# Patient Record
Sex: MALE | Race: WHITE | Employment: FULL TIME
[De-identification: names, ages, dates, MRNs, and addresses within clinical notes are randomized per-mention and may not be internally consistent; named-entity substitution may affect disease eponyms.]

---

## 2021-10-20 ENCOUNTER — NURSE TRIAGE (OUTPATIENT)
Dept: OTHER | Facility: CLINIC | Age: 46
End: 2021-10-20

## 2021-10-20 ENCOUNTER — TELEPHONE (OUTPATIENT)
Dept: ADMINISTRATIVE | Age: 46
End: 2021-10-20

## 2021-10-20 NOTE — TELEPHONE ENCOUNTER
Received call from Gabi Jasmine at [facility] with [employer]. Brief description of triage: elevated /97, 163/91 and 143/91. Caller experiencing SOB and chest pain with exertion, headaches, eye twitching, and other symptoms. He is currently not established with a PCP, but is being scheduled today. Triage indicates for patient to go to PCP office now. Caller sent to [facility] for initial treatment. Care advice provided, patient verbalizes understanding; denies any other questions or concerns; instructed to call back for any new or worsening symptoms. Writer provided warm transfer to Benigno Donovan at [facility] for appointment scheduling. Attention Provider: Thank you for allowing me to participate in the care of your patient. The patient was connected to triage in response to information provided to the ECC/PSC. Please do not respond through this encounter as the response is not directed to a shared pool. Reason for Disposition   BP Systolic BP >= 853 OR Diastolic >= 90 and postpartum (from 0 to 6 weeks after delivery)    Answer Assessment - Initial Assessment Questions  1. BLOOD PRESSURE: \"What is the blood pressure? \" \"Did you take at least two measurements 5 minutes apart? \"      Today his BP was 182/97 at 1030, then 163/91, and finally 149/91at 1120 today    2. ONSET: \"When did you take your blood pressure? \"      Today    3. HOW: \"How did you obtain the blood pressure? \" (e.g., visiting nurse, automatic home BP monitor)      Home auto cuff    4. HISTORY: \"Do you have a history of high blood pressure? \"      Yes, hypertension, but fired last PCP and has no meds. Now currently having symptoms    5. MEDICATIONS: [place] you taking any medications for blood pressure? \" \"Have you missed any doses recently? \"      He does not remember what he was taking    6. OTHER SYMPTOMS: \"Do you have any symptoms? \" (e.g., headache, chest pain, blurred vision, difficulty breathing, weakness)      Chest pain and SOB with exertion, eye twitching, headaches,neck pain. 7. PREGNANCY: \"Is there any chance you are pregnant? \" \"When was your last menstrual period? \"      N/a    Protocols used: HIGH BLOOD PRESSURE-ADULT-[place]

## 2021-10-20 NOTE — TELEPHONE ENCOUNTER
Called pt to advise of new PCP information per PCP office. Pt advised that Charl Apley, CNP with [facility], is accepting new pts. Pt will need to go to their office located at: [address], to  new patient forms. Forms must be completed prior to scheduling New patient appt. Advised pt that office hours are Monday through Friday 8 am to 5 pm, expect Thursday when they close at noon. Pt also advised that their lunch hour is 11:45 am to 12:45  pm and to avoid going to the office during that time. Pt verbalized understanding.

## 2021-11-08 ENCOUNTER — OFFICE VISIT (OUTPATIENT)
Dept: FAMILY MEDICINE CLINIC | Age: 46
End: 2021-11-08
Payer: COMMERCIAL

## 2021-11-08 VITALS
HEART RATE: 84 BPM | TEMPERATURE: 97.7 F | DIASTOLIC BLOOD PRESSURE: 88 MMHG | RESPIRATION RATE: 14 BRPM | BODY MASS INDEX: 36.16 KG/M2 | HEIGHT: 72 IN | OXYGEN SATURATION: 97 % | WEIGHT: 267 LBS | SYSTOLIC BLOOD PRESSURE: 130 MMHG

## 2021-11-08 DIAGNOSIS — K58.9 IRRITABLE BOWEL SYNDROME, UNSPECIFIED TYPE: ICD-10-CM

## 2021-11-08 DIAGNOSIS — E78.5 HYPERLIPIDEMIA, UNSPECIFIED HYPERLIPIDEMIA TYPE: ICD-10-CM

## 2021-11-08 DIAGNOSIS — F31.81 BIPOLAR 2 DISORDER (HCC): ICD-10-CM

## 2021-11-08 DIAGNOSIS — K21.9 GASTROESOPHAGEAL REFLUX DISEASE, UNSPECIFIED WHETHER ESOPHAGITIS PRESENT: ICD-10-CM

## 2021-11-08 DIAGNOSIS — R51.9 INTRACTABLE HEADACHE, UNSPECIFIED CHRONICITY PATTERN, UNSPECIFIED HEADACHE TYPE: ICD-10-CM

## 2021-11-08 DIAGNOSIS — Z12.5 PROSTATE CANCER SCREENING: ICD-10-CM

## 2021-11-08 DIAGNOSIS — Z12.11 COLON CANCER SCREENING: ICD-10-CM

## 2021-11-08 DIAGNOSIS — R07.9 CHEST PAIN, UNSPECIFIED TYPE: ICD-10-CM

## 2021-11-08 DIAGNOSIS — M19.90 ARTHRITIS: ICD-10-CM

## 2021-11-08 DIAGNOSIS — I10 HYPERTENSION, UNSPECIFIED TYPE: Primary | ICD-10-CM

## 2021-11-08 DIAGNOSIS — Z13.1 DIABETES MELLITUS SCREENING: ICD-10-CM

## 2021-11-08 PROCEDURE — 99204 OFFICE O/P NEW MOD 45 MIN: CPT | Performed by: NURSE PRACTITIONER

## 2021-11-08 RX ORDER — LAMOTRIGINE 200 MG/1
200 TABLET ORAL DAILY
COMMUNITY
Start: 2021-09-10 | End: 2022-10-04 | Stop reason: SDUPTHER

## 2021-11-08 RX ORDER — LISINOPRIL 10 MG/1
10 TABLET ORAL DAILY
Qty: 30 TABLET | Refills: 5 | Status: SHIPPED | OUTPATIENT
Start: 2021-11-08 | End: 2021-12-06 | Stop reason: ALTCHOICE

## 2021-11-08 SDOH — ECONOMIC STABILITY: FOOD INSECURITY: WITHIN THE PAST 12 MONTHS, THE FOOD YOU BOUGHT JUST DIDN'T LAST AND YOU DIDN'T HAVE MONEY TO GET MORE.: NEVER TRUE

## 2021-11-08 SDOH — ECONOMIC STABILITY: FOOD INSECURITY: WITHIN THE PAST 12 MONTHS, YOU WORRIED THAT YOUR FOOD WOULD RUN OUT BEFORE YOU GOT MONEY TO BUY MORE.: NEVER TRUE

## 2021-11-08 ASSESSMENT — ENCOUNTER SYMPTOMS: SHORTNESS OF BREATH: 1

## 2021-11-08 ASSESSMENT — PATIENT HEALTH QUESTIONNAIRE - PHQ9
SUM OF ALL RESPONSES TO PHQ QUESTIONS 1-9: 0
SUM OF ALL RESPONSES TO PHQ9 QUESTIONS 1 & 2: 0
2. FEELING DOWN, DEPRESSED OR HOPELESS: 0
1. LITTLE INTEREST OR PLEASURE IN DOING THINGS: 0
SUM OF ALL RESPONSES TO PHQ QUESTIONS 1-9: 0
SUM OF ALL RESPONSES TO PHQ QUESTIONS 1-9: 0

## 2021-11-08 ASSESSMENT — SOCIAL DETERMINANTS OF HEALTH (SDOH): HOW HARD IS IT FOR YOU TO PAY FOR THE VERY BASICS LIKE FOOD, HOUSING, MEDICAL CARE, AND HEATING?: NOT HARD AT ALL

## 2021-11-08 NOTE — PROGRESS NOTES
Name: Hood Gee  : 1975         Chief Complaint:     Chief Complaint   Patient presents with    [facility]     est care. would like labs.  Hypertension     usually has high bp. states \" 150-160\"     Hyperlipidemia     possible, would like labs.  Other     Dr. Gallito Fields for [facility] for bipolor. History of Present Illness:      Hood Gee is a 51 y.o.  male who presents with Establish Care (est care. would like labs. ), Hypertension (usually has high bp. states \" 150-160\" ), Hyperlipidemia (possible, would like labs. ), and Other (Dr. Gallito Fields for [facility] for bipolor. )      HPI     The patient presents to establish care. Bipolar 2:  Diagnosed 6 six years ago, per patient. He is following with Dr. Gallito Fields (psych) every 3 months. He is currently taking Lamictal 200mg QD and latuda 20mg QD. He states that he Saad Ng has some highs and lows\" but they are overall more controlled. Hyperlipidemia:  Admits a history of high cholesterol. He was previously on cholesterol medication but discontinued due to joint pain. He denies following a low cholesterol. Hypertension:  He admits being on a medication in the past, name unknown. He does not monitor blood pressure at home routinely. He admits chest pain and SOB. Chest pain and SOB began winter 2020. Chest pain occurs in left side of chest. Pain is described as \"tightening\". Chest pain and SOB are worse with activity. Admits headaches and eye twitching when his blood pressure is elevated. He checks his BP during these symptoms and they average as high as 180/110. He has a blood pressure cuff at home. Past Medical History:     No past medical history on file.    Reviewed all health maintenance requirements and ordered appropriate tests  Health Maintenance Due   Topic Date Due    Lipid screen  Never done    Diabetes screen  Never done    Colon cancer screen colonoscopy  Never done       Past Surgical History:     Past Surgical History: Procedure Laterality Date    HERNIA REPAIR          Medications:       Prior to Admission medications    Medication Sig Start Date End Date Taking? Authorizing Provider   lamoTRIgine (LAMICTAL) 200 MG tablet Take 200 mg by mouth 9/10/21  Yes Historical Provider, MD   lurasidone (LATUDA) 20 MG TABS tablet Take 20 mg by mouth Daily with supper 9/10/21  Yes Historical Provider, MD   lisinopril (PRINIVIL;ZESTRIL) 10 MG tablet Take 1 tablet by mouth daily 11/8/21  Yes DAVID Corrigan CNP        Allergies:       Patient has no known allergies. Social History:     Tobacco:    reports that he has quit smoking. He has never used smokeless tobacco.  Alcohol:      reports current alcohol use. Drug Use:  reports previous drug use. Drug: Marijuana Angely Talon). Family History:     No family history on file. Review of Systems:     Positive and Negative as described in HPI    Review of Systems   Respiratory: Positive for shortness of breath. Cardiovascular: Positive for chest pain. Neurological: Positive for headaches. Physical Exam:   Vitals:  /88   Pulse 84   Temp 97.7 °F (36.5 °C)   Resp 14   Ht 6' (1.829 m)   Wt 267 lb (121.1 kg)   SpO2 97%   BMI 36.21 kg/m²     Physical Exam  Constitutional:       General: He is not in acute distress. Appearance: Normal appearance. He is obese. He is not ill-appearing or toxic-appearing. HENT:      Head: Normocephalic. Right Ear: Tympanic membrane, ear canal and external ear normal. There is no impacted cerumen. Left Ear: Tympanic membrane, ear canal and external ear normal. There is no impacted cerumen. Nose: Nose normal. No congestion or rhinorrhea. Mouth/Throat:      Mouth: Mucous membranes are moist.      Pharynx: No oropharyngeal exudate or posterior oropharyngeal erythema. Eyes:      Conjunctiva/sclera: Conjunctivae normal.   Cardiovascular:      Rate and Rhythm: Normal rate and regular rhythm.       Heart sounds: Normal heart sounds. No murmur heard. Pulmonary:      Effort: Pulmonary effort is normal. No respiratory distress. Breath sounds: Normal breath sounds. No stridor. No wheezing, rhonchi or rales. Abdominal:      General: Bowel sounds are normal. There is no distension. Palpations: Abdomen is soft. There is no mass. Tenderness: There is no abdominal tenderness. There is no guarding. Hernia: No hernia is present. Comments: Abdomen round   Musculoskeletal:      Cervical back: Neck supple. Lymphadenopathy:      Cervical: No cervical adenopathy. Skin:     General: Skin is warm. Neurological:      Mental Status: He is alert and oriented to person, place, and time. Psychiatric:         Mood and Affect: Mood normal.         Behavior: Behavior normal.         Thought Content: Thought content normal.         Judgment: Judgment normal.       Data:     No results found for: NA, K, CL, CO2, BUN, CREATININE, GLUCOSE, PROT, LABALBU, BILITOT, ALKPHOS, AST, ALT  No results found for: WBC, RBC, HGB, HCT, MCV, MCH, MCHC, RDW, PLT, MPV  No results found for: TSH  No results found for: CHOL, HDL, PSA, LABA1C    Assessment/Plan:      Diagnosis Orders   1. Hypertension, unspecified type  ALT    AST    Basic Metabolic Panel    CBC   2. Colon cancer screening  POCT Fecal Immunochemical Test (FIT)   3. Diabetes mellitus screening  Hemoglobin A1C   4. Hyperlipidemia, unspecified hyperlipidemia type  Lipid Panel   5. Prostate cancer screening  PSA screening   6. Intractable headache, unspecified chronicity pattern, unspecified headache type  TSH With Reflex Ft4   7. Chest pain, unspecified type  EKG 12 lead    CARDIAC STRESS TEST EXERCISE ONLY   8. Gastroesophageal reflux disease, unspecified whether esophagitis present     9. Arthritis     10. Irritable bowel syndrome, unspecified type     11.  [facility] ([facility]       Bipolar 2:   -Continue following with psychiatry every 3 months  -No changes in medications at this time    Hyperlipidemia:  -Complete lipid panel today  -Will likely consider adding statin medication therapy back to regimen. He has a history of myalgias with statin therapy, will trial new agent.  -Request records from previous PCP    Hypertension:  --Stable in office today  -Initiate lisinopril 10 mg QD. Reviewed side effects.  -Continue monitoring blood pressure at home once daily  -Given symptoms of shortness of breath and chest pain, will further evaluate with EKG and stress test.  Will call with results and update plan as appropriate.  -Follow-up in office 4 weeks or sooner if symptoms worsen or persist    GI:  -Patient reports a history of abdominal pain. Colonoscopy completed 6/2002 showed \"colonic mucosa appeared entirely normal.  There were no bleeding sites found. There were no masses or polyps. There were no vascular abnormalities noted\". -We will discuss further at upcoming appointment. He will likely be due for repeat colonoscopy.   -Follow-up in office in 4 weeks or sooner if symptoms worsen or persist    Completed Refills   Requested Prescriptions     Signed Prescriptions Disp Refills    lisinopril (PRINIVIL;ZESTRIL) 10 MG tablet 30 tablet 5     Sig: Take 1 tablet by mouth daily       Orders Placed This Encounter   Procedures    ALT     Standing Status:   Future     Standing Expiration Date:   11/8/2022    AST     Standing Status:   Future     Standing Expiration Date:   11/8/2022    Basic Metabolic Panel     Standing Status:   Future     Standing Expiration Date:   11/8/2022    CBC     Standing Status:   Future     Standing Expiration Date:   11/8/2022    Hemoglobin A1C     Standing Status:   Future     Standing Expiration Date:   11/8/2022    Lipid Panel     Standing Status:   Future     Standing Expiration Date:   11/8/2022     Order Specific Question:   Is Patient Fasting?/# of Hours     Answer:   fasting    PSA screening     Standing Status:   Future Standing Expiration Date:   11/8/2022    TSH With Reflex Ft4     Standing Status:   Future     Standing Expiration Date:   11/8/2022    CARDIAC STRESS TEST EXERCISE ONLY     Standing Status:   Future     Standing Expiration Date:   1/7/2022     Order Specific Question:   Reason for Exam?     Answer:   Chest pain    POCT Fecal Immunochemical Test (FIT)     Standing Status:   Future     Standing Expiration Date:   11/8/2022    EKG 12 lead     Standing Status:   Future     Standing Expiration Date:   1/7/2022     Order Specific Question:   Reason for Exam?     Answer:   Chest pain        No results found for this visit on 11/08/21. Return in about 4 weeks (around 12/6/2021), or if symptoms worsen or fail to improve, for GI & HTN f/u (extended).     Electronically signed by DAVID Breen CNP on 11/08/21 at 11:57 AM.

## 2021-11-09 ENCOUNTER — HOSPITAL ENCOUNTER (OUTPATIENT)
Age: 46
Discharge: HOME | End: 2021-11-09
Payer: COMMERCIAL

## 2021-11-09 DIAGNOSIS — Z13.1 DIABETES MELLITUS SCREENING: ICD-10-CM

## 2021-11-09 DIAGNOSIS — R51.9 INTRACTABLE HEADACHE, UNSPECIFIED CHRONICITY PATTERN, UNSPECIFIED HEADACHE TYPE: ICD-10-CM

## 2021-11-09 DIAGNOSIS — E78.5 HYPERLIPIDEMIA, UNSPECIFIED HYPERLIPIDEMIA TYPE: ICD-10-CM

## 2021-11-09 DIAGNOSIS — Z12.5 PROSTATE CANCER SCREENING: ICD-10-CM

## 2021-11-09 DIAGNOSIS — I10 HYPERTENSION, UNSPECIFIED TYPE: ICD-10-CM

## 2021-11-09 LAB
ALT SERPL-CCNC: 38 U/L (ref 5–41)
ANION GAP SERPL CALCULATED.3IONS-SCNC: 11 MMOL/L (ref 9–17)
AST SERPL-CCNC: 22 U/L
BUN BLDV-MCNC: 10 MG/DL (ref 6–20)
BUN/CREAT BLD: 14 (ref 9–20)
CALCIUM SERPL-MCNC: 8.8 MG/DL (ref 8.6–10.4)
CHLORIDE BLD-SCNC: 104 MMOL/L (ref 98–107)
CO2: 23 MMOL/L (ref 20–31)
CREAT SERPL-MCNC: 0.72 MG/DL (ref 0.7–1.2)
ESTIMATED AVERAGE GLUCOSE: 126 MG/DL
GFR AFRICAN AMERICAN: >60 ML/MIN
GFR NON-AFRICAN AMERICAN: >60 ML/MIN
GFR SERPL CREATININE-BSD FRML MDRD: ABNORMAL ML/MIN/{1.73_M2}
GFR SERPL CREATININE-BSD FRML MDRD: ABNORMAL ML/MIN/{1.73_M2}
GLUCOSE BLD-MCNC: 125 MG/DL (ref 70–99)
HBA1C MFR BLD: 6 % (ref 4–6)
HCT VFR BLD CALC: 44.6 % (ref 40.7–50.3)
HEMOGLOBIN: 15.4 G/DL (ref 13–17)
MCH RBC QN AUTO: 30.4 PG (ref 25.2–33.5)
MCHC RBC AUTO-ENTMCNC: 34.5 G/DL (ref 28.4–34.8)
MCV RBC AUTO: 88 FL (ref 82.6–102.9)
NRBC AUTOMATED: 0 PER 100 WBC
PDW BLD-RTO: 12.2 % (ref 11.8–14.4)
PLATELET # BLD: NORMAL K/UL (ref 138–453)
PLATELET, FLUORESCENCE: 142 K/UL (ref 138–453)
PLATELET, IMMATURE FRACTION: 5.6 % (ref 1.1–10.3)
PMV BLD AUTO: NORMAL FL (ref 8.1–13.5)
POTASSIUM SERPL-SCNC: 4.5 MMOL/L (ref 3.7–5.3)
RBC # BLD: 5.07 M/UL (ref 4.21–5.77)
SODIUM BLD-SCNC: 138 MMOL/L (ref 135–144)
TSH SERPL DL<=0.05 MIU/L-ACNC: 3.22 MIU/L (ref 0.3–5)
WBC # BLD: 7.8 K/UL (ref 3.5–11.3)

## 2021-11-09 PROCEDURE — 80061 LIPID PANEL: CPT

## 2021-11-09 PROCEDURE — 85055 RETICULATED PLATELET ASSAY: CPT

## 2021-11-09 PROCEDURE — G0103 PSA SCREENING: HCPCS

## 2021-11-09 PROCEDURE — 80048 BASIC METABOLIC PNL TOTAL CA: CPT

## 2021-11-09 PROCEDURE — 84443 ASSAY THYROID STIM HORMONE: CPT

## 2021-11-09 PROCEDURE — 85027 COMPLETE CBC AUTOMATED: CPT

## 2021-11-09 PROCEDURE — 84460 ALANINE AMINO (ALT) (SGPT): CPT

## 2021-11-09 PROCEDURE — 84450 TRANSFERASE (AST) (SGOT): CPT

## 2021-11-09 PROCEDURE — 83036 HEMOGLOBIN GLYCOSYLATED A1C: CPT

## 2021-11-10 DIAGNOSIS — R73.03 PREDIABETES: ICD-10-CM

## 2021-11-10 LAB
CHOLESTEROL/HDL RATIO: 6.3
CHOLESTEROL: 259 MG/DL
HDLC SERPL-MCNC: 41 MG/DL
LDL CHOLESTEROL: 167 MG/DL (ref 0–130)
PROSTATE SPECIFIC ANTIGEN: 0.33 UG/L
TRIGL SERPL-MCNC: 257 MG/DL
VLDLC SERPL CALC-MCNC: ABNORMAL MG/DL (ref 1–30)

## 2021-11-10 RX ORDER — PRAVASTATIN SODIUM 20 MG
20 TABLET ORAL DAILY
Qty: 30 TABLET | Refills: 3 | Status: SHIPPED | OUTPATIENT
Start: 2021-11-10 | End: 2022-03-30 | Stop reason: SDUPTHER

## 2021-11-22 ENCOUNTER — HOSPITAL ENCOUNTER (OUTPATIENT)
Dept: NON INVASIVE DIAGNOSTICS | Age: 46
Discharge: HOME | End: 2021-11-22
Payer: COMMERCIAL

## 2021-11-22 DIAGNOSIS — R07.9 CHEST PAIN, UNSPECIFIED TYPE: ICD-10-CM

## 2021-11-22 PROCEDURE — 93017 CV STRESS TEST TRACING ONLY: CPT

## 2021-11-23 DIAGNOSIS — R07.9 CHEST PAIN, UNSPECIFIED TYPE: Primary | ICD-10-CM

## 2021-11-23 NOTE — PROCEDURES
[address] [facility], [address] [facility]                              CARDIAC STRESS TEST    PATIENT NAME: Fredy Branahm                 :        1975  MED REC NO:   [id number]                              ROOM:  ACCOUNT NO:   [de-identified]                           ADMIT DATE: 2021  PROVIDER:     Hayley Cosby    CARDIOVASCULAR DIAGNOSTIC DEPARTMENT    DATE OF STUDY:  2021    ORDERING PROVIDER:  Primo Driscoll CNP    PRIMARY CARE PROVIDER:  Primo Driscoll CNP    INTERPRETING PHYSICIAN:  Anayeli Disla. Castro Rocha MD    EXERCISE STRESS TEST REPORT    Stress, exercise stress. INDICATIONS:  Assessment of recent chest pain and/or chest discomfort. CLINICAL HISTORY:  The patient is a 50-year-old man with no known  coronary artery disease. Previous cardiac history includes:  None. Other previous history includes:  Chest pain, fatigue, dyspnea,  lightheadedness, hypertension, family history of coronary artery disease  in mother and father both in their late 42's. Symptoms just prior to testing include:  None. Relevant medications:  None. PROCEDURE:  The patient performed treadmill exercise using a Akash  protocol, completing 7:10 minutes and completing an estimated workload  of 7.46 metabolic equivalents (METS). The test was terminated due to fatigue and shortness of breath. The heart rate was 80 beats per minute at baseline and increased to 153  beats at peak exercise, which was 88% of the maximum predicted heart  rate. The rest blood pressure was 162/90 mm/Hg and increased to 212/63  mm/Hg, which is a hypertensive response. During the procedure, the  patient developed fatigue, shortness of breath, leg fatigue and chest  pain (1/3). STRESS ECG RESULTS:  The resting electrocardiogram demonstrated normal  sinus rhythm without definitive ST-segment abnormalities suggestive of  myocardial ischemia.     At peak exercise and during recovery, the patient developed:    Up-sloping ST segment changes in leads II, III, AVF, V3, V4, V5, which  did not meet diagnostic criteria for myocardial ischemia with no  premature atrial contractions (PACs) and no premature ventricular  contractions (PVCs). IMPRESSION:  No significant electrocardiographic evidence of myocardial ischemia  during EKG monitoring without significant associated arrhythmias. The patient's Duke Treadmill score is 2 which correlates with an  intermediate risk for significant coronary artery disease. Based on the patient's abnormal exercise stress test results, a repeat  study with the addition of cardiac imaging is recommended.           Raad Berman    D: 11/23/2021 7:48:26       T: 11/23/2021 7:49:49     [email or web address]  Job#: [id number]     Doc#: Unknown    CC:  DAVID Flores

## 2021-12-06 ENCOUNTER — OFFICE VISIT (OUTPATIENT)
Dept: FAMILY MEDICINE CLINIC | Age: 46
End: 2021-12-06
Payer: COMMERCIAL

## 2021-12-06 VITALS
DIASTOLIC BLOOD PRESSURE: 98 MMHG | OXYGEN SATURATION: 98 % | HEART RATE: 91 BPM | RESPIRATION RATE: 14 BRPM | SYSTOLIC BLOOD PRESSURE: 134 MMHG | HEIGHT: 72 IN | TEMPERATURE: 97 F | BODY MASS INDEX: 36.16 KG/M2 | WEIGHT: 267 LBS

## 2021-12-06 DIAGNOSIS — I10 HYPERTENSION, UNSPECIFIED TYPE: ICD-10-CM

## 2021-12-06 DIAGNOSIS — R19.7 DIARRHEA, UNSPECIFIED TYPE: Primary | ICD-10-CM

## 2021-12-06 DIAGNOSIS — R15.2 FECAL URGENCY: ICD-10-CM

## 2021-12-06 DIAGNOSIS — Z80.0 FAMILY HISTORY OF COLON CANCER: ICD-10-CM

## 2021-12-06 PROCEDURE — 99214 OFFICE O/P EST MOD 30 MIN: CPT | Performed by: NURSE PRACTITIONER

## 2021-12-06 RX ORDER — AMLODIPINE BESYLATE 2.5 MG/1
2.5 TABLET ORAL DAILY
Qty: 30 TABLET | Refills: 5 | Status: SHIPPED | OUTPATIENT
Start: 2021-12-06 | End: 2022-01-17 | Stop reason: ALTCHOICE

## 2021-12-06 RX ORDER — LISINOPRIL 20 MG/1
20 TABLET ORAL DAILY
Qty: 30 TABLET | Refills: 3 | Status: SHIPPED | OUTPATIENT
Start: 2021-12-06 | End: 2022-04-26 | Stop reason: SDUPTHER

## 2021-12-06 ASSESSMENT — ENCOUNTER SYMPTOMS
BLOOD IN STOOL: 1
DIARRHEA: 1
SHORTNESS OF BREATH: 0

## 2021-12-06 NOTE — PATIENT INSTRUCTIONS
Increase lisinopril from 10mg to 20mg once daily     SURVEY:    You may be receiving a survey from [facility] regarding your visit today. Please complete the survey to enable us to provide the highest quality of care to you and your family. If you cannot score us a very good on any question, please call the office to discuss how we could of made your experience a very good one. Thank you.

## 2021-12-06 NOTE — PROGRESS NOTES
Name: Yadi Fuller  : 1975         Chief Complaint:     Chief Complaint   Patient presents with    Hypertension     patient states bp is still running high. 134/100 in office today    Follow-up     States he doesnt have a regular bm. constantly worried if he is close to a restroom. History of Present Illness:      Yadi Fuller is a 51 y.o.  male who presents with Hypertension (patient states bp is still running high. 134/100 in office today) and Follow-up (States he doesnt have a regular bm. constantly worried if he is close to a restroom. )      HPI     Hypertension:  Current medication regimen includes lisinopril 10mg. He is monitoring his blood pressure at home. At-home blood pressure is averaging 150s/?. He admits it checking it when he has a headache and it was 180/110. He is not following a low-sodium diet. Admits having a poor diet. He denies chest pain, shortness of breath, vision changes (\"left eye twitching\"), and palpitations. Admits lightheadedness, dizziness, and headache if his blood pressure is elevated. GI:  Admits diarrhea and inconsistent bowel movements. Admits bowel urgency. Denies bowel incontinence. Symptoms have been ongoing \"for forever\". He admits taking a probiotic, \"yeast pill\", and changing his diet in the past which helped his symptoms. He admits food allergies of eggs, yeast, and wheat. He admits eating these foods. Colonoscopy completed 2002 showed \"colonic mucosa appeared entirely normal.  There were no bleeding sites found. There were no masses or polyps. There were no vascular abnormalities noted\". Denies blood in the stool. He is having episodes 1-5 times daily. Stools are described as \"water\". Admits grandfather with colon cancer in late [de-identified]. Denies family history of IBD. Past Medical History:     No past medical history on file.    Reviewed all health maintenance requirements and ordered appropriate tests  Health Maintenance Due   Topic Date Due    Colon cancer screen colonoscopy  Never done       Past Surgical History:     Past Surgical History:   Procedure Laterality Date    HERNIA REPAIR          Medications:       Prior to Admission medications    Medication Sig Start Date End Date Taking? Authorizing Provider   lisinopril (PRINIVIL;ZESTRIL) 20 MG tablet Take 1 tablet by mouth daily 12/6/21  Yes DAVID Mccurdy CNP   amLODIPine (NORVASC) 2.5 MG tablet Take 1 tablet by mouth daily 12/6/21  Yes DAVID Mccurdy CNP   pravastatin (PRAVACHOL) 20 MG tablet Take 1 tablet by mouth daily 11/10/21  Yes DAVID Mccurdy CNP   lamoTRIgine (LAMICTAL) 200 MG tablet Take 200 mg by mouth 9/10/21  Yes Historical Provider, MD   lurasidone (LATUDA) 20 MG TABS tablet Take 20 mg by mouth Daily with supper 9/10/21  Yes Historical Provider, MD        Allergies:       Patient has no known allergies. Social History:     Tobacco:    reports that he has quit smoking. He has never used smokeless tobacco.  Alcohol:      reports current alcohol use. Drug Use:  reports previous drug use. Drug: Marijuana Roscoe Jose). Family History:     No family history on file. Review of Systems:     Positive and Negative as described in HPI    Review of Systems   Eyes: Positive for visual disturbance. Respiratory: Negative for shortness of breath. Cardiovascular: Negative for chest pain and palpitations. Gastrointestinal: Positive for blood in stool and diarrhea. Neurological: Positive for dizziness, light-headedness and headaches. Physical Exam:   Vitals:  BP (!) 134/98   Pulse 91   Temp 97 °F (36.1 °C)   Resp 14   Ht 6' (1.829 m)   Wt 267 lb (121.1 kg)   SpO2 98%   BMI 36.21 kg/m²     Physical Exam  Constitutional:       General: He is not in acute distress. Appearance: Normal appearance. He is obese. He is not ill-appearing or toxic-appearing. HENT:      Head: Normocephalic.    Cardiovascular:      Rate and Rhythm: Normal rate and regular rhythm. Heart sounds: Normal heart sounds. No murmur heard. Pulmonary:      Effort: Pulmonary effort is normal. No respiratory distress. Breath sounds: Normal breath sounds. No stridor. No wheezing, rhonchi or rales. Abdominal:      General: Bowel sounds are normal. There is no distension. Palpations: Abdomen is soft. There is no mass. Tenderness: There is no abdominal tenderness. There is no guarding. Hernia: No hernia is present. Neurological:      Mental Status: He is alert and oriented to person, place, and time. Psychiatric:         Mood and Affect: Mood normal.         Behavior: Behavior normal.         Thought Content: Thought content normal.         Judgment: Judgment normal.         Data:     Lab Results   Component Value Date     11/09/2021    K 4.5 11/09/2021     11/09/2021    CO2 23 11/09/2021    BUN 10 11/09/2021    CREATININE 0.72 11/09/2021    GLUCOSE 125 11/09/2021    AST 22 11/09/2021    ALT 38 11/09/2021     Lab Results   Component Value Date    WBC 7.8 11/09/2021    RBC 5.07 11/09/2021    HGB 15.4 11/09/2021    HCT 44.6 11/09/2021    MCV 88.0 11/09/2021    MCH 30.4 11/09/2021    MCHC 34.5 11/09/2021    RDW 12.2 11/09/2021    PLT See Reflexed IPF Result 11/09/2021    MPV NOT REPORTED 11/09/2021     Lab Results   Component Value Date    TSH 3.22 11/09/2021     Lab Results   Component Value Date    CHOL 259 11/09/2021    HDL 41 11/09/2021    PSA 0.33 11/09/2021    LABA1C 6.0 11/09/2021       Assessment/Plan:      Diagnosis Orders   1. Diarrhea, unspecified type  [facility], [place]   2. Fecal urgency  [facility], [place]   3. Family history of colon cancer  [facility], [facility], [place]   4.  Hypertension, unspecified type         Diarrhea:   -Referral to [address] [place] [address] placed today for colonoscopy  -He admits several dietary allergies, though does not avoid these foods. I encouraged him to eliminate these from his diet. Continue probiotics daily. Hypertension:  -Unstable  -Increase lisinopril from 10 mg to 20 mg  -Add amlodipine 2.5 mg QD  -Reviewed lifestyle modifications to assist with lowering blood pressure including weight loss, healthy diet, exercising routinely, low-sodium diet, limiting caffeine and alcohol, and encouraging stress relief techniques. -Monitor blood pressure once daily  -Discussed that if his blood pressure rises above 180/110 he is to present to the emergency department. If his blood pressure is elevated with associated symptoms of chest pain, shortness of breath, palpitations, headache, dizziness, lightheadedness, or palpitations he is to present to the emergency department  -Follow-up 6 weeks for hypertension follow-up    Chest Pain:   --Complete stress test    Completed Refills   Requested Prescriptions     Signed Prescriptions Disp Refills    lisinopril (PRINIVIL;ZESTRIL) 20 MG tablet 30 tablet 3     Sig: Take 1 tablet by mouth daily    amLODIPine (NORVASC) 2.5 MG tablet 30 tablet 5     Sig: Take 1 tablet by mouth daily       Orders Placed This Encounter   Procedures   Alta Jean DO, General Surgery, [place]     Referral Priority:   Routine     Referral Type:   Eval and Treat     Referral Reason:   Specialty Services Required     Referred to Provider:   Camryn Trevizo DO     Requested Specialty:   General Surgery     Number of Visits Requested:   1        No results found for this visit on 12/06/21. Return in about 6 weeks (around 1/17/2022), or if symptoms worsen or fail to improve, for HTN f/u.     Electronically signed by DAVID Melendrez CNP on 12/06/21 at 12:49 PM.

## 2022-01-17 ENCOUNTER — OFFICE VISIT (OUTPATIENT)
Dept: FAMILY MEDICINE CLINIC | Age: 47
End: 2022-01-17
Payer: COMMERCIAL

## 2022-01-17 VITALS
BODY MASS INDEX: 36.98 KG/M2 | HEART RATE: 81 BPM | TEMPERATURE: 98 F | SYSTOLIC BLOOD PRESSURE: 138 MMHG | OXYGEN SATURATION: 97 % | DIASTOLIC BLOOD PRESSURE: 82 MMHG | HEIGHT: 72 IN | WEIGHT: 273 LBS

## 2022-01-17 DIAGNOSIS — R19.7 DIARRHEA, UNSPECIFIED TYPE: ICD-10-CM

## 2022-01-17 DIAGNOSIS — I10 HYPERTENSION, UNSPECIFIED TYPE: Primary | ICD-10-CM

## 2022-01-17 PROCEDURE — 99214 OFFICE O/P EST MOD 30 MIN: CPT | Performed by: NURSE PRACTITIONER

## 2022-01-17 RX ORDER — AMLODIPINE BESYLATE 5 MG/1
5 TABLET ORAL DAILY
Qty: 30 TABLET | Refills: 5 | Status: SHIPPED | OUTPATIENT
Start: 2022-01-17 | End: 2022-08-04

## 2022-01-17 ASSESSMENT — ENCOUNTER SYMPTOMS: SHORTNESS OF BREATH: 0

## 2022-01-17 NOTE — PATIENT INSTRUCTIONS
Continue lisinopril 20mg once daily     Increase amlodipine from 2.5mg once daily to 5mg once daily     Continue to monitor blood pressure at home. Write down the date, top number, bottom, and bottom number. Bring blood pressure cuff into next visit       SURVEY:    You may be receiving a survey from [employer] regarding your visit today. Please complete the survey to enable us to provide the highest quality of care to you and your family. If you cannot score us a very good on any question, please call the office to discuss how we could have made your experience a very good one. Thank you. Patient Education        Low Sodium Diet (2,000 Milligram): Care Instructions  Overview     Limiting sodium can be an important part of managing some health problems. The most common source of sodium is salt. People get most of the salt in their diet from canned, prepared, and packaged foods. Fast food and restaurant meals also are very high in sodium. Your doctor will probably limit your sodium to less than 2,000 milligrams (mg) a day. This limit counts all the sodium in prepared and packaged foods and any salt you add to your food. Follow-up care is a key part of your treatment and safety. Be sure to make and go to all appointments, and call your doctor if you are having problems. It's also a good idea to know your test results and keep a list of the medicines you take. How can you care for yourself at home? Read food labels  · Read labels on cans and food packages. The labels tell you how much sodium is in each serving. Make sure that you look at the serving size. If you eat more than the serving size, you have eaten more sodium. · Food labels also tell you the Percent Daily Value for sodium. Choose products with low Percent Daily Values for sodium. · Be aware that sodium can come in forms other than salt, including monosodium glutamate (MSG), sodium citrate, and sodium bicarbonate (baking soda).  MSG is often added to Asian food. When you eat out, you can sometimes ask for food without MSG or added salt. Buy low-sodium foods  · Buy foods that are labeled \"unsalted\" (no salt added), \"sodium-free\" (less than 5 mg of sodium per serving), or \"low-sodium\" (140 mg or less of sodium per serving). Foods labeled \"reduced-sodium\" and \"light sodium\" may still have too much sodium. Be sure to read the label to see how much sodium you are getting. · Buy fresh vegetables, or frozen vegetables without added sauces. Buy low-sodium versions of canned vegetables, soups, and other canned goods. Prepare low-sodium meals  · Cut back on the amount of salt you use in cooking. This will help you adjust to the taste. Do not add salt after cooking. One teaspoon of salt has about 2,300 mg of sodium. · Take the salt shaker off the table. · Flavor your food with garlic, lemon juice, onion, vinegar, herbs, and spices. Do not use soy sauce, lite soy sauce, steak sauce, onion salt, garlic salt, celery salt, or ketchup on your food. · Use low-sodium salad dressings, sauces, and ketchup. Or make your own salad dressings and sauces without adding salt. · Use less salt (or none) when recipes call for it. You can often use half the salt a recipe calls for without losing flavor. Other foods such as rice, pasta, and grains do not need added salt. · Rinse canned vegetables, and cook them in fresh water. This removes some--but not all--of the salt. · Avoid water that is naturally high in sodium or that has been treated with water softeners, which add sodium. If you buy bottled water, read the label and choose a sodium-free brand. Avoid high-sodium foods  · Avoid eating:  ? Smoked, cured, salted, and canned meat, fish, and poultry. ? Ham, trin[place], hot dogs, and luncheon meats. ? Regular, hard, and processed cheese and regular peanut butter.   ? Crackers with salted tops, and other salted snack foods such as pretzels, chips, and salted popcorn. ? Frozen prepared meals, unless labeled low-sodium. ? Canned and dried soups, broths, and bouillon, unless labeled sodium-free or low-sodium. ? Canned vegetables, unless labeled sodium-free or low-sodium. ? Western Sandra fries, pizza, tacos, and other fast foods. ? Pickles, olives, ketchup, and other condiments, especially soy sauce, unless labeled sodium-free or low-sodium. Where can you learn more? Go to https://PivotpeiDubbaeb.Beibamboo. org and sign in to your [employer] account. Enter J672 in the [facility] box to learn more about \"Low Sodium Diet (2,000 Milligram): Care Instructions. \"     If you do not have an account, please click on the \"Sign Up Now\" link. Current as of: September 8, 2021               Content Version: 13.1  © 4922-2257 [employer]. Care instructions adapted under license by [facility] [place] ([facility]). If you have questions about a medical condition or this instruction, always ask your healthcare professional. [place] [address] any warranty or liability for your use of this information. Patient Education        Learning About Low-Sodium Foods  What foods are low in sodium? The foods you eat contain nutrients, such as vitamins and minerals. Sodium is a nutrient. Your body needs the right amount to stay healthy and work as it should. You can use the list below to help you make choices about which foods to eat.   Fruits  · Fresh, frozen, canned, or dried fruit  Vegetables  · Fresh or frozen vegetables, with no added salt  · Canned vegetables, low-sodium or with no added salt  Grains  · Bagels without salted tops  · Cereal with no added salt  · Corn tortillas  · Crackers with no added salt  · Oatmeal, cooked without salt  · Popcorn with no salt  · Pasta and noodles, cooked without salt  · Rice, cooked without salt  · Unsalted pretzels  Dairy and dairy alternatives  · Butter, unsalted  · Cream cheese  · Ice cream  · Milk  · Soy milk  Meats and other protein foods  · Beans and peas, canned with no salt  · Eggs  · Fresh fish (not smoked)  · Fresh meats (not smoked or cured)  · Nuts and nut butter, prepared without salt  · Poultry, not packaged with sodium solution  · Tofu, unseasoned  · Tuna, canned without salt  Seasonings  · Garlic  · Herbs and spices  · Lemon juice  · Mustard  · Olive oil  · Salt-free seasoning mixes  · Vinegar  Work with your doctor to find out how much of this nutrient you need. Depending on your health, you may need more or less of it in your diet. Where can you learn more? Go to https://WeMontagepepiceweb.Tembo [email or web address] org and sign in to your [employer] account. Enter F803 in the [employer] box to learn more about \"Learning About Low-Sodium Foods. \"     If you do not have an account, please click on the \"Sign Up Now\" link. Current as of: September 8, 2021               Content Version: 13.1  © 2006-2021 [employer]. Care instructions adapted under license by [facility] [place] ([facility]). If you have questions about a medical condition or this instruction, always ask your healthcare professional. [place] [address] any warranty or liability for your use of this information. Patient Education        How to Read a Food Label to Limit Sodium: Care Instructions  Overview  Limiting sodium can be an important part of managing some health problems. Processed foods, fast food, and restaurant foods are the major sources of dietary sodium. The most common name for sodium is salt. Most packaged foods have a Nutrition Facts label. This will tell you how much sodium is in one serving of food. Follow-up care is a key part of your treatment and safety. Be sure to make and go to all appointments, and call your doctor if you are having problems. It's also a good idea to know your test results and keep a list of the medicines you take. How can you care for yourself at home?   Read ingredient lists on food labels  · Read the list of ingredients on food labels to help you find how much sodium is in a food. The label lists the ingredients in a food in descending order (from the most to the least). If salt or sodium is high on the list, there may be a lot of sodium in the food. · Know that sodium has different names. Sodium is also called monosodium glutamate (MSG), sodium citrate, sodium alginate, and sodium phosphate. Read Nutrition Facts labels  · On most foods, there is a Nutrition Facts label. This will tell you how much sodium is in one serving of food. Look at both the serving size and the sodium amount. The serving size is located at the top of the label, usually right under the \"Nutrition Facts\" title. The amount of sodium is given in the list under the title. It is given in milligrams (mg). ? Check the serving size carefully. A single serving is often very small, and you may eat more than one serving. If this is the case, you will eat more sodium than listed on the label. For example, if the serving size for a canned soup is 1 cup and the sodium amount is 470 mg, if you have 2 cups you will eat 940 mg of sodium. · The nutrition facts for fresh fruits and vegetables are not listed on the food. They may be listed somewhere in the store. These foods usually have no sodium or low sodium. · The Nutrition Facts label also gives you the Percent Daily Value for sodium. This is how much of the recommended amount of sodium a serving contains. The daily value for sodium is 2,300 mg. So if the Percent Daily Value says 50%, this means one serving is giving you half of this, or 1,150 mg. Buy low-sodium foods  · Look for foods that are made with less sodium. Watch for the following words on the label. ? \"Unsalted\" means there is no sodium added to the food. But there may be sodium already in the food naturally. ? \"Sodium-free\" means a serving has less than 5 mg of sodium. ?  \"Very low sodium\" means a serving has 35 mg or less of sodium. ? \"Low-sodium\" means a serving has 140 mg or less of sodium. · \"Reduced-sodium\" means that there is 25% less sodium than what the food normally has. This is still usually too much sodium. · Buy fresh vegetables, or frozen vegetables without added sauces. Buy low-sodium versions of canned vegetables, soups, and other canned goods. Where can you learn more? Go to https://OndorepeBactest.[email or web address] org and sign in to your [employer] account. Enter 26 [id number] in the [facility] box to learn more about \"How to Read a Food Label to Limit Sodium: Care Instructions. \"     If you do not have an account, please click on the \"Sign Up Now\" link. Current as of: September 8, 2021               Content Version: 13.1  © 2675-9769 [employer]. Care instructions adapted under license by [facility] ([facility]). If you have questions about a medical condition or this instruction, always ask your healthcare professional. [address] any warranty or liability for your use of this information.

## 2022-01-17 NOTE — PROGRESS NOTES
Name: Jennifer Cloud  : 1975         Chief Complaint:     Chief Complaint   Patient presents with    Follow-up    Hypertension     , lisinopril was increased from 10 to 20 mg qd and amlodipine 2.5 mg added. he has been monitoring BP at home 145-155/95. does not monitor dietary sodium. Thinks readings are heading in the right direction. History of Present Illness:      Jennifer Cloud is a 51 y.o.  male who presents with Follow-up and Hypertension (, lisinopril was increased from 10 to 20 mg qd and amlodipine 2.5 mg added. he has been monitoring BP at home 145-155/95. does not monitor dietary sodium. Thinks readings are heading in the right direction. )      HPI     Hypertension:  Current medication regimen includes lisinopril 20mg and amlodipine 2.5mg. He admits monitoring his blood pressure at home. At-home blood pressure is averaging 145-155/95. He denies a well balanced diet. He denies following a low-sodium diet. For physical activity, he notes none. He denies chest pain, shortness of breath, headache, vision changes, palpitations, lightheadedness, or dizziness. Diarrhea:  Upcoming consult with general surgery, Dr. Marcel Garrett, scheduled 2/15/22 to discuss colonoscopy. Past Medical History:     No past medical history on file. Reviewed all health maintenance requirements and ordered appropriate tests  Health Maintenance Due   Topic Date Due    Colon cancer screen colonoscopy  Never done       Past Surgical History:     Past Surgical History:   Procedure Laterality Date    HERNIA REPAIR          Medications:       Prior to Admission medications    Medication Sig Start Date End Date Taking?  Authorizing Provider   amLODIPine (NORVASC) 5 MG tablet Take 1 tablet by mouth daily 22  Yes DAVID Polanco CNP   lisinopril (PRINIVIL;ZESTRIL) 20 MG tablet Take 1 tablet by mouth daily 21  Yes DAVID Polanco CNP   pravastatin (PRAVACHOL) 20 MG tablet Take 1 tablet by mouth daily 11/10/21  Yes Allegranazariochris Douglass, APRN - CNP   lamoTRIgine (LAMICTAL) 200 MG tablet Take 200 mg by mouth daily  9/10/21  Yes Historical Provider, MD   lurasidone (LATUDA) 20 MG TABS tablet Take 20 mg by mouth Daily with supper 9/10/21  Yes Historical Provider, MD        Allergies:       Patient has no known allergies. Social History:     Tobacco:    reports that he has quit smoking. He has never used smokeless tobacco.  Alcohol:      reports current alcohol use. Drug Use:  reports previous drug use. Drug: Marijuana Rosana Alison). Family History:     No family history on file. Review of Systems:     Positive and Negative as described in HPI    Review of Systems   Eyes: Negative for visual disturbance. Respiratory: Negative for shortness of breath. Cardiovascular: Negative for chest pain and palpitations. Neurological: Negative for dizziness, light-headedness and headaches. Physical Exam:   Vitals:  /82   Pulse 81   Temp 98 °F (36.7 °C)   Ht 6' (1.829 m)   Wt 273 lb (123.8 kg)   SpO2 97%   BMI 37.03 kg/m²     Physical Exam  Constitutional:       General: He is not in acute distress. Appearance: Normal appearance. He is obese. He is not ill-appearing or toxic-appearing. HENT:      Head: Normocephalic. Cardiovascular:      Rate and Rhythm: Normal rate and regular rhythm. Heart sounds: Normal heart sounds. No murmur heard. Pulmonary:      Effort: Pulmonary effort is normal. No respiratory distress. Breath sounds: Normal breath sounds. No stridor. No wheezing, rhonchi or rales. Neurological:      Mental Status: He is alert and oriented to person, place, and time. Psychiatric:         Mood and Affect: Mood normal.         Behavior: Behavior normal.         Thought Content:  Thought content normal.         Judgment: Judgment normal.         Data:     Lab Results   Component Value Date     11/09/2021    K 4.5 11/09/2021     11/09/2021    CO2 23 11/09/2021    BUN 10 11/09/2021    CREATININE 0.72 11/09/2021    GLUCOSE 125 11/09/2021    AST 22 11/09/2021    ALT 38 11/09/2021     Lab Results   Component Value Date    WBC 7.8 11/09/2021    RBC 5.07 11/09/2021    HGB 15.4 11/09/2021    HCT 44.6 11/09/2021    MCV 88.0 11/09/2021    MCH 30.4 11/09/2021    MCHC 34.5 11/09/2021    RDW 12.2 11/09/2021    PLT See Reflexed IPF Result 11/09/2021    MPV NOT REPORTED 11/09/2021     Lab Results   Component Value Date    TSH 3.22 11/09/2021     Lab Results   Component Value Date    CHOL 259 11/09/2021    HDL 41 11/09/2021    PSA 0.33 11/09/2021    LABA1C 6.0 11/09/2021       Assessment/Plan:      Diagnosis Orders   1. Hypertension, unspecified type     2. Diarrhea, unspecified type         Hypertension:   - Continue lisinopril 20mg once daily   - Increase amlodipine from 2.5mg once daily to 5mg once daily   - Continue to monitor blood pressure at home. Write down the date and results. Bring records and at-home blood pressure cuff into next visit. - Reviewed lifestyle modifications to assist with lowering blood pressure including weight loss, healthy diet, exercising routinely, low-sodium diet, limiting caffeine and alcohol, and encouraging stress relief techniques. - Written/printed education regarding low sodium diet given to patient   - F/u in 8 weeks for HTN f/u or sooner if concerns arise     Diarrhea:  - Attend consult with general surgery 2/2022 to discuss colonoscopy     Bring blood pressure cuff into next visit   Completed Refills   Requested Prescriptions     Signed Prescriptions Disp Refills    amLODIPine (NORVASC) 5 MG tablet 30 tablet 5     Sig: Take 1 tablet by mouth daily       No orders of the defined types were placed in this encounter. No results found for this visit on 01/17/22. Return in about 2 months (around 3/17/2022), or if symptoms worsen or fail to improve, for HTN f/u.     Electronically signed by DAVID Black CNP on 01/17/22 at 8:46 AM.

## 2022-03-22 ENCOUNTER — OFFICE VISIT (OUTPATIENT)
Dept: FAMILY MEDICINE CLINIC | Age: 47
End: 2022-03-22
Payer: COMMERCIAL

## 2022-03-22 VITALS
SYSTOLIC BLOOD PRESSURE: 118 MMHG | RESPIRATION RATE: 14 BRPM | DIASTOLIC BLOOD PRESSURE: 82 MMHG | HEIGHT: 72 IN | OXYGEN SATURATION: 97 % | WEIGHT: 272 LBS | HEART RATE: 87 BPM | BODY MASS INDEX: 36.84 KG/M2

## 2022-03-22 DIAGNOSIS — R73.9 HYPERGLYCEMIA: ICD-10-CM

## 2022-03-22 DIAGNOSIS — R73.03 PREDIABETES: Primary | ICD-10-CM

## 2022-03-22 DIAGNOSIS — E78.5 HYPERLIPIDEMIA, UNSPECIFIED HYPERLIPIDEMIA TYPE: ICD-10-CM

## 2022-03-22 DIAGNOSIS — I10 HYPERTENSION, UNSPECIFIED TYPE: ICD-10-CM

## 2022-03-22 DIAGNOSIS — R25.3 MUSCLE TWITCHING: ICD-10-CM

## 2022-03-22 DIAGNOSIS — R73.09 ELEVATED GLUCOSE: ICD-10-CM

## 2022-03-22 PROCEDURE — 99214 OFFICE O/P EST MOD 30 MIN: CPT | Performed by: NURSE PRACTITIONER

## 2022-03-22 ASSESSMENT — ENCOUNTER SYMPTOMS
DIARRHEA: 1
SHORTNESS OF BREATH: 0

## 2022-03-22 NOTE — PATIENT INSTRUCTIONS
SURVEY:    You may be receiving a survey from [employer] regarding your visit today. Please complete the survey to enable us to provide the highest quality of care to you and your family. If you cannot score us a very good on any question, please call the office to discuss how we could of made your experience a very good one. Thank you. Patient Education          Learning About the Low FODMAP Diet for Irritable Bowel Syndrome (IBS)  What is the low-FODMAP diet? A low-FODMAP diet is used to find out if certain foods make irritable bowel syndrome (IBS) worse. You stop eating high-FODMAP foods for 2 to 6 weeks. Then you slowly add them back to see how your body reacts. This is called an elimination diet. A dietitian or doctor can help you follow this diet. FODMAPs are types of carbohydrates that can be hard for your body to digest. They are in many types of foods. FODMAP stands for:  · F ermentable. · O ligosaccharides. · D isaccharides. · M onosaccharides. · A nd p olyols. If you have IBS, foods that are high in FODMAPs may make your symptoms worse. When you are on this diet, you can still eat carbohydrates that are low in FODMAPs. This includes certain fruits, vegetables, grains, and low-lactose dairy products. What is it used for? This diet is used to help manage symptoms of irritable bowel syndrome (IBS). The diet limits foods that are high in FODMAPs. High-FODMAP foods can be hard to digest. They pull more fluid into your intestines. They are also easily fermented. This can lead to bloating, belly pain, gas, and diarrhea. The low-FODMAP diet can help you figure out what foods to avoid. And it can help you find foods that are easier to digest.  This diet can help with IBS symptoms. But it's not a cure. You will still need to manage your condition. How does it work? At first, you won't eat any high-FODMAP foods for a few weeks.   It can be helpful to work with a dietitian who is trained in the low-FODMAP diet when you try this diet. They can help you find recipes and FODMAP food lists to use while you are on the diet. After 2 to 6 weeks, you will start to try high-FODMAP foods again. You will add those foods back to your diet, one at a time. Your doctor or dietitian will probably have you wait a few days before you add each new food. Keep a food diary. You can write down the foods you try and note how they make you feel. After a few weeks, you may have a better idea of what foods you should avoid and what foods you can eat without triggering IBS symptoms. What are the risks? There is some risk of not getting all of the vitamins and nutrients you need on the low-FODMAP diet. These include:  · Folate. · Thiamin. · Vitamin B6.  · Calcium. · Vitamin D. Your dietitian or doctor can help you find other sources of these if needed. This diet may limit your fiber intake. If you need more fiber, ask your doctor or dietitian about low-FODMAP fiber sources. What foods are on the low-FODMAP diet? Here is a guide to foods that you can eat, plus the foods that you should avoid, when you are on the low-FODMAP diet. Grains  Okay to eat: Foods made from grains like arrowroot, buckwheat, cornmeal, millet, and oats. You can also eat potato flour, quinoa, rice, sorghum, tapioca, and teff. Cereals, pasta, breads, corn tortillas and baked goods made from these grains are also okay. (These grains may be labeled \"gluten-free. \")  Avoid: Grains like wheat, barley, and rye. Avoid ingredients such as bulgur, couscous, durum, and semolina. And avoid cereals, breads, and pastas made from these grains. Avoid chickpea, lentil, and pea flour. Proteins  Okay to eat: Most meat, fish, and eggs without high-FODMAP sauces. You can have small amounts of almonds or hazelnuts (10 nuts). Macadamia nuts, peanuts, pecans, pine nuts, and walnuts are also okay. You can also eat [place] and pumpkin seeds, tofu, and tempeh.   Avoid: Beans, chickpeas, lentils, and soybeans. Avoid pistachio and cashew nuts. Avoid fatty or fried meats. And some sausages may have high-FODMAP ingredients. Dairy  Okay to eat: Lactose-free dairy milks. Rice milk and almond milk are okay. So are lactose-free yogurts, kefirs, ice creams, and sorbet from low-FODMAP fruits and sweeteners. (These are often labeled \"lactose-free. \") You can have small amounts (2 Tbsp) of cottage, cream, or ricotta cheese. Hard cheeses like cheddar, [place], ROSS, and Swiss are okay. So are small amounts (1 oz) of aged or ripened cheeses like Brie, blue, and feta. Avoid: Milk, including cow, goat, and sheep. Avoid condensed or evaporated milk, buttermilk, custard, cream, sour cream, yogurt, and ice cream. Avoid soy milk. (Check sauces for dairy ingredients.)  Vegetables  Okay to eat: Bamboo shoots, bell peppers, bok indu, broccoli, cabbage (red or white), and cucumbers. Eggplant, green beans, lettuce, olives, parsnips, and potatoes are okay to eat. So are pumpkin, rutabaga, seaweed, sprouts, Swiss chard, and spinach. You can eat scallions (green part only) and yellow or spaghetti squash. You can eat tomatoes, turnips, watercress, yams, and zucchini. You can also have small amounts of artichoke hearts (from can, 1 oz), carrots, corn (½ cob), and sweet potato (½ cup). Avoid: Artichokes, asparagus, [place] sprouts, sam cabbage, cauliflower, and celery. And avoid garlic, leeks, mushrooms, okra, onions, scallions (white part), shallots, and peas. Fruits  Okay to eat: Bananas, blueberries, cantaloupe, grapes, and honeydew. Kiwi, kal, limes, oranges, passion fruit, papaya, and pineapple are also okay. You can eat plantain, raspberries, rhubarb, star fruit, strawberries, tangelo, and tangerine. You can also have small amounts of dried banana chips (up to 10 chips), dried cranberries (1 Tbsp), and shredded coconut (up to ¼ cup).   Avoid: Apples, applesauce, apricots, avocados, blackberries, boysenberries, and cherries. Also avoid dates, figs, grapefruit, guava, lychee, and mangoes. Don't eat nectarines, peaches, pears, persimmon, plums, prunes, tamarillo, or watermelon. And limit most canned and dried fruits. Oils, spices, condiments, and sweeteners  Okay to eat: Vegetable oils (including garlic infused), butter, ghee, lard, and margarine. You can have most fresh herbs like basil, chives, coriander, niurka, parsley, rosemary, and thyme. You can have salt, jams made from low-FODMAP fruits, mayonnaise, and mustard. Soy sauce, hot sauce (no garlic), tamari, and vinegar are also okay. Sweeteners that are okay include sugar (sucrose), powdered (confectioner's) sugar, brown sugar, glucose, and maple syrup. You can also have some artificial sweeteners like aspartame, saccharine, and stevia. Avoid: Chutneys, hummus, jellies, garlic sauces, and gravies made with onion or garlic. Avoid pickles, relish, some salad dressings and soup stocks, salsa, and tomato paste. And avoid sauces and other foods with high fructose corn syrup, honey, molasses, and agave. Avoid artificial sweeteners (isomalt, mannitol, malitol, sorbitol, and xylitol). Avoid corn syrup solids, fructose, fruit juice concentrate, and polydextrose. Other foods and drinks  Okay to have: Water, soda water, tonic, soft drinks sweetened with sugar, ½ cup of low-FODMAP fruit juice, and most teas and alcohols. You can also eat foods made with baking powder and soda, cocoa, and gelatin. Avoid: Juices from high-FODMAP fruits and vegetables. And avoid fortified dorothy, chamomile and fennel teas, chicory-based drinks and coffee substitutes, and bouillon cubes. Follow-up care is a key part of your treatment and safety. Be sure to make and go to all appointments, and call your doctor if you are having problems. It's also a good idea to know your test results and keep a list of the medicines you take. Where can you learn more?   Go to [email or web address] and sign in to your [employer] account. Enter L235 in the [place] box to learn more about \"Learning About the Low FODMAP Diet for Irritable Bowel Syndrome (IBS). \"     If you do not have an account, please click on the \"Sign Up Now\" link. Current as of: September 8, 2021               Content Version: 13.1  © 2006-2021 [email or web address]. Care instructions adapted under license by [facility] ([facility]). If you have questions about a medical condition or this instruction, always ask your healthcare professional. [place] [address] any warranty or liability for your use of this information. Patient Education             Diarrhea: Care Instructions  Overview     Diarrhea is loose, watery stools (bowel movements). The exact cause is often hard to find. Sometimes diarrhea is your body's way of getting rid of what caused an upset stomach. Viruses, food poisoning, and many medicines can cause diarrhea. Some people get diarrhea in response to emotional stress, anxiety, or certain foods. Almost everyone has diarrhea now and then. It usually isn't serious, and your stools will return to normal soon. The important thing to do is replace the fluids you have lost, so you can prevent dehydration. The doctor has checked you carefully, but problems can develop later. If you notice any problems or new symptoms, get medical treatment right away. Follow-up care is a key part of your treatment and safety. Be sure to make and go to all appointments, and call your doctor if you are having problems. It's also a good idea to know your test results and keep a list of the medicines you take. How can you care for yourself at home? · Watch for signs of dehydration, which means your body has lost too much water. Dehydration is a serious condition and should be treated right away. Signs of dehydration are:  ? Increasing thirst and dry eyes and mouth. ?  Feeling faint or lightheaded. ? A smaller amount of urine than normal.  · To prevent dehydration, drink plenty of fluids. Choose water and other clear liquids until you feel better. If you have kidney, heart, or liver disease and have to limit fluids, talk with your doctor before you increase the amount of fluids you drink. · When you feel like eating, start with small amounts of food. · The doctor may recommend that you take over-the-counter medicine, such as loperamide (Imodium). Read and follow all instructions on the label. Do not use this medicine if you have bloody diarrhea, a high fever, or other signs of serious illness. Call your doctor if you think you are having a problem with your medicine. When should you call for help? Call 911 anytime you think you may need emergency care. For example, call if:    · You passed out (lost consciousness).     · Your stools are maroon or very bloody. Call your doctor now or seek immediate medical care if:    · You are dizzy or lightheaded, or you feel like you may faint.     · Your stools are black and look like tar, or they have streaks of blood.     · You have new or worse belly pain.     · You have symptoms of dehydration, such as:  ? Dry eyes and a dry mouth. ? Passing only a little urine. ? Cannot keep fluids down.     · You have a new or higher fever. Watch closely for changes in your health, and be sure to contact your doctor if:    · Your diarrhea is getting worse.     · You see pus in the diarrhea.     · You are not getting better after 2 days (48 hours). Where can you learn more? Go to https://[email or web address] org and sign in to your [employer] account. Enter R323 in the [facility] box to learn more about \"Diarrhea: Care Instructions. \"     If you do not have an account, please click on the \"Sign Up Now\" link. Current as of: July 1, 2021               Content Version: 13.1  © 8157-1459 Healthwise[employer] Incorporated.    Care instructions adapted under license by [facility] ([facility]). If you have questions about a medical condition or this instruction, always ask your healthcare professional. [address] any warranty or liability for your use of this information.

## 2022-03-22 NOTE — PROGRESS NOTES
Name: Maicol Sweet  : 1975         Chief Complaint:     Chief Complaint   Patient presents with    Follow-up     2 month check. denies any concerns       History of Present Illness:      Maicol Sweet is a 49 y.o.  male who presents with Follow-up (2 month check. denies any concerns)      HPI     Hypertension:  Current medication regimen includes lisinopril 20mg and amlodipine 5mg. He admits monitoring his blood pressure at home. At-home blood pressure is averaging 130/90. He denies a well balanced diet. He admits following a low-sodium diet. For physical activity, he notes none. He denies chest pain, shortness of breath, headache, vision changes, palpitations, lightheadedness, or dizziness. Diarrhea:  He met with Dr. González Armando 22 (gen surgery) and is planning to undergo screening colonoscopy 22. Hyperlipidemia:  Current treatment includes pravastatin 20mg. He denies side effects with this medication. LDL 2021 167. Past Medical History:     No past medical history on file. Reviewed all health maintenance requirements and ordered appropriate tests  Health Maintenance Due   Topic Date Due    Colorectal Cancer Screen  Never done       Past Surgical History:     Past Surgical History:   Procedure Laterality Date    HERNIA REPAIR          Medications:       Prior to Admission medications    Medication Sig Start Date End Date Taking?  Authorizing Provider   amLODIPine (NORVASC) 5 MG tablet Take 1 tablet by mouth daily 22  Yes DAVID Stone CNP   lisinopril (PRINIVIL;ZESTRIL) 20 MG tablet Take 1 tablet by mouth daily 21  Yes DAVID Stone CNP   pravastatin (PRAVACHOL) 20 MG tablet Take 1 tablet by mouth daily 11/10/21  Yes DAVID Stone CNP   lamoTRIgine (LAMICTAL) 200 MG tablet Take 200 mg by mouth daily  9/10/21  Yes Historical Provider, MD   lurasidone (LATUDA) 20 MG TABS tablet Take 20 mg by mouth Daily with supper 9/10/21  Yes Historical Provider, MD   dicyclomine (BENTYL) 10 MG capsule Take 1 capsule by mouth 4 times daily  Patient not taking: Reported on 3/22/2022 2/18/22   Anna Gentile MD        Allergies:       Patient has no known allergies. Social History:     Tobacco:    reports that he has quit smoking. He has never used smokeless tobacco.  Alcohol:      reports current alcohol use. Drug Use:  reports previous drug use. Drug: Marijuana Lyndel Van). Family History:     No family history on file. Review of Systems:     Positive and Negative as described in HPI    Review of Systems   Eyes: Negative for visual disturbance. Respiratory: Negative for shortness of breath. Cardiovascular: Negative for chest pain and palpitations. Gastrointestinal: Positive for diarrhea (Admits diarrhea is \"random\"). Neurological: Negative for dizziness, light-headedness and headaches. Physical Exam:   Vitals:  /82   Pulse 87   Resp 14   Ht 6' (1.829 m)   Wt 272 lb (123.4 kg)   SpO2 97%   BMI 36.89 kg/m²     Physical Exam  Constitutional:       General: He is not in acute distress. Appearance: Normal appearance. He is obese. He is not ill-appearing or toxic-appearing. HENT:      Head: Normocephalic. Cardiovascular:      Rate and Rhythm: Normal rate and regular rhythm. Heart sounds: Normal heart sounds. No murmur heard. Pulmonary:      Effort: Pulmonary effort is normal. No respiratory distress. Breath sounds: Normal breath sounds. No stridor. No wheezing, rhonchi or rales. Abdominal:      General: Abdomen is flat. Bowel sounds are normal. There is no distension. Palpations: Abdomen is soft. There is no mass. Tenderness: There is no abdominal tenderness. There is no guarding. Hernia: No hernia is present. Skin:     General: Skin is warm. Neurological:      Mental Status: He is alert and oriented to person, place, and time.    Psychiatric:         Mood and Affect: Mood normal. Behavior: Behavior normal.         Thought Content: Thought content normal.         Judgment: Judgment normal.         Data:     Lab Results   Component Value Date     11/09/2021    K 4.5 11/09/2021     11/09/2021    CO2 23 11/09/2021    BUN 10 11/09/2021    CREATININE 0.72 11/09/2021    GLUCOSE 125 11/09/2021    AST 22 11/09/2021    ALT 38 11/09/2021     Lab Results   Component Value Date    WBC 7.8 11/09/2021    RBC 5.07 11/09/2021    HGB 15.4 11/09/2021    HCT 44.6 11/09/2021    MCV 88.0 11/09/2021    MCH 30.4 11/09/2021    MCHC 34.5 11/09/2021    RDW 12.2 11/09/2021    PLT See Reflexed IPF Result 11/09/2021    MPV NOT REPORTED 11/09/2021     Lab Results   Component Value Date    TSH 3.22 11/09/2021     Lab Results   Component Value Date    CHOL 259 11/09/2021    HDL 41 11/09/2021    PSA 0.33 11/09/2021    LABA1C 6.0 11/09/2021       Assessment/Plan:      Diagnosis Orders   1. Prediabetes  Hemoglobin A1C   2. Hyperlipidemia, unspecified hyperlipidemia type  Lipid Panel   3. Elevated glucose  Hemoglobin A1C   4. Muscle twitching  Basic Metabolic Panel   5. Hyperglycemia     6. Hypertension, unspecified type       Prediabetes:  -Most recent A1c 11/2021 6.0%  -Counseled on diabetic diet and routine physical activity  -Repeat A1c today    Hypertension:  -Stable on lisinopril 20 mg daily and amlodipine 5 mg daily  -Reviewed lifestyle modifications to assist with lowering blood pressure including weight loss, healthy diet, exercising routinely, low-sodium diet, limiting caffeine and alcohol, and encouraging stress relief techniques.     Hyperlipidemia:  -Lipid panel 11/2021: total cholesterol 259, , triglycerides 257  -Patient is tolerating pravastatin 20 mg daily well  -Repeat lipid panel today    Diarrhea:  --Chronic  -Continue dicyclomine 10 mg 4 times daily as needed as prescribed by general surgery  -He is scheduled for [facility]  [facility]    Bipolar 2:  -Following with psychiatry. Completed Refills   Requested Prescriptions      No prescriptions requested or ordered in this encounter       Orders Placed This Encounter   Procedures    Hemoglobin A1C     Standing Status:   Future     Standing Expiration Date:   3/22/2023    Lipid Panel     Standing Status:   Future     Standing Expiration Date:   3/22/2023     Order Specific Question:   Is Patient Fasting?/# of Hours     Answer:   fasting    Basic Metabolic Panel     Standing Status:   Future     Standing Expiration Date:   3/22/2023        No results found for this visit on 03/22/22. Return in about 4 months (around 7/22/2022), or if symptoms worsen or fail to improve, for f/u .     Electronically signed by DAVID Carranza CNP on 03/22/22 at 8:25 AM.

## 2022-03-30 RX ORDER — PRAVASTATIN SODIUM 20 MG
20 TABLET ORAL DAILY
Qty: 90 TABLET | Refills: 3 | Status: SHIPPED | OUTPATIENT
Start: 2022-03-30 | End: 2022-06-06

## 2022-04-26 RX ORDER — LISINOPRIL 20 MG/1
20 TABLET ORAL DAILY
Qty: 90 TABLET | Refills: 3 | Status: SHIPPED | OUTPATIENT
Start: 2022-04-26 | End: 2022-10-18 | Stop reason: ALTCHOICE

## 2022-05-31 ENCOUNTER — HOSPITAL ENCOUNTER (OUTPATIENT)
Age: 47
Discharge: HOME | End: 2022-05-31
Payer: COMMERCIAL

## 2022-05-31 DIAGNOSIS — R73.09 ELEVATED GLUCOSE: ICD-10-CM

## 2022-05-31 DIAGNOSIS — R25.3 MUSCLE TWITCHING: ICD-10-CM

## 2022-05-31 DIAGNOSIS — R73.03 PREDIABETES: ICD-10-CM

## 2022-05-31 DIAGNOSIS — E78.5 HYPERLIPIDEMIA, UNSPECIFIED HYPERLIPIDEMIA TYPE: ICD-10-CM

## 2022-05-31 LAB
ANION GAP SERPL CALCULATED.3IONS-SCNC: 14 MMOL/L (ref 9–17)
BUN BLDV-MCNC: 14 MG/DL (ref 6–20)
BUN/CREAT BLD: 22 (ref 9–20)
CALCIUM SERPL-MCNC: 9.1 MG/DL (ref 8.6–10.4)
CHLORIDE BLD-SCNC: 100 MMOL/L (ref 98–107)
CHOLESTEROL/HDL RATIO: 8.4
CHOLESTEROL: 327 MG/DL
CO2: 21 MMOL/L (ref 20–31)
CREAT SERPL-MCNC: 0.63 MG/DL (ref 0.7–1.2)
ESTIMATED AVERAGE GLUCOSE: 134 MG/DL
GFR AFRICAN AMERICAN: >60 ML/MIN
GFR NON-AFRICAN AMERICAN: >60 ML/MIN
GFR SERPL CREATININE-BSD FRML MDRD: ABNORMAL ML/MIN/{1.73_M2}
GFR SERPL CREATININE-BSD FRML MDRD: ABNORMAL ML/MIN/{1.73_M2}
GLUCOSE BLD-MCNC: 112 MG/DL (ref 70–99)
HBA1C MFR BLD: 6.3 % (ref 4–6)
HDLC SERPL-MCNC: 39 MG/DL
LDL CHOLESTEROL DIRECT: 194 MG/DL
LDL CHOLESTEROL: ABNORMAL MG/DL (ref 0–130)
POTASSIUM SERPL-SCNC: 4.6 MMOL/L (ref 3.7–5.3)
SODIUM BLD-SCNC: 135 MMOL/L (ref 135–144)
TRIGL SERPL-MCNC: 463 MG/DL

## 2022-05-31 PROCEDURE — 80061 LIPID PANEL: CPT

## 2022-05-31 PROCEDURE — 36415 COLL VENOUS BLD VENIPUNCTURE: CPT

## 2022-05-31 PROCEDURE — 83721 ASSAY OF BLOOD LIPOPROTEIN: CPT

## 2022-05-31 PROCEDURE — 80048 BASIC METABOLIC PNL TOTAL CA: CPT

## 2022-05-31 PROCEDURE — 83036 HEMOGLOBIN GLYCOSYLATED A1C: CPT

## 2022-06-03 ENCOUNTER — TELEPHONE (OUTPATIENT)
Dept: SURGERY | Age: 47
End: 2022-06-03

## 2022-06-03 DIAGNOSIS — K58.9 IRRITABLE BOWEL SYNDROME, UNSPECIFIED TYPE: Primary | ICD-10-CM

## 2022-06-03 NOTE — TELEPHONE ENCOUNTER
Spoke to patient to let him know his 7/19 colonoscopy is cancelled. Patient agreeable to see GI. Referral sent.

## 2022-06-06 ENCOUNTER — OFFICE VISIT (OUTPATIENT)
Dept: PRIMARY CARE CLINIC | Age: 47
End: 2022-06-06
Payer: COMMERCIAL

## 2022-06-06 VITALS
DIASTOLIC BLOOD PRESSURE: 80 MMHG | HEIGHT: 72 IN | WEIGHT: 265 LBS | BODY MASS INDEX: 35.89 KG/M2 | RESPIRATION RATE: 16 BRPM | SYSTOLIC BLOOD PRESSURE: 128 MMHG

## 2022-06-06 DIAGNOSIS — R94.39 ABNORMAL STRESS TEST: ICD-10-CM

## 2022-06-06 DIAGNOSIS — Z87.898 HX OF CHEST PAIN: Primary | ICD-10-CM

## 2022-06-06 DIAGNOSIS — R73.03 PREDIABETES: ICD-10-CM

## 2022-06-06 DIAGNOSIS — E78.5 HYPERLIPIDEMIA, UNSPECIFIED HYPERLIPIDEMIA TYPE: ICD-10-CM

## 2022-06-06 DIAGNOSIS — Z82.49 FAMILY HISTORY OF EARLY CAD: ICD-10-CM

## 2022-06-06 PROCEDURE — 99214 OFFICE O/P EST MOD 30 MIN: CPT | Performed by: NURSE PRACTITIONER

## 2022-06-06 RX ORDER — ATORVASTATIN CALCIUM 80 MG/1
80 TABLET, FILM COATED ORAL DAILY
Qty: 30 TABLET | Refills: 5 | Status: SHIPPED | OUTPATIENT
Start: 2022-06-06

## 2022-06-06 ASSESSMENT — ENCOUNTER SYMPTOMS: SHORTNESS OF BREATH: 0

## 2022-06-06 ASSESSMENT — PATIENT HEALTH QUESTIONNAIRE - PHQ9
3. TROUBLE FALLING OR STAYING ASLEEP: 0
9. THOUGHTS THAT YOU WOULD BE BETTER OFF DEAD, OR OF HURTING YOURSELF: 0
5. POOR APPETITE OR OVEREATING: 0
SUM OF ALL RESPONSES TO PHQ QUESTIONS 1-9: 0
SUM OF ALL RESPONSES TO PHQ QUESTIONS 1-9: 0
1. LITTLE INTEREST OR PLEASURE IN DOING THINGS: 0
8. MOVING OR SPEAKING SO SLOWLY THAT OTHER PEOPLE COULD HAVE NOTICED. OR THE OPPOSITE, BEING SO FIGETY OR RESTLESS THAT YOU HAVE BEEN MOVING AROUND A LOT MORE THAN USUAL: 0
SUM OF ALL RESPONSES TO PHQ9 QUESTIONS 1 & 2: 0
SUM OF ALL RESPONSES TO PHQ QUESTIONS 1-9: 0
SUM OF ALL RESPONSES TO PHQ QUESTIONS 1-9: 0
4. FEELING TIRED OR HAVING LITTLE ENERGY: 0
2. FEELING DOWN, DEPRESSED OR HOPELESS: 0
7. TROUBLE CONCENTRATING ON THINGS, SUCH AS READING THE NEWSPAPER OR WATCHING TELEVISION: 0
10. IF YOU CHECKED OFF ANY PROBLEMS, HOW DIFFICULT HAVE THESE PROBLEMS MADE IT FOR YOU TO DO YOUR WORK, TAKE CARE OF THINGS AT HOME, OR GET ALONG WITH OTHER PEOPLE: 0
6. FEELING BAD ABOUT YOURSELF - OR THAT YOU ARE A FAILURE OR HAVE LET YOURSELF OR YOUR FAMILY DOWN: 0

## 2022-06-06 NOTE — PROGRESS NOTES
Name: Saroj Carrera  : 1975         Chief Complaint:     Chief Complaint   Patient presents with   [address] [employer] [address]     denies any concerns. History of Present Illness:      Saroj Carrera is a 49 y.o.  male who presents with Discuss Labs (denies any concerns. )      HPI     The patient presents to discuss labs. Lipid panel 2022 as follows: total cholesterol 327, HDL 39, , triglycerides 463. He denies low cholesterol, low fat diet. For exercise he notes none. He is currently taking pravastatin 20mg QD. He admits daily medication compliance. Family history of ASHD includes mother and father. His father suffered MI in his 45s. He admits chest pain prior to taking his blood pressure medication. He denies chest pain or SOB since starting lisinopril and amlodipine. Cardiac treadmill stress test completed 2021 correlated with intermediate risk for significant coronary artery disease. He was ordered a repeat study with the addition of cardiac imaging as recommended by cardiology, though patient reports he did not complete this. Past Medical History:     No past medical history on file. Reviewed all health maintenance requirements and ordered appropriate tests  Health Maintenance Due   Topic Date Due    Colorectal Cancer Screen  Never done       Past Surgical History:     Past Surgical History:   Procedure Laterality Date    HERNIA REPAIR          Medications:       Prior to Admission medications    Medication Sig Start Date End Date Taking?  Authorizing Provider   atorvastatin (LIPITOR) 80 MG tablet Take 1 tablet by mouth daily 22  Yes Ileana Dakins, APRN - CNP   lisinopril (PRINIVIL;ZESTRIL) 20 MG tablet Take 1 tablet by mouth daily 22  Yes Ileana Dakins, APRN - CNP   amLODIPine (NORVASC) 5 MG tablet Take 1 tablet by mouth daily 22  Yes Ileana Dakins, APRN - CNP   lamoTRIgine (LAMICTAL) 200 MG tablet Take 200 mg by mouth daily  9/10/21  Yes Historical Provider, MD   lurasidone (LATUDA) 20 MG TABS tablet Take 20 mg by mouth Daily with supper 9/10/21  Yes Historical Provider, MD   dicyclomine (BENTYL) 10 MG capsule Take 1 capsule by mouth 4 times daily  Patient not taking: Reported on 3/22/2022 2/18/22   Yuniel Sol MD        Allergies:       Patient has no known allergies. Social History:     Tobacco:    reports that he has quit smoking. He has never used smokeless tobacco.  Alcohol:      reports current alcohol use. Drug Use:  reports previous drug use. Drug: Marijuana Nicki Beat). Family History:     No family history on file. Review of Systems:     Positive and Negative as described in HPI    Review of Systems   Respiratory: Negative for shortness of breath. Cardiovascular: Negative for chest pain. Physical Exam:   Vitals:  /80   Resp 16   Ht 6' (1.829 m)   Wt 265 lb (120.2 kg)   BMI 35.94 kg/m²     Physical Exam  Constitutional:       General: He is not in acute distress. Appearance: Normal appearance. He is obese. He is not ill-appearing or toxic-appearing. Cardiovascular:      Rate and Rhythm: Normal rate and regular rhythm. Heart sounds: Normal heart sounds. No murmur heard. Pulmonary:      Effort: Pulmonary effort is normal. No respiratory distress. Breath sounds: Normal breath sounds. No stridor. No wheezing, rhonchi or rales. Neurological:      Mental Status: He is alert and oriented to person, place, and time. Psychiatric:         Mood and Affect: Mood normal.         Behavior: Behavior normal.         Thought Content:  Thought content normal.         Judgment: Judgment normal.         Data:     Lab Results   Component Value Date     05/31/2022    K 4.6 05/31/2022     05/31/2022    CO2 21 05/31/2022    BUN 14 05/31/2022    CREATININE 0.63 05/31/2022    GLUCOSE 112 05/31/2022    AST 22 11/09/2021    ALT 38 11/09/2021     Lab Results   Component Value Date    WBC 7.8 11/09/2021    RBC 5.07 11/09/2021    HGB 15.4 11/09/2021    HCT 44.6 11/09/2021    MCV 88.0 11/09/2021    MCH 30.4 11/09/2021    MCHC 34.5 11/09/2021    RDW 12.2 11/09/2021    PLT See Reflexed IPF Result 11/09/2021    MPV NOT REPORTED 11/09/2021     Lab Results   Component Value Date    TSH 3.22 11/09/2021     Lab Results   Component Value Date    CHOL 327 05/31/2022    HDL 39 05/31/2022    PSA 0.33 11/09/2021    LABA1C 6.3 05/31/2022       Assessment/Plan:      Diagnosis Orders   1. Hx of chest pain  Criselda Miranda MD, Cardiology, [place]   2. Hyperlipidemia, unspecified hyperlipidemia type  Criselda Miranda MD, Cardiology, [address] [place] [address] Hesham Sykes   3. Family history of early CAD  Criselda Miranda MD, Cardiology, [place]   4. Abnormal stress test  Dwain Stafford, Donavan Roland MD, Cardiology, [place]   5. Prediabetes  [facility] [place]     - Reviewed uncontrolled lipid panel from 5/31/2022 as noted in HPI.  - Discontinue pravastatin 20 mg daily  - Initiate atorvastatin 80 mg daily and aspirin 81 mg daily. He may also need secondary agent such as Zetia 10 mg daily.  - He has multiple risk factors for coronary artery disease including hypertension, obesity, hyperlipidemia, and prediabetes. He also notes an extensive CAD family history including both parents with MIs and stent placements. He states his father suffered his first heart attack in his 45s. - Referral to dietitian placed today as he notes a very poor diet. Patient is agreeable to this referral.  - He did undergo treadmill stress test 11/2021 due to complaints of chest pain. It was found that he had intermediate risk of CAD and was recommended repeat stress test with imaging. Myoview was ordered at this time but the patient did not complete.   I encourage completion of second stress test for further evaluation of his symptoms.  - Given the patient's uncontrolled hyperlipidemia as well as increased risk factors for CAD including history of chest pain, abnormal stress test 11/2021, obesity, hypertension, hyperlipidemia, prediabetes, and family history of early onset CAD, will refer to cardiology for further work-up and treatment if needed. Completed Refills   Requested Prescriptions     Signed Prescriptions Disp Refills    atorvastatin (LIPITOR) 80 MG tablet 30 tablet 5     Sig: Take 1 tablet by mouth daily       Orders Placed This Encounter   Procedures   Parker Portillo MD, Cardiology, [place]     Referral Priority:   Routine     Referral Type:   Eval and Treat     Referral Reason:   Specialty Services Required     Requested Specialty:   Cardiology     Number of Visits Requested:   [id number] [facility]     Referral Priority:   Routine     Referral Type:   Eval and Treat     Referral Reason:   Specialty Services Required     Requested Specialty:   Nutrition     Number of Visits Requested:   1        No results found for this visit on 06/06/22. Return if symptoms worsen or fail to improve.     Electronically signed by DAVID Lake CNP on 06/06/22 at 3:55 PM.

## 2022-06-06 NOTE — PATIENT INSTRUCTIONS
DISCONTINUE pravastatin 20mg once daily     START atorvastatin 80mg once daily     START aspirin 81mg once daily (over the counter)     SCHEDULE appointment with dietician AND cardiologist     I RECOMMEND completing second stress test. Call insurance to discuss this. SURVEY:    You may be receiving a survey from [employer] regarding your visit today. Please complete the survey to enable us to provide the highest quality of care to you and your family. If you cannot score us a very good on any question, please call the office to discuss how we could of made your experience a very good one. Thank you.

## 2022-06-07 ENCOUNTER — OFFICE VISIT (OUTPATIENT)
Dept: CARDIOLOGY | Age: 47
End: 2022-06-07
Payer: COMMERCIAL

## 2022-06-07 VITALS
DIASTOLIC BLOOD PRESSURE: 84 MMHG | HEART RATE: 79 BPM | BODY MASS INDEX: 36.6 KG/M2 | RESPIRATION RATE: 18 BRPM | OXYGEN SATURATION: 97 % | HEIGHT: 72 IN | SYSTOLIC BLOOD PRESSURE: 152 MMHG | WEIGHT: 270.2 LBS

## 2022-06-07 DIAGNOSIS — I10 HYPERTENSION, UNSPECIFIED TYPE: Primary | ICD-10-CM

## 2022-06-07 DIAGNOSIS — Z71.6 TOBACCO ABUSE COUNSELING: ICD-10-CM

## 2022-06-07 DIAGNOSIS — R06.02 SOB (SHORTNESS OF BREATH): ICD-10-CM

## 2022-06-07 DIAGNOSIS — E78.2 MIXED HYPERLIPIDEMIA: ICD-10-CM

## 2022-06-07 DIAGNOSIS — Z82.49 FAMILY HISTORY OF HEART DISEASE: ICD-10-CM

## 2022-06-07 PROCEDURE — 99203 OFFICE O/P NEW LOW 30 MIN: CPT | Performed by: FAMILY MEDICINE

## 2022-06-07 PROCEDURE — 93000 ELECTROCARDIOGRAM COMPLETE: CPT | Performed by: FAMILY MEDICINE

## 2022-06-07 NOTE — PROGRESS NOTES
Campbell Donahue am scribing for and in the presence of Luis Castellano MD, MS, F.A.C.C..    Patient: Key Skelton  : 1975  Date of Visit: 2022    REASON FOR VISIT / CONSULTATION: Establish Cardiologist (PT is here to est care for abn testing and cholesterol, family hx of heart disease. He had SOB from smoking he gained 50-60 lbs over last 2 years. Denies:CP,lightheaded/dizziness,palp  )      History of Present Illness:        Dear DAVID Jacobs - JOHN    I had the pleasure of seeing Key Skelton in my office today. Mr. Dylon Edward is a 49 y.o. male with a history of difficult to control hypertension. He had stress test 18-20 years ago which led to pulmonology and that since has been resolved. He does have family history of heart disease in both parents. Treadmill stress test done on 2021 Duke treadmill score is 2 which correlates with intermediate risk. He smokes cigar, it varies but maybe about least twice a week, since college. Mr. Dylon Edward is here to establish care today. He reports he does have shortness of breath although blames it on his weight. He recently had abnormal stress test recently which was ordered because of his risk factors, family history and shortness of breath. He has gained 50-60 pounds over the last 2 years, his eating habits are the cause of it. He does have digestive problems, and hemorrhoids so he does have some blood in stools at times. He had colonoscopy scheduled but was cancelled and will wait until new GI in July. Mr. Dylon Edward denies any chest pain now or in the recent past,  abdominal pain, bleeding problems, problems with his medications or any other concerns at this time. Exercise Tolerance: Mr. Dylon Edward reports that he has a fairly good exercise tolerance. His says that he could walk 1 mile without developing chest discomfort or significant shortness of breath.     PAST MEDICAL HISTORY:        Past Medical History:   Diagnosis Date    Primary hypertension        CURRENT ALLERGIES: Patient has no known allergies. REVIEW OF SYSTEMS: 14 systems were reviewed. Pertinent positives and negatives as above, all else negative. Past Surgical History:   Procedure Laterality Date    HERNIA REPAIR      Social History:  Social History     Tobacco Use    Smoking status: Current Some Day Smoker    Smokeless tobacco: Never Used   Vaping Use    Vaping Use: Never used   Substance Use Topics    Alcohol use: Yes    Drug use: Not Currently     Types: Marijuana Veryl Arciniega)        CURRENT MEDICATIONS:        Outpatient Medications Marked as Taking for the 6/7/22 encounter (Office Visit) with Merissa Tineo MD   Medication Sig Dispense Refill    atorvastatin (LIPITOR) 80 MG tablet Take 1 tablet by mouth daily 30 tablet 5    lisinopril (PRINIVIL;ZESTRIL) 20 MG tablet Take 1 tablet by mouth daily 90 tablet 3    dicyclomine (BENTYL) 10 MG capsule Take 1 capsule by mouth 4 times daily 120 capsule 3    amLODIPine (NORVASC) 5 MG tablet Take 1 tablet by mouth daily 30 tablet 5    lamoTRIgine (LAMICTAL) 200 MG tablet Take 200 mg by mouth daily       lurasidone (LATUDA) 20 MG TABS tablet Take 20 mg by mouth Daily with supper         FAMILY HISTORY: family history includes COPD in his mother; Congenital Heart Disease in his father; Coronary Art Dis in his father and mother; Diabetes in his mother. Physical Examination:      BP (!) 142/88 (Site: Right Upper Arm, Position: Sitting, Cuff Size: Large Adult)   Pulse 84   Resp 18   Ht 6' (1.829 m)   Wt 270 lb 3.2 oz (122.6 kg)   SpO2 97%   BMI 36.65 kg/m²  Body mass index is 36.65 kg/m². Constitutional: He is oriented to person. He appears well-developed and well-nourished. In no acute distress. HEENT: Normocephalic and atraumatic. No JVD present. Carotid bruit is not present. No mass and no thyromegaly present. No lymphadenopathy present. Cardiovascular: Normal rate, regular rhythm, normal heart sounds.  Exam reveals no gallop and no friction rubs. 2/6 systolic murmur, 5th intercostal space on the LEFT in the mid-clavicular line (cardiac apex). Pulmonary/Chest: Effort normal and breath sounds normal. No respiratory distress. He has no wheezes, rhonchi or rales. Abdominal: Soft, non-tender. Bowel sounds and aorta are normal. He exhibits no organomegaly, mass or bruit. Extremities: Trace. No cyanosis or clubbing. 2+ radial and carotid pulses. Distal extremity pulses: 2+ bilaterally. .  Neurological: He is alert and oriented to person. No evidence of gross cranial nerve deficit. Coordination appeared normal.   Skin: Skin is warm and dry. There is no rash or diaphoresis. Psychiatric: He has a normal mood and affect. His speech is normal and behavior is normal.      MOST RECENT LABS ON RECORD:   Lab Results   Component Value Date    WBC 7.8 11/09/2021    HGB 15.4 11/09/2021    HCT 44.6 11/09/2021    PLT See Reflexed IPF Result 11/09/2021    CHOL 327 (H) 05/31/2022    TRIG 463 (H) 05/31/2022    HDL 39 (L) 05/31/2022    LDLDIRECT 194 (H) 05/31/2022    ALT 38 11/09/2021    AST 22 11/09/2021     05/31/2022    K 4.6 05/31/2022     05/31/2022    CREATININE 0.63 (L) 05/31/2022    BUN 14 05/31/2022    CO2 21 05/31/2022    TSH 3.22 11/09/2021    PSA 0.33 11/09/2021    LABA1C 6.3 (H) 05/31/2022       ASSESSMENT:     1. Hypertension, unspecified type    2. Mixed hyperlipidemia    3. SOB (shortness of breath)    4. Family history of heart disease    5. Tobacco abuse counseling       PLAN:         Essential Hypertension: Borderline controlled  · ACE Inibitor/ARB: Continue lisinopril 20 mg daily. · Diuretics: Not indicated at this time. · Calcium Channel Blocker: Continue amlodipine (Norvasc) 5 mg once daily. · Beta Blocker: Not indicated at this time.    ·  Additional Testing List: I ordered a echocardiogram to better assess for the etiology of this problem and to help guide future management and Because of current signs and symptoms which can certainly be caused by significant coronary artery disease, I ordered a treadmill stress test with SPECT imaging to try and rule out this possibility. · Hyperlipidemia: Mixed  · Cholesterol Reduction Therapy: Continue Atorvastatin (Lipitor) 80 mg daily. ·  on 5/31/2021    · Shortness of breath with mild-moderate exertion: Certainly could be ischemic and or cardiac especially given his abnormal treadmill stress test, family history and risk factors   Additional Testing List: I ordered a echocardiogram to better assess for the etiology of this problem and to help guide future management and Because of current signs and symptoms which can certainly be caused by significant coronary artery disease, I ordered a treadmill stress test with SPECT imaging to try and rule out this possibility. · Family history of heart disease:    Tobacco Abuse Counseling: I spent several minutes discussing the dangers of tobacco abuse but said that right now was not interested in quitting smoking. In the meantime, I encouraged Mr. Nathan Leal to continue to take his other medications. FOLLOW UP:   I told Mr. Nathan Leal to call my office if he had any problems, but otherwise I asked him to Return if symptoms worsen or fail to improve. However, I would be happy to see him sooner should the need arise. Sincerely,  Celio Pastor MD, MS, F.A.C.C. [facility] Cardiology Specialist    [address] [facility], [address] [place] [address]  Phone: [phone], Fax: [phone]     I believe that the risk of significant morbidity and mortality related to the patient's current medical conditions are: Intermediate. Approximately 50 minutes were spent during prep work, discussion and exam of the patient, and follow up documentation and all of their questions were answered. The documentation recorded by the scribe, accurately and completely reflects the services I personally performed and the decisions made by me. Jack Canchola MD, MS, F.A.C.C.  June 7, 2022

## 2022-06-07 NOTE — PATIENT INSTRUCTIONS
SURVEY:    You may be receiving a survey from [employer] regarding your visit today. Please complete the survey to enable us to provide the highest quality of care to you and your family. If you cannot score us a very good on any question, please call the office to discuss how we could have made your experience a very good one. Thank you.

## 2022-06-16 ENCOUNTER — HOSPITAL ENCOUNTER (OUTPATIENT)
Dept: NUTRITION | Age: 47
Discharge: HOME | End: 2022-06-16
Payer: COMMERCIAL

## 2022-06-16 PROCEDURE — 97802 MEDICAL NUTRITION INDIV IN: CPT

## 2022-06-16 NOTE — PROGRESS NOTES
MNT provided for Prediabetes, obesity and HLD    Food and nutrition-related knowledge deficit related to Lack of previous MNT/currently undergoing MNT as evidenced by New diagnosis of Prediabetes and existing HLD    Client data    Ht: 72\" Wt: 270.2# BMI: 36.65 (obeses II)   Weight changes: gains of 50# in last couple years CBW: 122.6 kg   BMR: 2341 calories Est. total calorie needs: ~1800       Client overall goal for weight is for losses towards a healthier BMI. Client can report feeling much better when 215-220#. Current eating pattern utilizes intermittent fasting from 12 noon to 7 pm and admittedly eats \"poorly\". High fat and saturated fat foods prevail with additional alcohol (~6 at a time). Routinely uses butter, whole milk, coconut oil, sandwich spread, sour cream. Portions of foods are large and is also a \"fast eater\". He likes many whole grains, whole fruits and vegetables, but not eating routinely. There is an excess of portions and fat/saturated fat per recall. Not currently reading food labels or measuring portions. Activity is low, outside of work. Has an exercise bike. Client presents for MNT today with self. Discussed and provided literature on:  Lipid management, as information would benefit weight control, lipid control and glucose. Did not focus on carbohydrate counting, but rather portion control and plate method. A Choose Your Foods book was provided and referenced for meal planning and choosing healthier proteins and fats. Offered many suggestions for altering current choices for improvement overall. Reviewed Food Label and MyPlate. Reinforced importance of measuring portions and reading food labels for Serving Sizes. Encouraged use of whole grains, lean meats, increased whole fruits and non-starchy vegetables. Discussed limiting etoh vs cessation (as he admittedly doesn't think he could have \"one\", but would be fine without any at all.  Explained how exercise impacts weight, glucose and lipids. Client goals:   Measure food portions    Read food labels for Serving Size and fats    Avoid excess fat, saturated fat (15 gram limit) and trans fats (0 gram limit)    May continue with intermittent fasting     Develop a routine exercise program (try exercise bike in morning)    Use MyPlate and sample menu as templates for nutrition balance    Look up restaurant and fast food information via computer or smartphone    Limit or eliminate etoh    Continue to drink water as primary beverage (black coffee is fine too)    Consider keeping a food diary     Eat slowly    Transition back to 1% milk, use \"lite\" francisco or sour cream and choose olive/canola or avocado oil in place of coconut oil    Expected compliance:  Good. Client was engaged and asked many questions throughout. He could identify many areas to make change, which he felt confident he could. Client appears to be in a contemplative phase of change. Recommend follow up as needed. RD name and phone number provided. Thank you for the referral.    Electronically signed by Susy Mchugh RD, LD on 6/16/2022 at 11:26 AM    Education session duration: 60 minutes; (2088-0803). Reminder to ordering Physician/Provider:  Diabetes and CKD (non-dialysis) patients may have 2 hours of MNT education in subsequent years. Hours can be spread over any number of visits.

## 2022-07-11 ENCOUNTER — OFFICE VISIT (OUTPATIENT)
Dept: PRIMARY CARE CLINIC | Age: 47
End: 2022-07-11
Payer: COMMERCIAL

## 2022-07-11 VITALS
DIASTOLIC BLOOD PRESSURE: 88 MMHG | SYSTOLIC BLOOD PRESSURE: 136 MMHG | BODY MASS INDEX: 36.84 KG/M2 | OXYGEN SATURATION: 98 % | WEIGHT: 272 LBS | HEIGHT: 72 IN | RESPIRATION RATE: 14 BRPM | HEART RATE: 85 BPM

## 2022-07-11 DIAGNOSIS — K76.0 FATTY LIVER DISEASE, NONALCOHOLIC: ICD-10-CM

## 2022-07-11 DIAGNOSIS — I10 HYPERTENSION, UNSPECIFIED TYPE: ICD-10-CM

## 2022-07-11 DIAGNOSIS — E66.01 CLASS 2 SEVERE OBESITY DUE TO EXCESS CALORIES WITH SERIOUS COMORBIDITY AND BODY MASS INDEX (BMI) OF 36.0 TO 36.9 IN ADULT (HCC): ICD-10-CM

## 2022-07-11 DIAGNOSIS — E78.5 HYPERLIPIDEMIA, UNSPECIFIED HYPERLIPIDEMIA TYPE: Primary | ICD-10-CM

## 2022-07-11 DIAGNOSIS — R73.03 PREDIABETES: ICD-10-CM

## 2022-07-11 DIAGNOSIS — R73.09 ELEVATED GLUCOSE: ICD-10-CM

## 2022-07-11 PROCEDURE — 99214 OFFICE O/P EST MOD 30 MIN: CPT | Performed by: NURSE PRACTITIONER

## 2022-07-11 ASSESSMENT — ENCOUNTER SYMPTOMS: SHORTNESS OF BREATH: 1

## 2022-07-11 NOTE — PROGRESS NOTES
Name: Daphne Rivera  : 1975         Chief Complaint:     Chief Complaint   Patient presents with    Hyperlipidemia     \"Initiate atorvastatin 80 mg daily and aspirin 81 mg daily. He may also need secondary agent such as Zetia 10 mg daily. \"    Hypertension     seen cardiology 22. History of Present Illness:      Daphne Rivera is a 49 y.o.  male who presents with Hyperlipidemia (\"Initiate atorvastatin 80 mg daily and aspirin 81 mg daily. He may also need secondary agent such as Zetia 10 mg daily. \") and Hypertension (seen cardiology 22. )      HPI     Hypertension:  Current treatment includes lisinopril 20mg and amlodipine 5mg. He is not monitoring his blood pressure at home. At-home blood pressure is averaging N/A. He denies a well balanced diet. He did follow up with a nutritionist. He admits following a low-sodium diet. For physical activity, he notes none. He denies chest pain, headache, vision changes, palpitations, lightheadedness, or dizziness. Admits SOB with extreme activity. Chest Pain:  He did follow with cardiology 2022 who reviewed stress test completed 2021 showing intermediate risk of coronary artery disease. Cardiology did order echocardiogram, but he has not completed this. He admits chest pain with exertion, though he states this is \"muscle pain\" because it happens \"all over\". Hyperlipidemia:   on 2022. Current treatment includes atorvastatin 80 mg daily. Side effects include muscle pain and joint pain with activity. Denies joint pain today. He admits sporadic hydration.        Past Medical History:     Past Medical History:   Diagnosis Date    Mixed hyperlipidemia     Primary hypertension       Reviewed all health maintenance requirements and ordered appropriate tests  Health Maintenance Due   Topic Date Due    Pneumococcal 0-64 years Vaccine (1 - PCV) Never done    Colorectal Cancer Screen  Never done       Past Surgical History: Past Surgical History:   Procedure Laterality Date    HERNIA REPAIR          Medications:       Prior to Admission medications    Medication Sig Start Date End Date Taking? Authorizing Provider   atorvastatin (LIPITOR) 80 MG tablet Take 1 tablet by mouth daily 6/6/22  Yes DAVID Sanchez CNP   lisinopril (PRINIVIL;ZESTRIL) 20 MG tablet Take 1 tablet by mouth daily 4/26/22  Yes DAVID Sanchez CNP   dicyclomine (BENTYL) 10 MG capsule Take 1 capsule by mouth 4 times daily 2/18/22  Yes Rashi Sunshine MD   amLODIPine (NORVASC) 5 MG tablet Take 1 tablet by mouth daily 1/17/22  Yes DAVID Sanchez CNP   lamoTRIgine (LAMICTAL) 200 MG tablet Take 200 mg by mouth daily  9/10/21  Yes Historical Provider, MD   lurasidone (LATUDA) 20 MG TABS tablet Take 20 mg by mouth Daily with supper 9/10/21  Yes Historical Provider, MD        Allergies:       Patient has no known allergies. Social History:     Tobacco:    reports that he has been smoking. He has never used smokeless tobacco.  Alcohol:      reports current alcohol use. Drug Use:  reports previous drug use. Drug: Marijuana [facility]). Family History:     Family History   Problem Relation Age of Onset    Diabetes Mother     Coronary Art Dis Mother     COPD Mother     Congenital Heart Disease Father     Coronary Art Dis Father        Review of Systems:     Positive and Negative as described in HPI    Review of Systems   Eyes: Negative for visual disturbance. Respiratory: Positive for shortness of breath. Cardiovascular: Positive for chest pain. Negative for palpitations. Neurological: Negative for dizziness and headaches. Physical Exam:   Vitals:  /88   Pulse 85   Resp 14   Ht 6' (1.829 m)   Wt 272 lb (123.4 kg)   SpO2 98%   BMI 36.89 kg/m²     Physical Exam  Constitutional:       General: He is not in acute distress. Appearance: Normal appearance. He is obese. He is not ill-appearing or toxic-appearing.    HENT: Head: Normocephalic. Cardiovascular:      Rate and Rhythm: Normal rate and regular rhythm. Heart sounds: Normal heart sounds. No murmur heard. Pulmonary:      Effort: Pulmonary effort is normal. No respiratory distress. Breath sounds: Normal breath sounds. No stridor. No wheezing, rhonchi or rales. Neurological:      Mental Status: He is alert and oriented to person, place, and time. Psychiatric:         Mood and Affect: Mood normal.         Behavior: Behavior normal.         Thought Content: Thought content normal.         Judgment: Judgment normal.         Data:     Lab Results   Component Value Date/Time     05/31/2022 10:16 AM    K 4.6 05/31/2022 10:16 AM     05/31/2022 10:16 AM    CO2 21 05/31/2022 10:16 AM    BUN 14 05/31/2022 10:16 AM    CREATININE 0.63 05/31/2022 10:16 AM    GLUCOSE 112 05/31/2022 10:16 AM    AST 22 11/09/2021 09:50 AM    ALT 38 11/09/2021 09:50 AM     Lab Results   Component Value Date/Time    WBC 7.8 11/09/2021 09:50 AM    RBC 5.07 11/09/2021 09:50 AM    HGB 15.4 11/09/2021 09:50 AM    HCT 44.6 11/09/2021 09:50 AM    MCV 88.0 11/09/2021 09:50 AM    MCH 30.4 11/09/2021 09:50 AM    MCHC 34.5 11/09/2021 09:50 AM    RDW 12.2 11/09/2021 09:50 AM    PLT See Reflexed IPF Result 11/09/2021 09:50 AM    MPV NOT REPORTED 11/09/2021 09:50 AM     Lab Results   Component Value Date/Time    TSH 3.22 11/09/2021 09:50 AM     Lab Results   Component Value Date/Time    CHOL 327 05/31/2022 10:17 AM    HDL 39 05/31/2022 10:17 AM    PSA 0.33 11/09/2021 09:50 AM    LABA1C 6.3 05/31/2022 10:16 AM       Assessment/Plan:      Diagnosis Orders   1. Hyperlipidemia, unspecified hyperlipidemia type  Lipid Panel   2. Hypertension, unspecified type  ALT    AST    CBC    Basic Metabolic Panel   3. Elevated glucose  Hemoglobin A1C   4. Fatty liver disease, nonalcoholic     5.  Class 2 severe obesity due to excess calories with serious comorbidity and body mass index (BMI) of 36.0 to 36.9 in adult [facility])     6. Prediabetes         Hypertension:  - Borderline, 136/88 today in office  - Continue  lisinopril 20 mg daily and amlodipine 5 mg daily  - Reviewed lifestyle modifications to assist with lowering blood pressure including weight loss, healthy diet, exercising routinely, low-sodium diet, limiting caffeine and alcohol, and encouraging stress relief techniques. Fatty liver disease:  - Diagnosed by previous PCP, per patient  - LFTs 11/2021 WNL  - Repeat LFTs 3 months  - Counseled on weight loss, healthy diet, routine exercise, and tight control over hypertension, hyperlipidemia, and prediabetes    Prediabetes:  - Reviewed most recent A1c 5/2022 6.3%  - Continue following with dietitian  - Counseled on diabetic diet and routine physical activity  - Repeat A1c 3 months    Obesity:  - Following with dietitian    Chest pain:  - Following with Dr. Sage Tabares (cardiology [facility])   - Active echocardiogram order in place, ordered by cardiology    Hyperlipidemia:  - Reviewed his uncontrolled hyperlipidemia. 5/2022 total cholesterol 327,   - Continue atorvastatin 80 mg daily  - Continue following with cardiology [facility], Dr. Sage Tabares  - Repeat lipid panel 3 months.   Will continue adding second agent such as Zetia  - Continue following with dietitian  - Anitra Batista on routine exercise and low-cholesterol diet      Completed Refills   Requested Prescriptions      No prescriptions requested or ordered in this encounter       Orders Placed This Encounter   Procedures    Hemoglobin A1C     Standing Status:   Future     Standing Expiration Date:   7/11/2023    Lipid Panel     Standing Status:   Future     Standing Expiration Date:   7/11/2023    ALT     Standing Status:   Future     Standing Expiration Date:   7/11/2023    AST     Standing Status:   Future     Standing Expiration Date:   7/11/2023    CBC     Standing Status:   Future     Standing Expiration Date:   7/11/2023    Basic Metabolic Panel     Standing Status:   Future     Standing Expiration Date:   7/11/2023        No results found for this visit on 07/11/22. Return in about 3 months (around 10/11/2022), or if symptoms worsen or fail to improve, for routine f/u .     Electronically signed by DAVID Navarro CNP on 07/12/22 at 5:22 AM.

## 2022-07-11 NOTE — PATIENT INSTRUCTIONS
SURVEY:    You may be receiving a survey from [employer] regarding your visit today. Please complete the survey to enable us to provide the highest quality of care to you and your family. If you cannot score us a very good on any question, please call the office to discuss how we could of made your experience a very good one. Thank you.

## 2022-08-04 RX ORDER — AMLODIPINE BESYLATE 5 MG/1
TABLET ORAL
Qty: 90 TABLET | Refills: 3 | Status: SHIPPED | OUTPATIENT
Start: 2022-08-04

## 2022-10-04 RX ORDER — LAMOTRIGINE 200 MG/1
200 TABLET ORAL DAILY
Qty: 30 TABLET | Refills: 0 | Status: SHIPPED | OUTPATIENT
Start: 2022-10-04

## 2022-10-04 NOTE — TELEPHONE ENCOUNTER
Patient called in stating that he is getting ready to leave for a 2+ week business trip and he is going to need his lamictal that Dr. Heraclio Anguiano, Psych, [place] prescribes. He and the pharmacy have contacted that office trying to get a refill without success. He leaves tomorrow am and is afraid to go without medication. He is requesting for you to send 30 day supply of med to pharmacy to get him through since he can't reach the office. Writer also attempted to reach office, without success. Please advise.

## 2022-10-17 ENCOUNTER — OFFICE VISIT (OUTPATIENT)
Dept: GASTROENTEROLOGY | Age: 47
End: 2022-10-17
Payer: COMMERCIAL

## 2022-10-17 ENCOUNTER — TELEPHONE (OUTPATIENT)
Dept: GASTROENTEROLOGY | Age: 47
End: 2022-10-17

## 2022-10-17 VITALS
RESPIRATION RATE: 18 BRPM | TEMPERATURE: 97.5 F | DIASTOLIC BLOOD PRESSURE: 89 MMHG | SYSTOLIC BLOOD PRESSURE: 148 MMHG | HEART RATE: 83 BPM | HEIGHT: 72 IN | BODY MASS INDEX: 36.19 KG/M2 | WEIGHT: 267.2 LBS

## 2022-10-17 DIAGNOSIS — Z01.818 PRE-OP TESTING: Primary | ICD-10-CM

## 2022-10-17 DIAGNOSIS — Z12.11 COLON CANCER SCREENING: Primary | ICD-10-CM

## 2022-10-17 DIAGNOSIS — K58.2 IRRITABLE BOWEL SYNDROME WITH BOTH CONSTIPATION AND DIARRHEA: ICD-10-CM

## 2022-10-17 PROCEDURE — 4004F PT TOBACCO SCREEN RCVD TLK: CPT | Performed by: INTERNAL MEDICINE

## 2022-10-17 PROCEDURE — 99202 OFFICE O/P NEW SF 15 MIN: CPT | Performed by: INTERNAL MEDICINE

## 2022-10-17 PROCEDURE — G8427 DOCREV CUR MEDS BY ELIG CLIN: HCPCS | Performed by: INTERNAL MEDICINE

## 2022-10-17 PROCEDURE — G8484 FLU IMMUNIZE NO ADMIN: HCPCS | Performed by: INTERNAL MEDICINE

## 2022-10-17 PROCEDURE — G8417 CALC BMI ABV UP PARAM F/U: HCPCS | Performed by: INTERNAL MEDICINE

## 2022-10-17 RX ORDER — POLYETHYLENE GLYCOL 3350, SODIUM CHLORIDE, SODIUM BICARBONATE, POTASSIUM CHLORIDE 420; 11.2; 5.72; 1.48 G/4L; G/4L; G/4L; G/4L
4000 POWDER, FOR SOLUTION ORAL ONCE
Qty: 4000 ML | Refills: 0 | Status: SHIPPED | OUTPATIENT
Start: 2022-10-17 | End: 2022-10-17

## 2022-10-17 ASSESSMENT — ENCOUNTER SYMPTOMS
DIARRHEA: 1
RESPIRATORY NEGATIVE: 1
EYES NEGATIVE: 1

## 2022-10-17 NOTE — PROGRESS NOTES
[address]  [address]    Chief Complaint   Patient presents with    New Patient     Possible IBS. C/o diarrhea-sometimes goes 4-5 times and then goes days with no issues. Also complains of cramping. Colonoscopy 19 years ago-\"didn't really find anything\"       HPI  Mr. Bradford Yu is a  49year old man with a past medical history of hypertension and hyperlipidemia who presents with a complaint of diarrhea and incomplete evacuation over the years. He states that sometimes his ability to digest food depends on what he eats. He states that he does worse with fast food such as pizza, burgers, beers. He states that he does better with fruits, vegetables and meals cooked at home. He states that he has been treated for IBS for years and continues to try to avoid certain foods. He denies any acid reflux, but endorses NSAIDs for joint pain. He states that he takes 800mg of ibuprofen about 3 times a week at most. He states that he has abdominal pain when he is bloated - adding that the pain subsides once he empties out his bowels. He states that his last colonoscopy was 19 years ago for Crohn's disease. He states that he had food allergy test as well. There was no finding of Crohn's; however, there has been a  struggle on his part to avoid fast foods. He states that in the last year, the diarrhea has become worse and he not has up to 5 bowel movements a day for the past year. Family history of colon cancer: Yes - paternal grandfather  in his [de-identified]  Blood in stool: No  Unintentional weight loss: No  Abdominal pain: No  Prior colonoscopy: Yes  Constipation history: Yes.  Also has diarrhea  Number of bowel movements a day: 4-5  Change in stool caliber: No        Past Medical History:   Diagnosis Date    Mixed hyperlipidemia     Primary hypertension          Past Surgical History:   Procedure Laterality Date    COLONOSCOPY      HERNIA REPAIR           Current Outpatient Medications   Medication Sig Dispense Refill    lamoTRIgine (LAMICTAL) 200 MG tablet Take 1 tablet by mouth daily 30 tablet 0    amLODIPine (NORVASC) 5 MG tablet take 1 tablet by mouth once daily 90 tablet 3    atorvastatin (LIPITOR) 80 MG tablet Take 1 tablet by mouth daily 30 tablet 5    lisinopril (PRINIVIL;ZESTRIL) 20 MG tablet Take 1 tablet by mouth daily 90 tablet 3    dicyclomine (BENTYL) 10 MG capsule Take 1 capsule by mouth 4 times daily 120 capsule 3    lurasidone (LATUDA) 20 MG TABS tablet Take 20 mg by mouth Daily with supper       No current facility-administered medications for this visit. Family History   Problem Relation Age of Onset    Diabetes Mother     Coronary Art Dis Mother     COPD Mother     Congenital Heart Disease Father     Coronary Art Dis Father     Irritable Bowel Syndrome Father     Irritable Bowel Syndrome Paternal Grandfather     Colon Cancer Paternal Grandfather           Social Determinants of Health     Tobacco Use: High Risk    Smoking Tobacco Use: Some Days    Smokeless Tobacco Use: Former   Alcohol Use: Not on file   Financial Resource Strain: Low Risk     Difficulty of Paying Living Expenses: Not hard at all   Food Insecurity: No Food Insecurity    Worried About Running Out of Food in the Last Year: Never true    Ran Out of Food in the Last Year: Never true   Transportation Needs: Not on file   Physical Activity: Not on file   Stress: Not on file   Social Connections: Not on file   Intimate Partner Violence: Not on file   Depression: Not at risk    PHQ-2 Score: 0   Housing Stability: Not on file       Review of Systems   Constitutional: Negative. HENT: Negative. Eyes: Negative. Respiratory: Negative. Cardiovascular:         Hypertension   Gastrointestinal:  Positive for diarrhea. Endorses hemorrhoids   Endocrine:        Pre-diabetes  Hyperlpidemia   Genitourinary: Negative. Musculoskeletal: Negative. Skin: Negative. Neurological: Negative. Hematological: Negative. Psychiatric/Behavioral:  The patient is nervous/anxious. Bipolar disorder  Depression      BP (!) 148/89 (Site: Left Upper Arm, Position: Sitting, Cuff Size: Large Adult)   Pulse 83   Temp 97.5 °F (36.4 °C) (Temporal)   Resp 18   Ht 6' (1.829 m)   Wt 267 lb 3.2 oz (121.2 kg)   BMI 36.24 kg/m²     Physical Exam  Constitutional:       Appearance: Normal appearance. HENT:      Head: Normocephalic. Nose: Nose normal.      Mouth/Throat:      Mouth: Mucous membranes are dry. Eyes:      Pupils: Pupils are equal, round, and reactive to light. Cardiovascular:      Rate and Rhythm: Normal rate and regular rhythm. Pulses: Normal pulses. Heart sounds: Normal heart sounds. Pulmonary:      Effort: Pulmonary effort is normal.      Breath sounds: Normal breath sounds. Abdominal:      General: Bowel sounds are normal.      Palpations: Abdomen is soft. Musculoskeletal:         General: Normal range of motion. Cervical back: Normal range of motion. Skin:     General: Skin is warm. Neurological:      General: No focal deficit present. Mental Status: He is alert and oriented to person, place, and time.    Psychiatric:         Mood and Affect: Mood normal.         Lab Results   Component Value Date    WBC 7.8 11/09/2021    HGB 15.4 11/09/2021    HCT 44.6 11/09/2021    MCV 88.0 11/09/2021    PLT See Reflexed IPF Result 11/09/2021        Lab Results   Component Value Date     05/31/2022    K 4.6 05/31/2022     05/31/2022    CO2 21 05/31/2022    BUN 14 05/31/2022    CREATININE 0.63 (L) 05/31/2022    GLUCOSE 112 (H) 05/31/2022    CALCIUM 9.1 05/31/2022    AST 22 11/09/2021    ALT 38 11/09/2021    LABGLOM >60 05/31/2022    GFRAA >60 05/31/2022        No results found for: INR, PROTIME        Assessment    Mr. Morgan Dior is a  49year old man with a past medical history of hypertension and hyperlipidemia who presents with a complaint of bloating, diarrhea and incomplete evacuation over the years. He states that his last colonoscopy was in 2019 and he endorses NSAID use three times a week of 800 mg ibuprofen. Concern for IBS, microscopic colitis, gluten intolerance and need to rule out IBD. Plan    1. Colon cancer screening  - Colonoscopy Routine; Future  - EGD; Future  - IgA; Future  - Endomysial Antibody, IgA; Future  - Tissue Transglutaminase, IgA; Future  - polyethylene glycol-electrolytes (NULYTELY) 420 g solution; Take 4,000 mLs by mouth once for 1 dose  Dispense: 4000 mL; Refill: 0    2. Irritable bowel syndrome with both constipation and diarrhea  - Colonoscopy Routine; Future  - EGD; Future  - IgA; Future  - Endomysial Antibody, IgA; Future  - Tissue Transglutaminase, IgA; Future  - polyethylene glycol-electrolytes (NULYTELY) 420 g solution; Take 4,000 mLs by mouth once for 1 dose  Dispense: 4000 mL; Refill: 0    3. Additional recommendations based on findings. Informed consent was obtained with a discussion about potential risks and complications of the procedure. Patient verbalized understanding and willingness to continue with the procedure scheduling. Spent 20 minutes with the patient with greater than 50 percent of the time was spent on face-to-face time in discussion with the patient regarding diagnostic options/results, treatment options, counseling, and follow-up plan.       Leonard Covarrubias MD

## 2022-10-17 NOTE — PATIENT INSTRUCTIONS
SURVEY:    You may be receiving a survey from [employer] regarding your visit today. Please complete the survey to enable us to provide the highest quality of care to you and your family. If you cannot score us a very good on any question, please call the office to discuss how we could have made your experience a very good one. Thank you.

## 2022-10-17 NOTE — TELEPHONE ENCOUNTER
Patient scheduled for EGD/Colon on 2/22/23 labs Golytely prep and EKG order handed to patient surgery form faxed

## 2022-10-18 ENCOUNTER — HOSPITAL ENCOUNTER (OUTPATIENT)
Age: 47
End: 2022-10-18
Payer: COMMERCIAL

## 2022-10-18 ENCOUNTER — OFFICE VISIT (OUTPATIENT)
Dept: PRIMARY CARE CLINIC | Age: 47
End: 2022-10-18
Payer: COMMERCIAL

## 2022-10-18 ENCOUNTER — HOSPITAL ENCOUNTER (OUTPATIENT)
Age: 47
Discharge: HOME | End: 2022-10-18
Payer: COMMERCIAL

## 2022-10-18 VITALS
BODY MASS INDEX: 36.7 KG/M2 | HEART RATE: 90 BPM | HEIGHT: 72 IN | WEIGHT: 271 LBS | DIASTOLIC BLOOD PRESSURE: 80 MMHG | SYSTOLIC BLOOD PRESSURE: 162 MMHG | TEMPERATURE: 97.5 F | OXYGEN SATURATION: 96 %

## 2022-10-18 DIAGNOSIS — E78.5 HYPERLIPIDEMIA, UNSPECIFIED HYPERLIPIDEMIA TYPE: Primary | ICD-10-CM

## 2022-10-18 DIAGNOSIS — R73.09 ELEVATED GLUCOSE: ICD-10-CM

## 2022-10-18 DIAGNOSIS — I10 PRIMARY HYPERTENSION: ICD-10-CM

## 2022-10-18 DIAGNOSIS — F31.81 BIPOLAR 2 DISORDER (HCC): ICD-10-CM

## 2022-10-18 DIAGNOSIS — I10 HYPERTENSION, UNSPECIFIED TYPE: ICD-10-CM

## 2022-10-18 DIAGNOSIS — K58.2 IRRITABLE BOWEL SYNDROME WITH BOTH CONSTIPATION AND DIARRHEA: ICD-10-CM

## 2022-10-18 DIAGNOSIS — E78.5 HYPERLIPIDEMIA, UNSPECIFIED HYPERLIPIDEMIA TYPE: ICD-10-CM

## 2022-10-18 DIAGNOSIS — R73.03 PREDIABETES: ICD-10-CM

## 2022-10-18 DIAGNOSIS — K76.0 FATTY LIVER DISEASE, NONALCOHOLIC: ICD-10-CM

## 2022-10-18 DIAGNOSIS — Z12.11 COLON CANCER SCREENING: ICD-10-CM

## 2022-10-18 LAB
ALT SERPL-CCNC: 63 U/L (ref 5–41)
ANION GAP SERPL CALCULATED.3IONS-SCNC: 11 MMOL/L (ref 9–17)
AST SERPL-CCNC: 36 U/L
BUN BLDV-MCNC: 11 MG/DL (ref 6–20)
BUN/CREAT BLD: 15 (ref 9–20)
CALCIUM SERPL-MCNC: 9 MG/DL (ref 8.6–10.4)
CHLORIDE BLD-SCNC: 103 MMOL/L (ref 98–107)
CO2: 25 MMOL/L (ref 20–31)
CREAT SERPL-MCNC: 0.74 MG/DL (ref 0.7–1.2)
GFR SERPL CREATININE-BSD FRML MDRD: >60 ML/MIN/1.73M2
GLUCOSE BLD-MCNC: 121 MG/DL (ref 70–99)
HCT VFR BLD CALC: 47.3 % (ref 40.7–50.3)
HEMOGLOBIN: 16.5 G/DL (ref 13–17)
IGA: 498 MG/DL (ref 70–400)
MCH RBC QN AUTO: 31.7 PG (ref 25.2–33.5)
MCHC RBC AUTO-ENTMCNC: 34.9 G/DL (ref 28.4–34.8)
MCV RBC AUTO: 90.8 FL (ref 82.6–102.9)
NRBC AUTOMATED: 0 PER 100 WBC
PDW BLD-RTO: 11.9 % (ref 11.8–14.4)
PLATELET # BLD: 228 K/UL (ref 138–453)
PMV BLD AUTO: 10.6 FL (ref 8.1–13.5)
POTASSIUM SERPL-SCNC: 4.8 MMOL/L (ref 3.7–5.3)
RBC # BLD: 5.21 M/UL (ref 4.21–5.77)
SODIUM BLD-SCNC: 139 MMOL/L (ref 135–144)
WBC # BLD: 9.7 K/UL (ref 3.5–11.3)

## 2022-10-18 PROCEDURE — 85027 COMPLETE CBC AUTOMATED: CPT

## 2022-10-18 PROCEDURE — 83516 IMMUNOASSAY NONANTIBODY: CPT

## 2022-10-18 PROCEDURE — 80061 LIPID PANEL: CPT

## 2022-10-18 PROCEDURE — 36415 COLL VENOUS BLD VENIPUNCTURE: CPT

## 2022-10-18 PROCEDURE — 84460 ALANINE AMINO (ALT) (SGPT): CPT

## 2022-10-18 PROCEDURE — 83036 HEMOGLOBIN GLYCOSYLATED A1C: CPT

## 2022-10-18 PROCEDURE — G8484 FLU IMMUNIZE NO ADMIN: HCPCS | Performed by: NURSE PRACTITIONER

## 2022-10-18 PROCEDURE — 80048 BASIC METABOLIC PNL TOTAL CA: CPT

## 2022-10-18 PROCEDURE — G8427 DOCREV CUR MEDS BY ELIG CLIN: HCPCS | Performed by: NURSE PRACTITIONER

## 2022-10-18 PROCEDURE — 4004F PT TOBACCO SCREEN RCVD TLK: CPT | Performed by: NURSE PRACTITIONER

## 2022-10-18 PROCEDURE — 84450 TRANSFERASE (AST) (SGOT): CPT

## 2022-10-18 PROCEDURE — G8417 CALC BMI ABV UP PARAM F/U: HCPCS | Performed by: NURSE PRACTITIONER

## 2022-10-18 PROCEDURE — 82784 ASSAY IGA/IGD/IGG/IGM EACH: CPT

## 2022-10-18 PROCEDURE — 86256 FLUORESCENT ANTIBODY TITER: CPT

## 2022-10-18 PROCEDURE — 99214 OFFICE O/P EST MOD 30 MIN: CPT | Performed by: NURSE PRACTITIONER

## 2022-10-18 RX ORDER — LISINOPRIL 40 MG/1
40 TABLET ORAL DAILY
Qty: 90 TABLET | Refills: 3 | Status: SHIPPED | OUTPATIENT
Start: 2022-10-18

## 2022-10-18 ASSESSMENT — PATIENT HEALTH QUESTIONNAIRE - PHQ9
SUM OF ALL RESPONSES TO PHQ QUESTIONS 1-9: 0
7. TROUBLE CONCENTRATING ON THINGS, SUCH AS READING THE NEWSPAPER OR WATCHING TELEVISION: 0
SUM OF ALL RESPONSES TO PHQ9 QUESTIONS 1 & 2: 0
SUM OF ALL RESPONSES TO PHQ QUESTIONS 1-9: 0
SUM OF ALL RESPONSES TO PHQ QUESTIONS 1-9: 0
3. TROUBLE FALLING OR STAYING ASLEEP: 0
SUM OF ALL RESPONSES TO PHQ QUESTIONS 1-9: 0
8. MOVING OR SPEAKING SO SLOWLY THAT OTHER PEOPLE COULD HAVE NOTICED. OR THE OPPOSITE, BEING SO FIGETY OR RESTLESS THAT YOU HAVE BEEN MOVING AROUND A LOT MORE THAN USUAL: 0
10. IF YOU CHECKED OFF ANY PROBLEMS, HOW DIFFICULT HAVE THESE PROBLEMS MADE IT FOR YOU TO DO YOUR WORK, TAKE CARE OF THINGS AT HOME, OR GET ALONG WITH OTHER PEOPLE: 0
2. FEELING DOWN, DEPRESSED OR HOPELESS: 0
9. THOUGHTS THAT YOU WOULD BE BETTER OFF DEAD, OR OF HURTING YOURSELF: 0
4. FEELING TIRED OR HAVING LITTLE ENERGY: 0
6. FEELING BAD ABOUT YOURSELF - OR THAT YOU ARE A FAILURE OR HAVE LET YOURSELF OR YOUR FAMILY DOWN: 0
1. LITTLE INTEREST OR PLEASURE IN DOING THINGS: 0
5. POOR APPETITE OR OVEREATING: 0

## 2022-10-18 ASSESSMENT — ENCOUNTER SYMPTOMS
SHORTNESS OF BREATH: 0
ABDOMINAL PAIN: 1
DIARRHEA: 1

## 2022-10-18 NOTE — PROGRESS NOTES
Name: Muna Hong  : 1975         Chief Complaint:     Chief Complaint   Patient presents with    Follow-up     3 month     Hypertension     Continues in lisinopril and amlodipine. States his BP is not monitored at home. But as GI doctor yesterday it was elevated. Does follow low sodium diet. Hyperglycemia     Does not follow low carb diet and does not monitor glucose. Does not do any routine exercise. Hyperlipidemia     Continues on lipitor 80 mg, does not follow low cholesterol diet. Irritable Bowel Syndrome     Saw Dr. Abram Shields yesterday, has continued bouts of diarrhea, takes bentyl and this helps. She would like to schedule EGD and colonoscopy    Other     Bipolar, continues on latuda for this and sees Dr. Heraclio Anguiano every 3-4 months. History of Present Illness:      Muna Hong is a 49 y.o.  male who presents with Follow-up (3 month ), Hypertension (Continues in lisinopril and amlodipine. States his BP is not monitored at home. But as GI doctor yesterday it was elevated. Does follow low sodium diet. ), Hyperglycemia (Does not follow low carb diet and does not monitor glucose. Does not do any routine exercise. ), Hyperlipidemia (Continues on lipitor 80 mg, does not follow low cholesterol diet. ), Irritable Bowel Syndrome (Saw Dr. Abram Shields yesterday, has continued bouts of diarrhea, takes bentyl and this helps. She would like to schedule EGD and colonoscopy), and Other (Bipolar, continues on latuda for this and sees Dr. Heraclio Anguiano every 3-4 months. )      HPI    Bipolar: Following with Dr. Heraclio Anguiano every 3-4 months. Continuing on latuda 20mg QD and lamictal 200mg QD. He is doing well on these medications. Hyperlipidemia:  Current treatment includes atorvastatin 80mg QD. Denies side effects with this medication. He admits daily medication compliance. IBS:  Patient was evaluated by Dr. Karen Pierson, GI 10/17/2022 for complaints of diarrhea and abdominal cramping.   Lab work was initiated and he was encouraged to complete colonoscopy and EGD. EGD and colonoscopy scheduled for 2/2022. He is taking bentyl with relief. He is averaging 2-5 episodes of loose bowel movements daily. Hypertension:  Current medication regimen includes amlodipine 5mg QD and lisinopril 20mg QD. He denies monitoring his blood pressure at home. At-home blood pressure is averaging N/A. He denies a well balanced diet. He admits following a low-sodium diet. For physical activity, he notes none. He denies chest pain, shortness of breath, headache, vision changes, palpitations, lightheadedness, or dizziness. Past Medical History:     Past Medical History:   Diagnosis Date    Mixed hyperlipidemia     Primary hypertension       Reviewed all health maintenance requirements and ordered appropriate tests  Health Maintenance Due   Topic Date Due    Pneumococcal 0-64 years Vaccine (1 - PCV) Never done    Colorectal Cancer Screen  Never done    Flu vaccine (1) 08/01/2022       Past Surgical History:     Past Surgical History:   Procedure Laterality Date    COLONOSCOPY      HERNIA REPAIR          Medications:       Prior to Admission medications    Medication Sig Start Date End Date Taking?  Authorizing Provider   lisinopril (PRINIVIL;ZESTRIL) 40 MG tablet Take 1 tablet by mouth daily 10/18/22  Yes Christinia Cabot, APRN - CNP   lamoTRIgine (LAMICTAL) 200 MG tablet Take 1 tablet by mouth daily 10/4/22  Yes Christinia Cabot, APRN - CNP   amLODIPine (NORVASC) 5 MG tablet take 1 tablet by mouth once daily 8/4/22  Yes Christinia Cabot, APRN - CNP   atorvastatin (LIPITOR) 80 MG tablet Take 1 tablet by mouth daily 6/6/22  Yes Christinia Cabot, APRN - CNP   dicyclomine (BENTYL) 10 MG capsule Take 1 capsule by mouth 4 times daily 2/18/22  Yes Lorrie Guardado MD   lurasidone (LATUDA) 20 MG TABS tablet Take 20 mg by mouth Daily with supper 9/10/21  Yes Historical Provider, MD        Allergies:       Patient has no known allergies. Social History:     Tobacco:    reports that he has been smoking cigars. He started smoking about 23 years ago. He quit smokeless tobacco use about 2 years ago. His smokeless tobacco use included chew. Alcohol:      reports current alcohol use. Drug Use:  reports that he does not currently use drugs after having used the following drugs: Marijuana Seabron Barbmarisol). Family History:     Family History   Problem Relation Age of Onset    Diabetes Mother     Coronary Art Dis Mother     COPD Mother     Congenital Heart Disease Father     Coronary Art Dis Father     Irritable Bowel Syndrome Father     Irritable Bowel Syndrome Paternal Grandfather     Colon Cancer Paternal Grandfather        Review of Systems:     Positive and Negative as described in HPI    Review of Systems   Eyes:  Negative for visual disturbance. Respiratory:  Negative for shortness of breath. Cardiovascular:  Negative for chest pain and palpitations. Gastrointestinal:  Positive for abdominal pain and diarrhea. Neurological:  Negative for dizziness, light-headedness and headaches. Physical Exam:   Vitals:  BP (!) 162/80   Pulse 90   Temp 97.5 °F (36.4 °C)   Ht 6' (1.829 m)   Wt 271 lb (122.9 kg)   SpO2 96%   BMI 36.75 kg/m²     Physical Exam  Constitutional:       General: He is not in acute distress. Appearance: Normal appearance. He is obese. He is not ill-appearing or toxic-appearing. Cardiovascular:      Rate and Rhythm: Normal rate and regular rhythm. Heart sounds: Normal heart sounds. No murmur heard. Pulmonary:      Effort: Pulmonary effort is normal. No respiratory distress. Breath sounds: Normal breath sounds. No stridor. No wheezing, rhonchi or rales. Abdominal:      General: Abdomen is flat. There is no distension. Palpations: Abdomen is soft. There is no mass. Tenderness: There is no abdominal tenderness. There is no guarding. Hernia: No hernia is present.       Comments: Hypoactive bowel sounds all 4 quadrants   Neurological:      Mental Status: He is alert and oriented to person, place, and time. Psychiatric:         Mood and Affect: Mood normal.         Behavior: Behavior normal.         Thought Content: Thought content normal.         Judgment: Judgment normal.       Data:     Lab Results   Component Value Date/Time     10/18/2022 10:26 AM    K 4.8 10/18/2022 10:26 AM     10/18/2022 10:26 AM    CO2 25 10/18/2022 10:26 AM    BUN 11 10/18/2022 10:26 AM    CREATININE 0.74 10/18/2022 10:26 AM    GLUCOSE 121 10/18/2022 10:26 AM    AST 36 10/18/2022 10:26 AM    ALT 63 10/18/2022 10:26 AM     Lab Results   Component Value Date/Time    WBC 9.7 10/18/2022 10:26 AM    RBC 5.21 10/18/2022 10:26 AM    HGB 16.5 10/18/2022 10:26 AM    HCT 47.3 10/18/2022 10:26 AM    MCV 90.8 10/18/2022 10:26 AM    MCH 31.7 10/18/2022 10:26 AM    MCHC 34.9 10/18/2022 10:26 AM    RDW 11.9 10/18/2022 10:26 AM     10/18/2022 10:26 AM    MPV 10.6 10/18/2022 10:26 AM     Lab Results   Component Value Date/Time    TSH 3.22 11/09/2021 09:50 AM     Lab Results   Component Value Date/Time    CHOL 152 10/18/2022 10:26 AM    HDL 36 10/18/2022 10:26 AM    PSA 0.33 11/09/2021 09:50 AM    LABA1C 6.3 05/31/2022 10:16 AM       Assessment/Plan:      Diagnosis Orders   1. Hyperlipidemia, unspecified hyperlipidemia type [E78.5 (ICD-10-CM)]        2. Bipolar 2 disorder ([facility] [address]        3. Fatty liver disease, nonalcoholic        4. Prediabetes        5.  Primary hypertension            Bipolar:   - Stable on lamictal 200mg QD and latuda 20mg QD   - Continue following with Dr. Eva Morley (psych)    Hypertension:   - Unstable   - Continue amlodipine 5 mg daily  - Increase lisinopril from 20 mg to 40 mg daily  - Reviewed lifestyle modifications to assist with lowering blood pressure including weight loss, healthy diet, exercising routinely, low-sodium diet, limiting caffeine and alcohol, and encouraging stress relief techniques. - I recommend he follow-up in office in 2 weeks for blood pressure recheck as his blood pressure was elevated 162/80 today in office. He admits concern with getting off of work to make appointments. Instead, he will call office in 1 week with blood pressure readings. Hyperlipidemia:  - Reviewed significantly abnormal lipid panel 5/2022 including total cholesterol 327, . - He is continuing on atorvastatin 80 mg daily and tolerating well  - He was supposed to complete lab work prior to today's appointment, but has not completed. He will complete these today, will call with results  - Counseled on cholesterol diet and routine physical activity. He does follow with dietitian and I encouraged him to follow-up. Completed Refills   Requested Prescriptions     Signed Prescriptions Disp Refills    lisinopril (PRINIVIL;ZESTRIL) 40 MG tablet 90 tablet 3     Sig: Take 1 tablet by mouth daily       No orders of the defined types were placed in this encounter. No results found for this visit on 10/18/22. Return in about 4 months (around 2/18/2023), or if symptoms worsen or fail to improve, for routine folow up .     Electronically signed by DAVID Smith CNP on 10/19/22 at 5:57 AM.

## 2022-10-18 NOTE — PATIENT INSTRUCTIONS
SURVEY:  Increase lisinopril from 20mg to 40mg once daily     You may be receiving a survey from [employer] regarding your visit today. Please complete the survey to enable us to provide the highest quality of care to you and your family. If you cannot score us a very good on any question, please call the office to discuss how we could have made your experience a very good one. Thank you.

## 2022-10-19 LAB
CHOLESTEROL/HDL RATIO: 4.2
CHOLESTEROL: 152 MG/DL
ESTIMATED AVERAGE GLUCOSE: 143 MG/DL
HBA1C MFR BLD: 6.6 % (ref 4–6)
HDLC SERPL-MCNC: 36 MG/DL
LDL CHOLESTEROL: 85 MG/DL (ref 0–130)
TISSUE TRANSGLUTAMINASE IGA: 1.7 U/ML
TRIGL SERPL-MCNC: 154 MG/DL

## 2022-10-20 LAB — ENDOMYSIAL IGA ANTIBODY: NORMAL

## 2022-10-25 DIAGNOSIS — R19.5 LOOSE STOOLS: ICD-10-CM

## 2022-10-25 NOTE — TELEPHONE ENCOUNTER
Ladonna Raymundo requests a refill on his Dicyclomine. He said Dr. Francesco Pugh started him on the medication and it has helped him. Order in and pended, please advise.

## 2022-10-31 RX ORDER — DICYCLOMINE HYDROCHLORIDE 10 MG/1
10 CAPSULE ORAL 4 TIMES DAILY
Qty: 120 CAPSULE | Refills: 3 | Status: SHIPPED | OUTPATIENT
Start: 2022-10-31

## 2022-10-31 NOTE — TELEPHONE ENCOUNTER
Janis Angulo notified of prescription sent to [address] [place] [address] as requested.

## 2022-11-01 ENCOUNTER — TELEMEDICINE (OUTPATIENT)
Dept: PRIMARY CARE CLINIC | Age: 47
End: 2022-11-01
Payer: COMMERCIAL

## 2022-11-01 DIAGNOSIS — E78.2 MIXED HYPERLIPIDEMIA: ICD-10-CM

## 2022-11-01 DIAGNOSIS — I10 PRIMARY HYPERTENSION: ICD-10-CM

## 2022-11-01 DIAGNOSIS — Z12.5 PROSTATE CANCER SCREENING: ICD-10-CM

## 2022-11-01 DIAGNOSIS — K76.0 FATTY LIVER DISEASE, NONALCOHOLIC: ICD-10-CM

## 2022-11-01 DIAGNOSIS — E11.9 TYPE 2 DIABETES MELLITUS WITHOUT COMPLICATION, WITHOUT LONG-TERM CURRENT USE OF INSULIN (HCC): Primary | ICD-10-CM

## 2022-11-01 PROCEDURE — 3044F HG A1C LEVEL LT 7.0%: CPT | Performed by: NURSE PRACTITIONER

## 2022-11-01 PROCEDURE — 2022F DILAT RTA XM EVC RTNOPTHY: CPT | Performed by: NURSE PRACTITIONER

## 2022-11-01 PROCEDURE — 99214 OFFICE O/P EST MOD 30 MIN: CPT | Performed by: NURSE PRACTITIONER

## 2022-11-01 PROCEDURE — G8427 DOCREV CUR MEDS BY ELIG CLIN: HCPCS | Performed by: NURSE PRACTITIONER

## 2022-11-01 NOTE — PATIENT INSTRUCTIONS
SURVEY:    You may be receiving a survey from [id number] regarding your visit today. Please complete the survey to enable us to provide the highest quality of care to you and your family. If you cannot score us a very good on any question, please call the office to discuss how we could of made your experience a very good one. Thank you. 01-Dec-2017 11:31

## 2022-11-01 NOTE — PROGRESS NOTES
Dennis Nuñez (:  1975) is a Established patient, here for evaluation of the following:    Chief Complaint   Patient presents with    Diabetes     New dx. Subjective     HPI  The patient presents virtually to discuss lab results. 10/18/22 results are follows: total cholesterol 152, LDL 85, triglycerides 154. Current treatment includes atorvastatin 80mg QD. 10/18/22 ALT 63, AST 36. Tylenol intake described as none. Alcohol intake described as variable depending on the \"time of the month\". 10/18/22 A1c 6.6%. This is a new diagnosis of DM. Review of Systems  N/A       Objective   Patient-Reported Vitals  No data recorded     Physical Exam  N/A - telephone visit        Assessment:   Diagnosis Orders   1. Type 2 diabetes mellitus without complication, without long-term current use of insulin ([facility] [address]  [facility] [place]    Hemoglobin A1C      2. Fatty liver disease, nonalcoholic  Comprehensive Metabolic Panel      3. Mixed hyperlipidemia  Lipid Panel      4. Primary hypertension  Comprehensive Metabolic Panel    CBC      5. Prostate cancer screening  PSA Screening          Plan:    DM:  - Reviewed A1c 10/2022 of 6.6%. Discussed this is a new diagnosis of diabetes  - No medications initiated at this time  - Counseled on diabetic diet and routine physical activity  - Discussed the need for yearly diabetic eye exams and foot checks  - Glucometer ordered. Reviewed signs and symptoms of hypoglycemia, how to correct, and specific hypoglycemic readings  - I recommend referral to diabetes education and he is agreeable. Referral placed today. - Repeat A1c 3 months    Hyperlipidemia:  - Reviewed lipid panel 10/2022, improving  - Continue atorvastatin 80 mg daily  - Counseled on low-cholesterol diet, weight loss, routine physical activity    Elevated liver enzymes:  - Patient does have positive history of fatty liver disease.  - He does note variable alcohol intake.   Discussed alcohol cessation  - Discussed importance of weight loss, tight control over DM, hypertension, and cholesterol  - Repeat LFTs 3 months    Clinton Roth, was evaluated through a synchronous (real-time) audio-video encounter. The patient (or guardian if applicable) is aware that this is a billable service, which includes applicable co-pays. This Virtual Visit was conducted with patient's (and/or legal guardian's) consent. The visit was conducted pursuant to the emergency declaration under the [address] [place] [address], [address] [place] [address] authority and the [facility] General Act. Patient identification was verified, and a caregiver was present when appropriate.       --Mickey Solomon, DAVID - CNP

## 2022-11-02 PROBLEM — E11.9 TYPE 2 DIABETES MELLITUS WITHOUT COMPLICATION, WITHOUT LONG-TERM CURRENT USE OF INSULIN (HCC): Status: ACTIVE | Noted: 2022-11-02

## 2022-11-08 ENCOUNTER — HOSPITAL ENCOUNTER (OUTPATIENT)
Dept: DIABETES SERVICES | Age: 47
Setting detail: THERAPIES SERIES
Discharge: HOME | End: 2022-11-08
Payer: COMMERCIAL

## 2022-11-08 PROCEDURE — G0108 DIAB MANAGE TRN  PER INDIV: HCPCS

## 2022-11-08 SDOH — ECONOMIC STABILITY: FOOD INSECURITY: ADDITIONAL INFORMATION: NO

## 2022-11-08 ASSESSMENT — PROBLEM AREAS IN DIABETES QUESTIONNAIRE (PAID)
PAID-5 TOTAL SCORE: 1
WORRYING ABOUT THE FUTURE AND THE POSSIBILITY OF SERIOUS COMPLICATIONS: 1
FEELING DEPRESSED WHEN YOU THINK ABOUT LIVING WITH DIABETES: 0
FEELING THAT DIABETES IS TAKING UP TOO MUCH OF YOUR MENTAL AND PHYSICAL ENERGY EVERY DAY: 0
COPING WITH COMPLICATIONS OF DIABETES: 0
FEELING SCARED WHEN YOU THINK ABOUT LIVING WITH DIABETES: 0

## 2022-11-08 ASSESSMENT — SLEEP AND FATIGUE QUESTIONNAIRES
HAVE YOU EVER BEEN TESTED FOR SLEEP APNEA: NO
HOW MANY HOURS OF SLEEP ARE YOU GETTING, ON AVERAGE: 7 OR MORE
HOW DO YOU RATE THE QUALITY OF YOUR SLEEP: GOOD
HAVE YOU BEEN TOLD, OR NOTICED ON YOUR OWN, THAT YOU STOP BREATHING OR STRUGGLE TO BREATHE IN YOUR SLEEP: NO

## 2022-11-08 ASSESSMENT — PATIENT HEALTH QUESTIONNAIRE - PHQ9
2. FEELING DOWN, DEPRESSED OR HOPELESS: 0
SUM OF ALL RESPONSES TO PHQ QUESTIONS 1-9: 0
SUM OF ALL RESPONSES TO PHQ QUESTIONS 1-9: 0
SUM OF ALL RESPONSES TO PHQ9 QUESTIONS 1 & 2: 0
1. LITTLE INTEREST OR PLEASURE IN DOING THINGS: 0
SUM OF ALL RESPONSES TO PHQ QUESTIONS 1-9: 0
SUM OF ALL RESPONSES TO PHQ QUESTIONS 1-9: 0

## 2022-11-08 NOTE — PROGRESS NOTES
Diabetes Self-Management Education Record    Progress Note:  Patient arrived to appointment by self for initial assessment for diabetes education for a new diagnosis of Type 2 diabetes. He was diagnosed a few weeks ago with an A1C of 6.6 on 10/18/22 with previous A1C in the last year of 6 and 6.3. Mother and Father both diagnosed with Type 2 diabetes. Medical history includes hyperlipidemia, HTN, and fatty liver disease. Currently on statin and antihypertensives. Seen dietitian about 3-4 months ago for cholesterol education. Reports history of \"digestive issues\" which he describes as IBS and diarrhea. Attempts to increase his fiber which does seem to help. Likes fruits and vegetables. Per food recall he admits to eating out frequently, eating foods from vending machine, pizza and beer, and generally \"unhealthy diet with large portions. \" He says he has gained about 20-25 lbs but since diagnosis has dropped some weight and is now 260 was 272. He did this by doing intermittent fasting starting to eat at 10 am and nothing after 6 pm (16 hour fast). He questions if this is the best diet for diabetes. Started using exercise bike for 20 minutes daily and knows exercise needs to be increased. Eye exam was over one year ago, dental exam over 5 years and has not had a foot exam. He is instructed to schedule dental and eye exam and encourage foot exam at next visit (last visit telehealth to review and inform of diagnosis). He brings unopened glucometer and supplies for instruction. Hopes to control this with diet and exercise. Currently not on medication for diabetes. Begin by addressing his questions regarding diet. Encourage him to consider if he can sustain this eating pattern long term as changes he makes need to be supported long term to manage his diabetes. Discuss the need for portion control even when doing fasting. Educated on carbohydrates and impact on glucose and appropriate serving sizes.  Given My Carbohydrate Guide, Diabetes Nutrition Placemat, weekly sample meal plan, and list of healthy snacks containing carb and protein. Discuss benefits of high fiber foods as well. Encourage consistent carb intake spaced evenly throughout the day. Instructed on glucometer use and he was able to perform a glucose test without difficulty. His result at 6:30 pm is 112 which is approximately 2 and 1/2 hours after having 3 small pieces of frozen pizza. Given handout on glucose range for diabetes and prediabetes. Encourage him to check daily while he is making changes and to evaluate his changes to see if this is working. Encourage to vary testing times some days fasting and then some 2 hours post prandial to see the effects of food choices and amounts. Instructed on safe needle disposal.  Encourage exercise be continued and increased as able. Praised for his efforts. He is interested in returning for additional education. He does not have his work schedule but would like to return in about one month. He thao call educator when he has next work schedule. All questions are answered and he is encouraged to call with questions or concerns.     Participant Name: Eli Merino   Referring Provider:  DAVID Diaz CNP    Keys to learning:  Considerations: []Language []Emotional []Health Literacy  []Cognitive []Memory changes []Financial []Cultural   []Gnosticism []Vision []Hearing  []Speech []Lack of desire  []Literacy  []Psycho-social  [x]None [x] Covid-19 group restrictions  If considerations are noted, accommodations made:     Identified barriers to learning/self management: lives alone, works long hours    The following information was discussed:    [x] Diabetes disease process and treatment options   [x] Healthy nutrition, carbohydrate counting, meal planning  [x] Monitoring blood glucose and other parameters; interpreting and using results  [] Acute complications--prevention, detection and treatment  [] Medication management confidence to master goal this visit:   [] Excellent  [x] Good  [] Fair  [] Poor            Current main goal attainment frequency:   [] Never  [] Some  [x] Half  [] Most  [] All    Participant confidence to master goal this visit:   [x] Excellent  [] Good [] Fair  [] Poor                  Session  Topics & Learning Objectives:      Comments:   Diabetes disease process & Treatment process: Define diabetes & prediabetes; identify own type of diabetes; role of the pancreas; signs/symptoms; diagnostic criteria; prevention and treatment options; contributing factors. Rating  [] 1  [x] 2  [] 3  [] 4      [] NC  [] N/A   Rating  [] 1  [] 2  [] 3  [] 4  [] NC  [] N/A     Rating  [] 1  [] 2  [] 3  [] 4  [] NC  [] N/A     Rating  [] 1  [] 2  [] 3  [] 4  [] NC  [] N/A           Incorporating nutritional management into lifestyle: Describe effect of type, amount & timing of food on blood glucose; Describe basic meal planning techniques & current nutrition guidelines; Correctly read food labels & demonstrate CHO counting & portion control with personalized meal plan. Rating  [] 1  [x] 2  [] 3  [] 4  [] NC  [] N/A     Rating  [] 1  [] 2  [] 3  [] 4  [] NC  [] N/A     Rating  [] 1  [] 2  [] 3  [] 4  [] NC  [] N/A     Rating  [] 1  [] 2  [] 3  [] 4  [] NC  [] N/A                 Incorporating physical activity into lifestyle:   State effect of exercise on blood glucose levels. Identifies personal exercise plan and contraindications. Discussed safety tips while exercising. Rating  [] 1  [] 2  [x] 3  [] 4  [] NC  [] N/A     Rating  [] 1  [] 2  [] 3  [] 4  [] NC  [] N/A       Rating  [] 1  [] 2  [] 3  [] 4  [] NC  [] N/A     Rating  [] 1  [] 2  [] 3  [] 4  [] NC  [] N/A           Using medications safely: State effect of diabetes medicines on diabetes;   Name diabetes medication taking, action, timing & side effects.    Rating  [] 1  [] 2  [] 3  [] 4  [] NC  [x] N/A       Rating  [] 1  [] 2  [] 3  [] 4  [] NC  [] N/A         Rating  [] 1  [] 2  [] 3  [] 4  [] NC  [] N/A   Rating  [] 1  [] 2  [] 3  [] 4  [] NC  [] N/A      ________             Insulin/injectable-  Appropriate injection site; proper storage; proper technique; safe needle disposal.   Rating  [] 1  [] 2  [] 3  [] 4  [] NC  [x] N/A Rating  [] 1  [] 2  [] 3  [] 4  [] NC  [] N/A Rating  [] 1  [] 2  [] 3  [] 4  [] NC  [] N/A Rating  [] 1  [] 2  [] 3  [] 4  [] NC  [] N/A        Monitoring blood glucose, interpreting and using results: Identify recommended blood glucose targets, personal targets, A1C target, importance of logging glucose,appropriate techniques and problem solving. Safe lancet disposal.    Rating  [] 1  [x] 2  [] 3  [] 4  [] NC  [] N/A     Rating  [] 1  [] 2  [] 3  [] 4  [] NC  [] N/A       Rating  [] 1  [] 2  [] 3  [] 4  [] NC  [] N/A     Rating  [] 1  [] 2  [] 3  [] 4  [] NC  [] N/A         Prevention, detection & treatment of acute complications: List symptoms of hyper- and hypoglycemia; Describe how to treat low blood sugar & actions for lowering high blood glucose levels. Prevention and treatment strategies. Rating  [] 1  [x] 2  [] 3  [] 4  [] NC  [] N/A   Rating  [] 1  [] 2  [] 3  [] 4  [] NC  [] N/A   Rating  [] 1  [] 2  [] 3  [] 4  [] NC  [] N/A Rating  [] 1  [] 2  [] 3  [] 4  [] NC  [] N/A                   Describe sick day guidelines and indications for physician notification. Rating  [] 1  [x] 2  [] 3  [] 4  [] NC  [] N/A     Rating  [] 1  [] 2  [] 3  [] 4  [] NC  [] N/A     Rating  [] 1  [] 2  [] 3  [] 4  [] NC  [] N/A     Rating  [] 1  [] 2  [] 3  [] 4  [] NC  [] N/A        Prevention, detection & treatment of chronic complications: Define the natural course of diabetes & describe the relationship of blood glucose levels to long term complications of diabetes. Identify preventative measures and standard of care.      Rating  [] 1  [x] 2  [] 3  [] 4  [] NC  [] N/A     Rating  [] 1  [] 2  [] 3  [] 4  [] NC  [] N/A Rating  [] 1  [] 2  [] 3  [] 4  [] NC  [] N/A     Rating  [] 1  [] 2  [] 3  [] 4  [] NC  [] N/A      Developing strategies to address psychosocial issues: Describe feelings about living with diabetes; Identify support needed & support network. Describe how stress, depression, and anxiety affect blood glucose. Identify coping strategies. Recognize diabetes distress and be able to identify support options. Rating  [] 1  [x] 2  [] 3  [] 4  [] NC  [] N/A       Rating  [] 1  [] 2  [] 3  [] 4  [] NC  [] N/A     Rating  [] 1  [] 2  [] 3  [] 4  [] NC  [] N/A     Rating  [] 1  [] 2  [] 3  [] 4  [] NC  [] N/A          Developing strategies to promote health/change behavior:  Define the ABCs of diabetes; Identify appropriate screenings, schedule & personal plan for screenings. Identify 7 self-care behaviors. Benefits, challenges and strategies for behavioral change. Rating  [] 1  [x] 2  [] 3  [] 4  [] NC  [] N/A     Rating  [] 1  [] 2  [] 3  [] 4  [] NC  [] N/A     Rating  [] 1  [] 2  [] 3  [] 4  [] NC  [] N/A     Rating  [] 1  [] 2  [] 3  [] 4  [] NC  [] N/A             Time spent with patient: 80 minutes    Next Appointment: will call around 1 month     Instruction Method: [x]Lecture/Discussion  []Power Point Presentation  [x]Handouts  [x]Return Demonstration     Education Materials/Equipment Provided:      [x]Self-Management - Initial assessment - ADA  Where do I Begin? Living with Type 2 diabetes\" booklet;  Diet meal planning basics, handout on diabetes education classes, hyper/hypoglycemia signs/symptoms and treatment handout; Diabetes zones; Support plan resource list.      []Self-Management  Class 1 - Diabetes: Your Take Control Guide\" booklet. Healthy Interactions [facility] \"On The Road To Better Managing Your Diabetes\".      [] Self-Management  Class 2 -  \"Traveling with Diabetes\", Dining Out With Diabetes, \"Coping with Diabetes\", \"Diabetes Disaster Planning\", \"Know Your Numbers/Diabetes Care Checklist\", High Blood Sugar, Low Blood Sugar. Healthy Interactions Map \"Monitoring Your Blood Glucose. \"     [] Self-Management  Class 3 - \"Risk Factors for CVD\", foot care tips sheet and \"How to pick the right shoe\", \"Medications Used To Treat Diabetes\", How To Care For Your Teeth and Gums\", \"Vaccinations For Adults with Diabetes\", \"Type 2 diabetes and the role of GLP-1\". Healthy Interactions Map \"Continuing Your Journey\". []Self-Management - 3 month follow-up      []Glucose Meter      []Insulin Kit      []Other        Handouts/Booklets given:     [] How To Thrive: A Guide for Your Journey with Diabetes    [] Daily Diabetic Meal Planning Guide   [] Nutrition in the [place] [address]    [] Resources for People With Diabetes   [] Other       Diabetes Self Management Support Plan: To assist and support your continued progress in managing your diabetes following education-    (  X)  Gym or exercise program of your choice. (Suggestions:  [employer], Circuit training, any exercise facility and your local Physical Therapy or Cardiac Rehabilitation exercise Program)      (  )   Library    (  )    ADA website:  BrowserReRevokom.ca. org    (  )   Http: DotProtection.gl    (  )   Diabetes Forecast [place] you may get this information on the ADA web site.     (  )  Diabetes Interview - [place]   [phone]    (  )  Diabetes Self Management (bi-monthly magazine)  [phone]    (  ) Support group: (  ) Support group: [place] first Tuesday of the month at 9 am and [place] third Wednesday of the month at 9 am    (  )  Health Journeys [employer]  (relaxation tapes for people with Diabetes)  [phone]    ( X )  Your suggestions:   Eye exam, dental exam                Alexus Do RN, BSN, Kira Loya

## 2022-12-05 RX ORDER — ATORVASTATIN CALCIUM 80 MG/1
80 TABLET, FILM COATED ORAL DAILY
Qty: 90 TABLET | Refills: 3 | Status: SHIPPED | OUTPATIENT
Start: 2022-12-05

## 2023-02-08 ENCOUNTER — TELEPHONE (OUTPATIENT)
Dept: GASTROENTEROLOGY | Age: 48
End: 2023-02-08

## 2023-02-08 NOTE — TELEPHONE ENCOUNTER
Called patient and informed of need for a cardiac clearance before proceeding with colonoscopy/egd. Transferred call to Dr Mallory Toscano office for appointment and to discuss what is needed. Patient called back and has appointment on 2/21 and therefore needs to cancel colonoscopy/egd for 2/22. Informed will call patient back to figure out a reschedule date. Patient verbalized understanding.

## 2023-02-08 NOTE — TELEPHONE ENCOUNTER
Contacted patient and offered 5/24/23 - patient agrees and states will make it work.      Surgery notified of change

## 2023-02-20 ENCOUNTER — OFFICE VISIT (OUTPATIENT)
Dept: PRIMARY CARE CLINIC | Age: 48
End: 2023-02-20

## 2023-02-20 VITALS
TEMPERATURE: 97.9 F | BODY MASS INDEX: 36.7 KG/M2 | WEIGHT: 271 LBS | SYSTOLIC BLOOD PRESSURE: 126 MMHG | OXYGEN SATURATION: 98 % | DIASTOLIC BLOOD PRESSURE: 88 MMHG | HEART RATE: 83 BPM | RESPIRATION RATE: 18 BRPM | HEIGHT: 72 IN

## 2023-02-20 DIAGNOSIS — F31.81 BIPOLAR 2 DISORDER (HCC): ICD-10-CM

## 2023-02-20 DIAGNOSIS — Z00.00 WELLNESS EXAMINATION: ICD-10-CM

## 2023-02-20 DIAGNOSIS — E11.9 TYPE 2 DIABETES MELLITUS WITHOUT COMPLICATION, WITHOUT LONG-TERM CURRENT USE OF INSULIN (HCC): Primary | ICD-10-CM

## 2023-02-20 DIAGNOSIS — E78.2 MIXED HYPERLIPIDEMIA: ICD-10-CM

## 2023-02-20 DIAGNOSIS — I10 PRIMARY HYPERTENSION: ICD-10-CM

## 2023-02-20 SDOH — ECONOMIC STABILITY: FOOD INSECURITY: WITHIN THE PAST 12 MONTHS, THE FOOD YOU BOUGHT JUST DIDN'T LAST AND YOU DIDN'T HAVE MONEY TO GET MORE.: NEVER TRUE

## 2023-02-20 SDOH — ECONOMIC STABILITY: INCOME INSECURITY: HOW HARD IS IT FOR YOU TO PAY FOR THE VERY BASICS LIKE FOOD, HOUSING, MEDICAL CARE, AND HEATING?: NOT HARD AT ALL

## 2023-02-20 SDOH — ECONOMIC STABILITY: HOUSING INSECURITY
IN THE LAST 12 MONTHS, WAS THERE A TIME WHEN YOU DID NOT HAVE A STEADY PLACE TO SLEEP OR SLEPT IN A SHELTER (INCLUDING NOW)?: NO

## 2023-02-20 SDOH — ECONOMIC STABILITY: FOOD INSECURITY: WITHIN THE PAST 12 MONTHS, YOU WORRIED THAT YOUR FOOD WOULD RUN OUT BEFORE YOU GOT MONEY TO BUY MORE.: NEVER TRUE

## 2023-02-20 ASSESSMENT — PATIENT HEALTH QUESTIONNAIRE - PHQ9
SUM OF ALL RESPONSES TO PHQ QUESTIONS 1-9: 0
6. FEELING BAD ABOUT YOURSELF - OR THAT YOU ARE A FAILURE OR HAVE LET YOURSELF OR YOUR FAMILY DOWN: 0
3. TROUBLE FALLING OR STAYING ASLEEP: 0
9. THOUGHTS THAT YOU WOULD BE BETTER OFF DEAD, OR OF HURTING YOURSELF: 0
5. POOR APPETITE OR OVEREATING: 0
1. LITTLE INTEREST OR PLEASURE IN DOING THINGS: 0
2. FEELING DOWN, DEPRESSED OR HOPELESS: 0
7. TROUBLE CONCENTRATING ON THINGS, SUCH AS READING THE NEWSPAPER OR WATCHING TELEVISION: 0
8. MOVING OR SPEAKING SO SLOWLY THAT OTHER PEOPLE COULD HAVE NOTICED. OR THE OPPOSITE, BEING SO FIGETY OR RESTLESS THAT YOU HAVE BEEN MOVING AROUND A LOT MORE THAN USUAL: 0
SUM OF ALL RESPONSES TO PHQ QUESTIONS 1-9: 0
SUM OF ALL RESPONSES TO PHQ QUESTIONS 1-9: 0
SUM OF ALL RESPONSES TO PHQ9 QUESTIONS 1 & 2: 0
4. FEELING TIRED OR HAVING LITTLE ENERGY: 0
SUM OF ALL RESPONSES TO PHQ QUESTIONS 1-9: 0
10. IF YOU CHECKED OFF ANY PROBLEMS, HOW DIFFICULT HAVE THESE PROBLEMS MADE IT FOR YOU TO DO YOUR WORK, TAKE CARE OF THINGS AT HOME, OR GET ALONG WITH OTHER PEOPLE: 0

## 2023-02-20 ASSESSMENT — ENCOUNTER SYMPTOMS: SHORTNESS OF BREATH: 0

## 2023-02-20 NOTE — PATIENT INSTRUCTIONS
SURVEY:    You may be receiving a survey from [employer] regarding your visit today. Please complete the survey to enable us to provide the highest quality of care to you and your family. If you cannot score us a very good on any question, please call the office to discuss how we could of made your experience a very good one. Thank you.

## 2023-02-20 NOTE — PROGRESS NOTES
Name: Umair Godinez  : 1975         Chief Complaint:     Chief Complaint   Patient presents with    Hypertension     Current medication regimen includes amlodipine 5mg QD and lisinopril 40mg QD. He denies monitoring his blood pressure at home. At-home blood pressure is averaging N/A. He denies a well balanced diet. He admits following a low-sodium diet. For physical activity, he notes none. He denies chest pain, shortness of breath, headache, vision changes, palpitations, lightheadedness, or dizziness. Hyperlipidemia     Current treatment includes atorvastatin 80mg QD. Denies side effects with this medication. He admits daily medication compliance. History of Present Illness:      Umair Godinez is a 49 y.o.  male who presents with Hypertension (Current medication regimen includes amlodipine 5mg QD and lisinopril 40mg QD. He denies monitoring his blood pressure at home. At-home blood pressure is averaging N/A. He denies a well balanced diet. He admits following a low-sodium diet. For physical activity, he notes none. He denies chest pain, shortness of breath, headache, vision changes, palpitations, lightheadedness, or dizziness. ) and Hyperlipidemia (Current treatment includes atorvastatin 80mg QD. Denies side effects with this medication. He admits daily medication compliance. )      HPI    Psych:  Admits history of bipolar 2. He was seeing Dr. Brian Cheung in [place] who manages lamictal and latuda. He states Dr. Brian Cheung is no longer seeing patients and patient is needing to establish with new psychiatrist. Mood is stable overall. He is scheduled with [facility] next week. Hypertension:  Current medication regimen includes amlodipine 5mg QD and lisinopril 40mg QD. He denies monitoring his blood pressure at home. At-home blood pressure is averaging N/A. He denies a well balanced diet. He admits following a low-sodium diet. For physical activity, he notes none.  He denies chest pain, shortness of breath, headache, vision changes, palpitations, lightheadedness, or dizziness. Hyperlipidemia:  Current treatment includes atorvastatin 80mg QD. He denies side effects with this medication. He admits daily medication compliance. Past Medical History:     Past Medical History:   Diagnosis Date    IBS (irritable bowel syndrome)     Mixed hyperlipidemia     Primary hypertension       Reviewed all health maintenance requirements and ordered appropriate tests  Health Maintenance Due   Topic Date Due    COVID-19 Vaccine (1) Never done    Pneumococcal 0-64 years Vaccine (1 - PCV) Never done    Diabetic Alb to Cr ratio (uACR) test  Never done    Diabetic retinal exam  Never done    Hepatitis B vaccine (1 of 3 - Risk 3-dose series) Never done    DTaP/Tdap/Td vaccine (1 - Tdap) Never done    Colorectal Cancer Screen  Never done    Flu vaccine (1) 08/01/2022       Past Surgical History:     Past Surgical History:   Procedure Laterality Date    COLONOSCOPY      HERNIA REPAIR          Medications:       Prior to Admission medications    Medication Sig Start Date End Date Taking? Authorizing Provider   atorvastatin (LIPITOR) 80 MG tablet Take 1 tablet by mouth daily  Patient taking differently: Take 80 mg by mouth Daily with supper 12/5/22  Yes DAVID Cuellar CNP   blood glucose monitor kit and supplies Dispense sufficient amount for indicated testing frequency plus additional to accommodate PRN testing needs. Dispense all needed supplies to include: monitor, strips, lancing device, lancets, control solutions, alcohol swabs.  11/2/22  Yes DAVID Cuellar CNP   dicyclomine (BENTYL) 10 MG capsule Take 1 capsule by mouth 4 times daily 10/31/22  Yes Amairani Jennings MD   lisinopril (PRINIVIL;ZESTRIL) 40 MG tablet Take 1 tablet by mouth daily  Patient taking differently: Take 40 mg by mouth Daily with supper 10/18/22  Yes DAVID Cuellar CNP   lamoTRIgine (LAMICTAL) 200 MG tablet Take 1 tablet by mouth daily  Patient taking differently: Take 200 mg by mouth Daily with supper 10/4/22  Yes DAVID Jaramillo CNP   amLODIPine (NORVASC) 5 MG tablet take 1 tablet by mouth once daily  Patient taking differently: Daily with supper 8/4/22  Yes DAVID Jaramillo CNP   lurasidone (LATUDA) 20 MG TABS tablet Take 20 mg by mouth Daily with supper 9/10/21  Yes Historical Provider, MD        Allergies:       Patient has no known allergies. Social History:     Tobacco:    reports that he has quit smoking. He quit smokeless tobacco use about 3 years ago. His smokeless tobacco use included chew. Alcohol:      reports current alcohol use. Drug Use:  reports that he does not currently use drugs after having used the following drugs: Marijuana Garbilly Tavarez). Family History:     Family History   Problem Relation Age of Onset    Diabetes Mother     Coronary Art Dis Mother     COPD Mother     Congenital Heart Disease Father     Coronary Art Dis Father     Irritable Bowel Syndrome Father     Irritable Bowel Syndrome Paternal Grandfather     Colon Cancer Paternal Grandfather        Review of Systems:     Positive and Negative as described in HPI    Review of Systems   Respiratory:  Negative for shortness of breath. Cardiovascular:  Negative for chest pain and palpitations. Neurological:  Negative for dizziness and light-headedness. Physical Exam:   Vitals:  /88   Pulse 83   Temp 97.9 °F (36.6 °C)   Resp 18   Ht 6' (1.829 m)   Wt 271 lb (122.9 kg)   SpO2 98%   BMI 36.75 kg/m²     Physical Exam  Constitutional:       General: He is not in acute distress. Appearance: Normal appearance. He is obese. He is not ill-appearing or toxic-appearing. Cardiovascular:      Rate and Rhythm: Normal rate and regular rhythm. Pulses:           Dorsalis pedis pulses are 2+ on the right side and 2+ on the left side.         Posterior tibial pulses are 2+ on the right side and 2+ on the left side. Heart sounds: Normal heart sounds. No murmur heard. Pulmonary:      Effort: Pulmonary effort is normal. No respiratory distress. Breath sounds: Normal breath sounds. No stridor. No wheezing, rhonchi or rales. Feet:      Right foot:      Protective Sensation: 6 sites tested. 6 sites sensed. Skin integrity: No ulcer, blister, skin breakdown, erythema, warmth, callus, dry skin or fissure. Toenail Condition: Right toenails are normal.      Left foot:      Protective Sensation: 6 sites tested. 6 sites sensed. Skin integrity: No ulcer, blister, skin breakdown, erythema, warmth, callus, dry skin or fissure. Toenail Condition: Left toenails are normal.      Comments: White maceration between 4-5th toes. WNL monofilament and vibratory sense bilaterally. Neurological:      Mental Status: He is alert. Psychiatric:         Mood and Affect: Mood normal.         Behavior: Behavior normal.         Thought Content:  Thought content normal.         Judgment: Judgment normal.       Data:     Lab Results   Component Value Date/Time     02/21/2023 08:50 AM    K 4.3 02/21/2023 08:50 AM     02/21/2023 08:50 AM    CO2 26 02/21/2023 08:50 AM    BUN 13 02/21/2023 08:50 AM    CREATININE 0.72 02/21/2023 08:50 AM    GLUCOSE 133 02/21/2023 08:50 AM    PROT 7.4 02/21/2023 08:50 AM    LABALBU 4.1 02/21/2023 08:50 AM    BILITOT 0.3 02/21/2023 08:50 AM    ALKPHOS 52 02/21/2023 08:50 AM    AST 32 02/21/2023 08:50 AM    ALT 53 02/21/2023 08:50 AM     Lab Results   Component Value Date/Time    WBC 9.1 02/21/2023 08:50 AM    RBC 4.82 02/21/2023 08:50 AM    HGB 15.3 02/21/2023 08:50 AM    HCT 43.1 02/21/2023 08:50 AM    MCV 89.4 02/21/2023 08:50 AM    MCH 31.7 02/21/2023 08:50 AM    MCHC 35.5 02/21/2023 08:50 AM    RDW 12.1 02/21/2023 08:50 AM     02/21/2023 08:50 AM    MPV 10.7 02/21/2023 08:50 AM     Lab Results   Component Value Date/Time    TSH 3.22 11/09/2021 09:50 AM     Lab Results   Component Value Date/Time    CHOL 152 10/18/2022 10:26 AM    HDL 36 10/18/2022 10:26 AM    PSA 0.33 11/09/2021 09:50 AM    LABA1C 6.6 10/18/2022 10:26 AM       Assessment/Plan:      Diagnosis Orders   1. Type 2 diabetes mellitus without complication, without long-term current use of insulin (HCC)  Diabetic Foot Exam    Microalbumin / Creatinine Urine Ratio    Hemoglobin A1C      2. Wellness examination  Hemoglobin A1C    Comprehensive Metabolic Panel    CBC    Lipid Panel      3. Primary hypertension  Comprehensive Metabolic Panel    CBC      4. Mixed hyperlipidemia  Lipid Panel      5. Bipolar 2 disorder (HCC)            Bipolar 2:  - Stable on latuda and lamictal   - Was previously seeing Dr. Katharine De Luna, who is no longer seeing patients in the outpatient setting per patient  - He is scheduled to see a new psychiatrist through [facility] in [place]. Upcoming appointment next week. - Discussed the importance of continuity of care and not going without medication     Hypertension:  - Stable on lisinopril 40 mg daily and amlodipine 5 mg daily    Hyperlipidemia:  - Current treatment includes atorvastatin 80 mg daily  - Continue following with dietitian  - Patient was to complete lab work prior to today's visit, he did not. - Complete lipid panel today    DM:  - Foot exam WNL aside from tinea pedis. Encourage OTC antifungal treatments. Notify office if symptoms worsen or persist  - Patient was to repeat A1c prior to today's appointment, he did not complete.   Encouraged him to complete this soon as possible  - Urine microalbumin creatinine ratio ordered  - Continue following with dietitian    Completed Refills   Requested Prescriptions      No prescriptions requested or ordered in this encounter       Orders Placed This Encounter   Procedures    Microalbumin / Creatinine Urine Ratio     Standing Status:   Future     Number of Occurrences:   1     Standing Expiration Date:   2/20/2024    Hemoglobin A1C Standing Status:   Future     Standing Expiration Date:   2/20/2024    Comprehensive Metabolic Panel     Standing Status:   Future     Standing Expiration Date:   2/20/2024    CBC     Standing Status:   Future     Standing Expiration Date:   2/20/2024    Lipid Panel     Standing Status:   Future     Standing Expiration Date:   2/20/2024    Diabetic Foot Exam        No results found for this visit on 02/20/23. Return in about 3 months (around 5/20/2023), or if symptoms worsen or fail to improve, for wellness.     Electronically signed by DAVID Jack CNP on 02/21/23 at 8:03 PM.

## 2023-02-21 ENCOUNTER — HOSPITAL ENCOUNTER (OUTPATIENT)
Age: 48
Discharge: HOME | End: 2023-02-21
Payer: COMMERCIAL

## 2023-02-21 ENCOUNTER — OFFICE VISIT (OUTPATIENT)
Dept: CARDIOLOGY | Age: 48
End: 2023-02-21
Payer: COMMERCIAL

## 2023-02-21 VITALS
SYSTOLIC BLOOD PRESSURE: 130 MMHG | HEART RATE: 76 BPM | HEIGHT: 72 IN | WEIGHT: 267.6 LBS | BODY MASS INDEX: 36.24 KG/M2 | DIASTOLIC BLOOD PRESSURE: 80 MMHG | RESPIRATION RATE: 16 BRPM | OXYGEN SATURATION: 99 %

## 2023-02-21 DIAGNOSIS — I10 PRIMARY HYPERTENSION: ICD-10-CM

## 2023-02-21 DIAGNOSIS — R94.39 ABNORMAL STRESS TEST: ICD-10-CM

## 2023-02-21 DIAGNOSIS — E66.09 CLASS 2 OBESITY DUE TO EXCESS CALORIES WITH BODY MASS INDEX (BMI) OF 36.0 TO 36.9 IN ADULT, UNSPECIFIED WHETHER SERIOUS COMORBIDITY PRESENT: ICD-10-CM

## 2023-02-21 DIAGNOSIS — K76.0 FATTY LIVER DISEASE, NONALCOHOLIC: ICD-10-CM

## 2023-02-21 DIAGNOSIS — Z82.49 FAMILY HISTORY OF HEART DISEASE: ICD-10-CM

## 2023-02-21 DIAGNOSIS — Z01.810 PREOP CARDIOVASCULAR EXAM: Primary | ICD-10-CM

## 2023-02-21 DIAGNOSIS — Z12.5 PROSTATE CANCER SCREENING: ICD-10-CM

## 2023-02-21 DIAGNOSIS — R06.02 SOB (SHORTNESS OF BREATH): ICD-10-CM

## 2023-02-21 DIAGNOSIS — E78.2 MIXED HYPERLIPIDEMIA: ICD-10-CM

## 2023-02-21 DIAGNOSIS — E11.9 TYPE 2 DIABETES MELLITUS WITHOUT COMPLICATION, WITHOUT LONG-TERM CURRENT USE OF INSULIN (HCC): ICD-10-CM

## 2023-02-21 LAB
ALBUMIN SERPL-MCNC: 4.1 G/DL (ref 3.5–5.2)
ALBUMIN/GLOBULIN RATIO: 1.2 (ref 1–2.5)
ALP SERPL-CCNC: 52 U/L (ref 40–129)
ALT SERPL-CCNC: 53 U/L (ref 5–41)
ANION GAP SERPL CALCULATED.3IONS-SCNC: 11 MMOL/L (ref 9–17)
AST SERPL-CCNC: 32 U/L
BILIRUB SERPL-MCNC: 0.3 MG/DL (ref 0.3–1.2)
BUN SERPL-MCNC: 13 MG/DL (ref 6–20)
BUN/CREAT BLD: 18 (ref 9–20)
CALCIUM SERPL-MCNC: 8.9 MG/DL (ref 8.6–10.4)
CHLORIDE SERPL-SCNC: 100 MMOL/L (ref 98–107)
CO2 SERPL-SCNC: 26 MMOL/L (ref 20–31)
CREAT SERPL-MCNC: 0.72 MG/DL (ref 0.7–1.2)
CREATININE URINE: 107.9 MG/DL (ref 39–259)
EST. AVERAGE GLUCOSE BLD GHB EST-MCNC: 154 MG/DL
GFR SERPL CREATININE-BSD FRML MDRD: >60 ML/MIN/1.73M2
GLUCOSE SERPL-MCNC: 133 MG/DL (ref 70–99)
HBA1C MFR BLD: 7 % (ref 4–6)
HCT VFR BLD AUTO: 43.1 % (ref 40.7–50.3)
HGB BLD-MCNC: 15.3 G/DL (ref 13–17)
MCH RBC QN AUTO: 31.7 PG (ref 25.2–33.5)
MCHC RBC AUTO-ENTMCNC: 35.5 G/DL (ref 28.4–34.8)
MCV RBC AUTO: 89.4 FL (ref 82.6–102.9)
MICROALBUMIN/CREAT 24H UR: 26 MG/L
MICROALBUMIN/CREAT UR-RTO: 24 MCG/MG CREAT
NRBC AUTOMATED: 0 PER 100 WBC
PDW BLD-RTO: 12.1 % (ref 11.8–14.4)
PLATELET # BLD AUTO: 214 K/UL (ref 138–453)
PMV BLD AUTO: 10.7 FL (ref 8.1–13.5)
POTASSIUM SERPL-SCNC: 4.3 MMOL/L (ref 3.7–5.3)
PROSTATE SPECIFIC ANTIGEN: 0.28 NG/ML
PROT SERPL-MCNC: 7.4 G/DL (ref 6.4–8.3)
RBC # BLD: 4.82 M/UL (ref 4.21–5.77)
SODIUM SERPL-SCNC: 137 MMOL/L (ref 135–144)
WBC # BLD AUTO: 9.1 K/UL (ref 3.5–11.3)

## 2023-02-21 PROCEDURE — 80053 COMPREHEN METABOLIC PANEL: CPT

## 2023-02-21 PROCEDURE — 99214 OFFICE O/P EST MOD 30 MIN: CPT | Performed by: PHYSICIAN ASSISTANT

## 2023-02-21 PROCEDURE — 82043 UR ALBUMIN QUANTITATIVE: CPT

## 2023-02-21 PROCEDURE — 82570 ASSAY OF URINE CREATININE: CPT

## 2023-02-21 PROCEDURE — G8417 CALC BMI ABV UP PARAM F/U: HCPCS | Performed by: PHYSICIAN ASSISTANT

## 2023-02-21 PROCEDURE — 80061 LIPID PANEL: CPT

## 2023-02-21 PROCEDURE — 36415 COLL VENOUS BLD VENIPUNCTURE: CPT

## 2023-02-21 PROCEDURE — 83036 HEMOGLOBIN GLYCOSYLATED A1C: CPT

## 2023-02-21 PROCEDURE — 1036F TOBACCO NON-USER: CPT | Performed by: PHYSICIAN ASSISTANT

## 2023-02-21 PROCEDURE — 93000 ELECTROCARDIOGRAM COMPLETE: CPT | Performed by: FAMILY MEDICINE

## 2023-02-21 PROCEDURE — G0103 PSA SCREENING: HCPCS

## 2023-02-21 PROCEDURE — 3078F DIAST BP <80 MM HG: CPT | Performed by: PHYSICIAN ASSISTANT

## 2023-02-21 PROCEDURE — 85027 COMPLETE CBC AUTOMATED: CPT

## 2023-02-21 PROCEDURE — 3075F SYST BP GE 130 - 139MM HG: CPT | Performed by: PHYSICIAN ASSISTANT

## 2023-02-21 PROCEDURE — G8484 FLU IMMUNIZE NO ADMIN: HCPCS | Performed by: PHYSICIAN ASSISTANT

## 2023-02-21 PROCEDURE — G8427 DOCREV CUR MEDS BY ELIG CLIN: HCPCS | Performed by: PHYSICIAN ASSISTANT

## 2023-02-21 NOTE — PATIENT INSTRUCTIONS
SURVEY:    You may be receiving a survey from [employer] regarding your visit today. Please complete the survey to enable us to provide the highest quality of care to you and your family. If you cannot score us a very good on any question, please call the office to discuss how we could have made your experience a very good one. Thank you.

## 2023-02-21 NOTE — PROGRESS NOTES
Hannah Meyers am scribing for and in the presence of Lorraine Freeman PA-C. Patient: Corin Drivers  : 1975  Date of Visit: 2023    REASON FOR VISIT / CONSULTATION: Cardiac Clearance (Colonoscopy/EGD on 23 with Dr. Jian Bermudez. ) and Hypertension (Hx:HTN,HLD,SOB,Fam Hx,Tobacco Abuse. He has been doing fine. Denies: CP, SOB, Lightheaded/dizziness, Palpitations. )      History of Present Illness:        Dear Juan Manuel Sanchez APRN - CNP    I had the pleasure of seeing Corin Drivers in my office today. Mr. Monico Rodriguez is a 49 y.o. male with a history of difficult to control hypertension. He had stress test 18-20 years ago which led to pulmonology and that since has been resolved. He does have family history of heart disease in both parents. Treadmill stress test done on 2021 Duke treadmill score is 2 which correlates with intermediate risk. He smokes cigars, it varies but maybe about least twice a week, since college. EKG done today in office on 2023 showed normal sinus rhythm. Mr. Monico Rodriguez is here today for a follow up to receive cardiac clearance for an upcoming colonoscopy/EGD scheduled for May 24th 2023 with Dr. Jian Bermudez here at [facility] . He complains of shortness of breath (staying the same over the past year) but blames his weight on this. He reports that this is the biggest he's ever been. He had an intermediate exercise only stress test, therefore an echo and stress test was ordered but he never completed it due to cost.    He doesn't notice any leg swelling. He denies having any chest pain, pressure or tightness. He denies having any lightheaded/dizziness or palpitations. No cough, fever or chills. No nausea or vomiting. No abdominal pain, bleeding problems, bowel issues, problems with his medications or any other concerns at this time. Exercise Tolerance: Mr. Monico Rodriguez reports that he has a fairly good exercise tolerance.  His says that he could walk 1 mile without developing chest discomfort or significant shortness of breath. About a year and a half ago, he used to walk his dogs 3-5 miles a day but they have since passed. PAST MEDICAL HISTORY:        Past Medical History:   Diagnosis Date    IBS (irritable bowel syndrome)     Mixed hyperlipidemia     Primary hypertension        CURRENT ALLERGIES: Patient has no known allergies. REVIEW OF SYSTEMS: 14 systems were reviewed. Pertinent positives and negatives as above, all else negative. Past Surgical History:   Procedure Laterality Date    COLONOSCOPY      HERNIA REPAIR      Social History:  Social History     Tobacco Use    Smoking status: Former    Smokeless tobacco: Former     Types: Chew     Quit date: 2020    Tobacco comments:     Previously smoked cigars on occasion   Vaping Use    Vaping Use: Never used   Substance Use Topics    Alcohol use: Yes    Drug use: Not Currently     Types: Marijuana Garon Tavarez)        CURRENT MEDICATIONS:        Outpatient Medications Marked as Taking for the 2/21/23 encounter (Office Visit) with Vincent Alicia PA-C   Medication Sig Dispense Refill    atorvastatin (LIPITOR) 80 MG tablet Take 1 tablet by mouth daily (Patient taking differently: Take 80 mg by mouth Daily with supper) 90 tablet 3    blood glucose monitor kit and supplies Dispense sufficient amount for indicated testing frequency plus additional to accommodate PRN testing needs. Dispense all needed supplies to include: monitor, strips, lancing device, lancets, control solutions, alcohol swabs.  1 kit 0    dicyclomine (BENTYL) 10 MG capsule Take 1 capsule by mouth 4 times daily 120 capsule 3    lisinopril (PRINIVIL;ZESTRIL) 40 MG tablet Take 1 tablet by mouth daily (Patient taking differently: Take 40 mg by mouth Daily with supper) 90 tablet 3    lamoTRIgine (LAMICTAL) 200 MG tablet Take 1 tablet by mouth daily (Patient taking differently: Take 200 mg by mouth Daily with supper) 30 tablet 0    amLODIPine (NORVASC) 5 MG tablet take 1 tablet by mouth once daily (Patient taking differently: Daily with supper) 90 tablet 3    lurasidone (LATUDA) 20 MG TABS tablet Take 20 mg by mouth Daily with supper         FAMILY HISTORY: family history includes COPD in his mother; Colon Cancer in his paternal grandfather; Congenital Heart Disease in his father; Coronary Art Dis in his father and mother; Diabetes in his mother; Irritable Bowel Syndrome in his father and paternal grandfather. Physical Examination:      /80 (Site: Right Upper Arm, Position: Sitting, Cuff Size: Medium Adult)   Pulse 76   Resp 16   Ht 6' (1.829 m)   Wt 267 lb 9.6 oz (121.4 kg)   SpO2 99%   BMI 36.29 kg/m²  Body mass index is 36.29 kg/m². Constitutional: He is oriented to person. He appears well-developed and well-nourished. In no acute distress. HEENT: Normocephalic and atraumatic. No JVD present. Carotid bruit is not present. No mass and no thyromegaly present. No lymphadenopathy present. Cardiovascular: Normal rate, regular rhythm, normal heart sounds. Exam reveals no gallop and no friction rubs. 2/6 systolic murmur, 5th intercostal space on the LEFT in the mid-clavicular line (cardiac apex). Pulmonary/Chest: Effort normal and breath sounds normal. No respiratory distress. He has no wheezes, rhonchi or rales. Abdominal: Soft, non-tender. Bowel sounds and aorta are normal. He exhibits no organomegaly, mass or bruit. Extremities: Trace. No cyanosis or clubbing. 2+ radial and carotid pulses. Distal extremity pulses: 2+ bilaterally. Neurological: He is alert and oriented to person. No evidence of gross cranial nerve deficit. Coordination appeared normal.   Skin: Skin is warm and dry. There is no rash or diaphoresis. Psychiatric: He has a normal mood and affect.  His speech is normal and behavior is normal.      MOST RECENT LABS ON RECORD:   Lab Results   Component Value Date    WBC 9.7 10/18/2022    HGB 16.5 10/18/2022    HCT 47.3 10/18/2022  10/18/2022    CHOL 152 10/18/2022    TRIG 154 (H) 10/18/2022    HDL 36 (L) 10/18/2022    LDLDIRECT 194 (H) 05/31/2022    ALT 63 (H) 10/18/2022    AST 36 10/18/2022     10/18/2022    K 4.8 10/18/2022     10/18/2022    CREATININE 0.74 10/18/2022    BUN 11 10/18/2022    CO2 25 10/18/2022    TSH 3.22 11/09/2021    PSA 0.33 11/09/2021    LABA1C 6.6 (H) 10/18/2022       ASSESSMENT:     1. Preop cardiovascular exam    2. Primary hypertension    3. Mixed hyperlipidemia    4. SOB (shortness of breath)    5. Abnormal stress test    6. Family history of heart disease    7. Class 2 obesity due to excess calories with body mass index (BMI) of 36.0 to 36.9 in adult, unspecified whether serious comorbidity present       PLAN:        Pre-Op Clearance: Colonoscopy/EKG scheduled for 5/24/2023 with Dr. Jian Bermudez at [address] using 2014 ACC/AHA guidelines   Emergent procedure NO  Active Cardiac Condition NO (decompensated HF, Arrhythmia, MI <3 weeks, severe valve disease)  Risk Level of Procedure Low Risk (endoscopy, superficial skin, breast, ambulatory, or cataract, etc.)  Revised Cardiac Risk Index Risk factors: None  Measurement of Exercise Tolerance before Surgery >4 Yes  According to the 2014 ACC/AHA pre-operative risk assessment guidelines Corin Drivers is at low risk for major cardiac complications during a low risk procedure but has had an intermediate risk exercise only stress test and continued shortness of breath requiring further evaluation. Specific medication recommendations are listed below. Medications recommended to continue should be taken with a sip of water even when NPO. Medical management to reduce perioperative risk:  Anticoagulant Agent: Not applicable prior to the procedure. Anticoagulation Bridging: Not applicable  Additional Recommendations: I would also suggest that he continue his statin throughout the perioperative period.   Additional Testing List: I ordered a echocardiogram to better assess for the etiology of this problem and to help guide future management and Because of current signs and symptoms which can certainly be caused by significant coronary artery disease, I ordered a treadmill stress test with SPECT imaging to try and rule out this possibility. Essential Hypertension: Controlled  ACE Inibitor/ARB: Continue lisinopril 40 mg daily. Diuretics: Not indicated at this time. Calcium Channel Blocker: Continue amlodipine (Norvasc) 5 mg once daily. Beta Blocker: Not indicated at this time. Because of this history, I ordered a echocardiogram to better assess the severity of this problem. Hyperlipidemia: Mixed - Last LDL on 10/18/2022 was 85 mg/dL  Cholesterol Reduction Therapy: Continue Atorvastatin (Lipitor) 80 mg daily. Shortness of breath with mild-moderate exertion: Certainly could be ischemic and or cardiac especially given his abnormal treadmill stress test, family history and risk factors. I would like to have further ischemic work up done prior to clearing him for his procedure. He states it is staying about the same. Additional Testing List: I ordered a echocardiogram to better assess for the etiology of this problem and to help guide future management and Because of current signs and symptoms which can certainly be caused by significant coronary artery disease, I ordered a treadmill stress test with SPECT imaging to try and rule out this possibility. Family History of Heart Disease    Obesity: Body mass index is 36.29 kg/m². He is down 4 pounds since last October. I also briefly discussed both diet and exercise strategies for him to continue to loses weight and he was very receptive to this. In the meantime, I encouraged Mr. Lorri Nolasco to continue to take his other medications.      FOLLOW UP:   I told Mr. Lorri Nolasco to call my office if he had any problems, but otherwise I asked him to Return if symptoms worsen or fail to improve. However, I would be happy to see him sooner should the need arise. Sincerely,  Dana Wilkinson PA-C  [facility] Cardiology Specialist    [address], [place], [address] [place] [address]  Phone: [phone], Fax: [phone]     I believe that the risk of significant morbidity and mortality related to the patient's current medical conditions are: Intermediate. Approximately 25 minutes were spent during prep work, discussion and exam of the patient, and follow up documentation and all of their questions were answered. The documentation recorded by the scribe, accurately and completely reflects the services I personally performed and the decisions made by me.  Dana Wilkinson PA-C February 21, 2023

## 2023-02-22 LAB
CHOLEST SERPL-MCNC: 171 MG/DL
CHOLESTEROL/HDL RATIO: 4.8
HDLC SERPL-MCNC: 36 MG/DL
LDLC SERPL CALC-MCNC: 83 MG/DL (ref 0–130)
TRIGL SERPL-MCNC: 261 MG/DL

## 2023-02-22 RX ORDER — METFORMIN HYDROCHLORIDE 500 MG/1
500 TABLET, EXTENDED RELEASE ORAL
Qty: 30 TABLET | Refills: 5 | Status: SHIPPED | OUTPATIENT
Start: 2023-02-22

## 2023-03-17 ENCOUNTER — HOSPITAL ENCOUNTER (OUTPATIENT)
Dept: GENERAL RADIOLOGY | Age: 48
End: 2023-03-17
Payer: COMMERCIAL

## 2023-03-17 ENCOUNTER — OFFICE VISIT (OUTPATIENT)
Dept: PRIMARY CARE CLINIC | Age: 48
End: 2023-03-17
Payer: COMMERCIAL

## 2023-03-17 ENCOUNTER — HOSPITAL ENCOUNTER (OUTPATIENT)
Age: 48
Discharge: HOME | End: 2023-03-17
Payer: COMMERCIAL

## 2023-03-17 VITALS
BODY MASS INDEX: 35.89 KG/M2 | SYSTOLIC BLOOD PRESSURE: 124 MMHG | HEIGHT: 72 IN | DIASTOLIC BLOOD PRESSURE: 80 MMHG | WEIGHT: 265 LBS | OXYGEN SATURATION: 95 % | HEART RATE: 100 BPM

## 2023-03-17 DIAGNOSIS — R05.3 CHRONIC COUGH: ICD-10-CM

## 2023-03-17 DIAGNOSIS — J40 BRONCHITIS: Primary | ICD-10-CM

## 2023-03-17 DIAGNOSIS — J40 BRONCHITIS: ICD-10-CM

## 2023-03-17 LAB
ABSOLUTE EOS #: 0.14 K/UL (ref 0–0.44)
ABSOLUTE IMMATURE GRANULOCYTE: 0.07 K/UL (ref 0–0.3)
ABSOLUTE LYMPH #: 3.08 K/UL (ref 1.1–3.7)
ABSOLUTE MONO #: 1.45 K/UL (ref 0.1–1.2)
ALBUMIN SERPL-MCNC: 4.2 G/DL (ref 3.5–5.2)
ALBUMIN/GLOBULIN RATIO: 1.2 (ref 1–2.5)
ALP SERPL-CCNC: 67 U/L (ref 40–129)
ALT SERPL-CCNC: 45 U/L (ref 5–41)
ANION GAP SERPL CALCULATED.3IONS-SCNC: 15 MMOL/L (ref 9–17)
AST SERPL-CCNC: 24 U/L
BASOPHILS # BLD: 0 % (ref 0–2)
BASOPHILS ABSOLUTE: 0.06 K/UL (ref 0–0.2)
BILIRUB SERPL-MCNC: 0.4 MG/DL (ref 0.3–1.2)
BUN SERPL-MCNC: 9 MG/DL (ref 6–20)
BUN/CREAT BLD: 13 (ref 9–20)
CALCIUM SERPL-MCNC: 9.2 MG/DL (ref 8.6–10.4)
CHLORIDE SERPL-SCNC: 101 MMOL/L (ref 98–107)
CO2 SERPL-SCNC: 20 MMOL/L (ref 20–31)
CREAT SERPL-MCNC: 0.72 MG/DL (ref 0.7–1.2)
EOSINOPHILS RELATIVE PERCENT: 1 % (ref 1–4)
GFR SERPL CREATININE-BSD FRML MDRD: >60 ML/MIN/1.73M2
GLUCOSE SERPL-MCNC: 128 MG/DL (ref 70–99)
HCT VFR BLD AUTO: 45.7 % (ref 40.7–50.3)
HGB BLD-MCNC: 16 G/DL (ref 13–17)
IMMATURE GRANULOCYTES: 1 %
LYMPHOCYTES # BLD: 22 % (ref 24–43)
MCH RBC QN AUTO: 31.6 PG (ref 25.2–33.5)
MCHC RBC AUTO-ENTMCNC: 35 G/DL (ref 28.4–34.8)
MCV RBC AUTO: 90.3 FL (ref 82.6–102.9)
MONOCYTES # BLD: 11 % (ref 3–12)
NRBC AUTOMATED: 0 PER 100 WBC
PDW BLD-RTO: 12.2 % (ref 11.8–14.4)
PLATELET # BLD AUTO: 256 K/UL (ref 138–453)
PMV BLD AUTO: 10.2 FL (ref 8.1–13.5)
POTASSIUM SERPL-SCNC: 4.1 MMOL/L (ref 3.7–5.3)
PROT SERPL-MCNC: 7.7 G/DL (ref 6.4–8.3)
RBC # BLD: 5.06 M/UL (ref 4.21–5.77)
SEG NEUTROPHILS: 65 % (ref 36–65)
SEGMENTED NEUTROPHILS ABSOLUTE COUNT: 8.94 K/UL (ref 1.5–8.1)
SODIUM SERPL-SCNC: 136 MMOL/L (ref 135–144)
WBC # BLD AUTO: 13.7 K/UL (ref 3.5–11.3)

## 2023-03-17 PROCEDURE — G8484 FLU IMMUNIZE NO ADMIN: HCPCS | Performed by: STUDENT IN AN ORGANIZED HEALTH CARE EDUCATION/TRAINING PROGRAM

## 2023-03-17 PROCEDURE — 3079F DIAST BP 80-89 MM HG: CPT | Performed by: STUDENT IN AN ORGANIZED HEALTH CARE EDUCATION/TRAINING PROGRAM

## 2023-03-17 PROCEDURE — G8417 CALC BMI ABV UP PARAM F/U: HCPCS | Performed by: STUDENT IN AN ORGANIZED HEALTH CARE EDUCATION/TRAINING PROGRAM

## 2023-03-17 PROCEDURE — 3074F SYST BP LT 130 MM HG: CPT | Performed by: STUDENT IN AN ORGANIZED HEALTH CARE EDUCATION/TRAINING PROGRAM

## 2023-03-17 PROCEDURE — 1036F TOBACCO NON-USER: CPT | Performed by: STUDENT IN AN ORGANIZED HEALTH CARE EDUCATION/TRAINING PROGRAM

## 2023-03-17 PROCEDURE — 85025 COMPLETE CBC W/AUTO DIFF WBC: CPT

## 2023-03-17 PROCEDURE — 36415 COLL VENOUS BLD VENIPUNCTURE: CPT

## 2023-03-17 PROCEDURE — G8427 DOCREV CUR MEDS BY ELIG CLIN: HCPCS | Performed by: STUDENT IN AN ORGANIZED HEALTH CARE EDUCATION/TRAINING PROGRAM

## 2023-03-17 PROCEDURE — 71046 X-RAY EXAM CHEST 2 VIEWS: CPT

## 2023-03-17 PROCEDURE — 80053 COMPREHEN METABOLIC PANEL: CPT

## 2023-03-17 PROCEDURE — 99214 OFFICE O/P EST MOD 30 MIN: CPT | Performed by: STUDENT IN AN ORGANIZED HEALTH CARE EDUCATION/TRAINING PROGRAM

## 2023-03-17 RX ORDER — ALBUTEROL SULFATE 90 UG/1
2 AEROSOL, METERED RESPIRATORY (INHALATION) 4 TIMES DAILY PRN
Qty: 18 G | Refills: 0 | Status: SHIPPED | OUTPATIENT
Start: 2023-03-17

## 2023-03-17 RX ORDER — METHYLPREDNISOLONE 4 MG/1
TABLET ORAL
Qty: 1 KIT | Refills: 0 | Status: SHIPPED | OUTPATIENT
Start: 2023-03-17 | End: 2023-03-23

## 2023-03-17 RX ORDER — AZITHROMYCIN 250 MG/1
250 TABLET, FILM COATED ORAL SEE ADMIN INSTRUCTIONS
Qty: 6 TABLET | Refills: 0 | Status: SHIPPED | OUTPATIENT
Start: 2023-03-17 | End: 2023-03-22

## 2023-03-17 RX ORDER — ECHINACEA PURPUREA EXTRACT 125 MG
1 TABLET ORAL PRN
Qty: 1 EACH | Refills: 3 | Status: SHIPPED | OUTPATIENT
Start: 2023-03-17

## 2023-03-17 RX ORDER — FLUTICASONE PROPIONATE 50 MCG
2 SPRAY, SUSPENSION (ML) NASAL DAILY
Qty: 1 EACH | Refills: 0 | Status: SHIPPED | OUTPATIENT
Start: 2023-03-17

## 2023-03-17 RX ORDER — BENZONATATE 100 MG/1
100 CAPSULE ORAL 3 TIMES DAILY PRN
Qty: 30 CAPSULE | Refills: 0 | Status: SHIPPED | OUTPATIENT
Start: 2023-03-17 | End: 2023-03-27

## 2023-03-17 NOTE — PROGRESS NOTES
[address], [address] , [facility] 51 [facility] with  has a past medical history of IBS (irritable bowel syndrome), Mixed hyperlipidemia, and Primary hypertension. Presented to the office today for:  Chief Complaint   Patient presents with    Cough    Congestion       Assessment/Plan   1. Bronchitis  -     albuterol sulfate HFA (VENTOLIN HFA) 108 (90 Base) MCG/ACT inhaler; Inhale 2 puffs into the lungs 4 times daily as needed for Wheezing, Disp-18 g, R-0Normal  -     methylPREDNISolone (MEDROL DOSEPACK) 4 MG tablet; Take by mouth., Disp-1 kit, R-0Normal  -     azithromycin (ZITHROMAX) 250 MG tablet; Take 1 tablet by mouth See Admin Instructions for 5 days 500mg on day 1 followed by 250mg on days 2 - 5, Disp-6 tablet, R-0Normal  -     benzonatate (TESSALON) 100 MG capsule; Take 1 capsule by mouth 3 times daily as needed for Cough, Disp-30 capsule, R-0Normal  -     XR CHEST STANDARD (2 VW); Future  2. Chronic cough  -     XR CHEST STANDARD (2 VW); Future  -     CBC with Auto Differential; Future  -     Comprehensive Metabolic Panel; Future  -     sodium chloride (ALTAMIST SPRAY) 0.65 % nasal spray; 1 spray by Nasal route as needed for Congestion, Disp-1 each, R-3Normal  -     fluticasone (FLONASE) 50 MCG/ACT nasal spray; 2 sprays by Each Nostril route daily, Disp-1 each, R-0Normal    Return in about 2 weeks (around 3/31/2023) for F/U Cough. Symptomatic therapy suggested: push fluids, rest, gargle warm salt water, use vaporizer or mist prn and apply heat to sinuses prn. Nasal saline sprays PRN. Use Neti Pot PRN. Tylenol PRN for pain/fevers, Albuterol PRN for any shortness of breath/wheezing/mild dyspnea. Cepacol PRN for sore throat. May consider additional w/u and management if needed, including CXR/advanced chest imaging, labs.   If there are any worsening or concerning signs or symptoms, patient will report to the ED and/or contact [id number] for immediate evaluation. Teach back method was used. All patient questions answered. Pt voiced understanding. All patient questions answered. Pt voiced understanding. There are no discontinued medications. Patient received counseling on the following healthy behaviors: nutrition, exercise and medication adherence. I encouraged and discussed lifestyle modifications including diet and exercise and the patient was agreeable to making positive/beneficial changes to both to help improve their overall health. Discussed use, benefit, and side effects of prescribed medications. Barriers to medication compliance addressed. Patient given educational materials: see patient instructions. HM - HM items completed today as per orders. Outstanding HM items though not limited to immunizations were discussed with the patient today, including risks, benefits and alternatives. The patient will discuss these during the next appointment per their preference. If there are any worsening or concerning signs or symptoms, patient will report to the ED and/or contact [id number] for immediate evaluation. Teach back method was used. Subjective:    HPI  Chief Complaint   Patient presents with    Cough    Congestion     Cough and Congestion and worsening for 2 days  Nature of Onset and Mechanism -  gradual, worsening  Characteristics/Radiation/Quality - dry / productive cough, sore throat, diarrhea, body aches, HA, chest discomfort from coughing. The patient travels a lot of work and it is worse during air travel. He has a history of asthma, treated 20+ years ago. He also has allergies and takes Allegra. No known night sweats/fever/chills. No known heartburn.   Relieving Factors/Treatment tried - otc medications    Health Maintenance -   Alcohol/Substance use History - None/Minimal    Tobacco Use      Smoking status: Former      Smokeless tobacco: Former      Tobacco comments: Previously smoked cigars on occasion    Family History   Problem Relation Age of Onset    Diabetes Mother     Coronary Art Dis Mother     COPD Mother     Congenital Heart Disease Father     Coronary Art Dis Father     Irritable Bowel Syndrome Father     Irritable Bowel Syndrome Paternal Grandfather     Colon Cancer Paternal Grandfather        [facility] Scores 2/20/2023 11/8/2022 10/18/2022 6/6/2022 11/8/2021   PHQ2 Score 0 0 0 0 0   PHQ9 Score 0 0 0 0 0     Interpretation of Total Score Depression Severity: 1-4 = Minimal depression, 5-9 = Mild depression, 10-14 = Moderate depression, 15-19 = Moderately severe depression, 20-27 = Severe depression    Review of Systems  Constitutional: Negative for activity change, appetite change, chills, diaphoresis, fatigue, fever and unexpected weight change. HENT: Negative for sinus pressure, sinus pain, sore throat and trouble swallowing. Respiratory: Positive for cough, shortness of breath and wheezing. Cardiovascular: Negative for chest pain, palpitations and leg swelling. Gastrointestinal: Negative for abdominal pain, diarrhea, nausea and vomiting. Endocrine: Negative for cold intolerance, polydipsia, polyphagia and polyuria. Genitourinary: Negative for difficulty urinating, flank pain and frequency. Musculoskeletal: Negative for gait problem and joint swelling. Negative for back pain, neck pain and neck stiffness. Skin: Negative for color change and wound. Negative for pallor and rash. Allergic/Immunologic: Negative for environmental allergies and food allergies. Neurological: Negative for light-headedness, numbness and headaches. Psychiatric/Behavioral: Negative for sleep disturbance. Negative for confusion and suicidal ideas.      Objective:    /80   Pulse 100   Ht 6' (1.829 m)   Wt 265 lb (120.2 kg)   SpO2 95%   BMI 35.94 kg/m²    BP Readings from Last 3 Encounters:   03/17/23 124/80   02/21/23 130/80   02/20/23 126/88     Physical Exam  Constitutional: Patient is oriented to person, place, and time. Patient appears well-developed and well-nourished. No distress. HENT: Head: Normocephalic and atraumatic. Tm/Ear canal, without any discharge noted. Eyes: Pupils are equal, round, and reactive to light. Conjunctivae are normal. Right eye exhibits no discharge. Left eye exhibits no discharge. Cardiovascular: Normal rate, regular rhythm and normal heart sounds. Pulmonary/Chest: Effort normal and breath sounds normal. No respiratory distress. Patient has some wheezing to posterior lung fields. Abdominal: Soft. Bowel sounds are normal. Patient exhibits no distension. There is no tenderness. Musculoskeletal:  Patient exhibits no edema and tenderness. Patient exhibits no deformity. Neurological: Patient is alert and oriented to person, place, and time. Skin: Skin is warm and dry. Patient is not diaphoretic. Psychiatric: Patient's speech is normal and behavior is normal. Thought content normal.   Vitals reviewed. Lab Results   Component Value Date    WBC 9.1 02/21/2023    HGB 15.3 02/21/2023    HCT 43.1 02/21/2023     02/21/2023    CHOL 171 02/21/2023    TRIG 261 (H) 02/21/2023    HDL 36 (L) 02/21/2023    LDLDIRECT 194 (H) 05/31/2022    ALT 53 (H) 02/21/2023    AST 32 02/21/2023     02/21/2023    K 4.3 02/21/2023     02/21/2023    CREATININE 0.72 02/21/2023    BUN 13 02/21/2023    CO2 26 02/21/2023    TSH 3.22 11/09/2021    PSA 0.28 02/21/2023    LABA1C 7.0 (H) 02/21/2023    LABMICR 24 (H) 02/21/2023     Lab Results   Component Value Date    CALCIUM 8.9 02/21/2023     Lab Results   Component Value Date    LDLCHOLESTEROL 83 02/21/2023    LDLDIRECT 194 (H) 05/31/2022       Please note that this chart was generated using voice recognition Dragon dictation software. Although every effort was made to ensure the accuracy of this automated transcription, some errors in transcription may have occurred.     Electronically signed by Dr. Nathalie Lloyd MD on 3/17/2023 at 10:25 AM

## 2023-05-12 ENCOUNTER — TELEPHONE (OUTPATIENT)
Dept: GASTROENTEROLOGY | Age: 48
End: 2023-05-12

## 2023-09-01 RX ORDER — AMLODIPINE BESYLATE 5 MG/1
5 TABLET ORAL DAILY
Qty: 90 TABLET | Refills: 1 | Status: SHIPPED | OUTPATIENT
Start: 2023-09-01

## 2023-10-27 RX ORDER — LISINOPRIL 40 MG/1
40 TABLET ORAL DAILY
Qty: 90 TABLET | Refills: 1 | Status: SHIPPED | OUTPATIENT
Start: 2023-10-27 | End: 2023-11-20 | Stop reason: ALTCHOICE

## 2023-10-30 ENCOUNTER — TELEPHONE (OUTPATIENT)
Dept: DIABETES SERVICES | Age: 48
End: 2023-10-30

## 2023-11-07 ENCOUNTER — TELEPHONE (OUTPATIENT)
Dept: CARDIOLOGY | Age: 48
End: 2023-11-07

## 2023-11-07 RX ORDER — AMLODIPINE BESYLATE 10 MG/1
10 TABLET ORAL DAILY
Qty: 90 TABLET | Refills: 3 | Status: SHIPPED | OUTPATIENT
Start: 2023-11-07

## 2023-11-07 NOTE — PROGRESS NOTES
Increase Norvasc to 10 mg daily. Please schedule a follow up for after his stress test and call with an updated BP log in the next couple of days.

## 2023-11-07 NOTE — TELEPHONE ENCOUNTER
Called and states his /117 when sent home from ER. He is in [place]. He has had headache for a month 184/98 consistent 180's. He is coming home for stress test. Anyway we can change meds? Please advise.

## 2023-11-15 ENCOUNTER — OFFICE VISIT (OUTPATIENT)
Dept: PRIMARY CARE CLINIC | Age: 48
End: 2023-11-15
Payer: COMMERCIAL

## 2023-11-15 VITALS
HEIGHT: 72 IN | OXYGEN SATURATION: 97 % | BODY MASS INDEX: 36.19 KG/M2 | DIASTOLIC BLOOD PRESSURE: 92 MMHG | HEART RATE: 90 BPM | SYSTOLIC BLOOD PRESSURE: 150 MMHG | WEIGHT: 267.2 LBS

## 2023-11-15 DIAGNOSIS — F31.81 BIPOLAR 2 DISORDER (HCC): ICD-10-CM

## 2023-11-15 DIAGNOSIS — E11.9 TYPE 2 DIABETES MELLITUS WITHOUT COMPLICATION, WITHOUT LONG-TERM CURRENT USE OF INSULIN (HCC): Primary | ICD-10-CM

## 2023-11-15 DIAGNOSIS — I10 PRIMARY HYPERTENSION: ICD-10-CM

## 2023-11-15 DIAGNOSIS — E78.2 MIXED HYPERLIPIDEMIA: ICD-10-CM

## 2023-11-15 LAB — HBA1C MFR BLD: 6.9 %

## 2023-11-15 PROCEDURE — 99214 OFFICE O/P EST MOD 30 MIN: CPT | Performed by: FAMILY MEDICINE

## 2023-11-15 PROCEDURE — G8427 DOCREV CUR MEDS BY ELIG CLIN: HCPCS | Performed by: FAMILY MEDICINE

## 2023-11-15 PROCEDURE — 83036 HEMOGLOBIN GLYCOSYLATED A1C: CPT | Performed by: FAMILY MEDICINE

## 2023-11-15 PROCEDURE — 2022F DILAT RTA XM EVC RTNOPTHY: CPT | Performed by: FAMILY MEDICINE

## 2023-11-15 PROCEDURE — 3077F SYST BP >= 140 MM HG: CPT | Performed by: FAMILY MEDICINE

## 2023-11-15 PROCEDURE — 1036F TOBACCO NON-USER: CPT | Performed by: FAMILY MEDICINE

## 2023-11-15 PROCEDURE — G8417 CALC BMI ABV UP PARAM F/U: HCPCS | Performed by: FAMILY MEDICINE

## 2023-11-15 PROCEDURE — 3080F DIAST BP >= 90 MM HG: CPT | Performed by: FAMILY MEDICINE

## 2023-11-15 PROCEDURE — G8484 FLU IMMUNIZE NO ADMIN: HCPCS | Performed by: FAMILY MEDICINE

## 2023-11-15 PROCEDURE — 3044F HG A1C LEVEL LT 7.0%: CPT | Performed by: FAMILY MEDICINE

## 2023-11-15 RX ORDER — GLIPIZIDE 5 MG/1
5 TABLET ORAL 2 TIMES DAILY
Qty: 60 TABLET | Refills: 3 | Status: SHIPPED | OUTPATIENT
Start: 2023-11-15

## 2023-11-15 RX ORDER — LURASIDONE HYDROCHLORIDE 20 MG/1
20 TABLET, FILM COATED ORAL
Qty: 90 TABLET | Refills: 0 | Status: SHIPPED | OUTPATIENT
Start: 2023-11-15 | End: 2024-02-13

## 2023-11-15 RX ORDER — LAMOTRIGINE 200 MG/1
200 TABLET ORAL DAILY
Qty: 90 TABLET | Refills: 0 | Status: SHIPPED | OUTPATIENT
Start: 2023-11-15 | End: 2024-02-13

## 2023-11-15 NOTE — PROGRESS NOTES
[facility]      Patient:  Theodore Menard 51 y.o. male     Date of Service: 11/15/23      Chief Complaint:   Chief Complaint   Patient presents with    Medication Refill     Patient needs sugar checked         History of Present Illness   Has a known history of bipolar 2. He was previously following with a psychiatric practitioner with [facility] however given the fact that he works remotely and is unable to attend appointments regularly he was discharged from their practice and medications were not renewed. Currently notes stable mood, no SI/HI. No VH/AH. Requesting refills on Lamictal and Latuda. Tolerates them well without side effects or intolerances and takes them regularly. Current currently maintained on amlodipine 10 mg daily, lisinopril 40 mg daily for history of hypertension. Amlodipine was recently increased to 10. Has stress testing to be done tomorrow and close follow-up with cardiology soon after. Otherwise asymptomatic at this time with no CP/SOB. Does follow a low-sodium diet. Is physically active with his job. Does check home BPs average readings around 147-828 systolic. However checks it after he has been physically active    Hyperlipidemia:  Current treatment includes atorvastatin 80mg QD. He denies side effects with this medication. He admits daily medication compliance. Also with a known history of type 2 diabetes, A1c this office visit is 6.9. No current hypo/hyperglycemic symptoms. Does not regularly check blood sugars. Does not have a CGM in place. Was previously tried on metformin/metformin XR at lowest dose daily however was unable to tolerate it due to abdominal side effects. Allergies:    Patient has no known allergies.     Medication List:    Current Outpatient Medications   Medication Sig Dispense Refill    glipiZIDE (GLUCOTROL) 5 MG tablet Take 1 tablet by mouth 2 times daily 60 tablet 3    lurasidone (LATUDA) 20 MG TABS tablet

## 2023-11-16 ENCOUNTER — HOSPITAL ENCOUNTER (OUTPATIENT)
Dept: NUCLEAR MEDICINE | Age: 48
Discharge: HOME | End: 2023-11-18
Payer: COMMERCIAL

## 2023-11-16 ENCOUNTER — HOSPITAL ENCOUNTER (OUTPATIENT)
Age: 48
Discharge: HOME | End: 2023-11-18
Payer: COMMERCIAL

## 2023-11-16 DIAGNOSIS — Z01.810 PREOP CARDIOVASCULAR EXAM: ICD-10-CM

## 2023-11-16 DIAGNOSIS — R94.39 ABNORMAL STRESS TEST: ICD-10-CM

## 2023-11-16 PROCEDURE — 3430000000 HC RX DIAGNOSTIC RADIOPHARMACEUTICAL: Performed by: PHYSICIAN ASSISTANT

## 2023-11-16 PROCEDURE — 93017 CV STRESS TEST TRACING ONLY: CPT

## 2023-11-16 PROCEDURE — A9500 TC99M SESTAMIBI: HCPCS | Performed by: PHYSICIAN ASSISTANT

## 2023-11-16 RX ORDER — TETRAKIS(2-METHOXYISOBUTYLISOCYANIDE)COPPER(I) TETRAFLUOROBORATE 1 MG/ML
30 INJECTION, POWDER, LYOPHILIZED, FOR SOLUTION INTRAVENOUS
Status: COMPLETED | OUTPATIENT
Start: 2023-11-16 | End: 2023-11-16

## 2023-11-16 RX ADMIN — Medication 30 MILLICURIE: at 10:14

## 2023-11-16 NOTE — PROGRESS NOTES
Reviewed stress EKG tracings with Dr Brit Brunson. He will come review with pt before he leaves today. Pt aware.

## 2023-11-17 ENCOUNTER — HOSPITAL ENCOUNTER (OUTPATIENT)
Dept: NUCLEAR MEDICINE | Age: 48
Discharge: HOME | End: 2023-11-19
Payer: COMMERCIAL

## 2023-11-17 PROCEDURE — A9500 TC99M SESTAMIBI: HCPCS | Performed by: PHYSICIAN ASSISTANT

## 2023-11-17 PROCEDURE — 3430000000 HC RX DIAGNOSTIC RADIOPHARMACEUTICAL: Performed by: PHYSICIAN ASSISTANT

## 2023-11-17 RX ORDER — TETRAKIS(2-METHOXYISOBUTYLISOCYANIDE)COPPER(I) TETRAFLUOROBORATE 1 MG/ML
30 INJECTION, POWDER, LYOPHILIZED, FOR SOLUTION INTRAVENOUS
Status: COMPLETED | OUTPATIENT
Start: 2023-11-17 | End: 2023-11-17

## 2023-11-17 RX ADMIN — Medication 30 MILLICURIE: at 08:29

## 2023-11-18 LAB
NUC STRESS EJECTION FRACTION: 72 %
STRESS BASELINE DIAS BP: 98 MMHG
STRESS BASELINE HR: 85 BPM
STRESS BASELINE SYS BP: 164 MMHG
STRESS ESTIMATED WORKLOAD: 8.7 METS
STRESS EXERCISE DUR MIN: 7 MIN
STRESS EXERCISE DUR SEC: 35 SEC
STRESS PEAK DIAS BP: 100 MMHG
STRESS PEAK SYS BP: 210 MMHG
STRESS PERCENT HR ACHIEVED: 87 %
STRESS POST PEAK HR: 148 BPM
STRESS RATE PRESSURE PRODUCT: NORMAL BPM*MMHG
STRESS STAGE 1 BP: NORMAL MMHG
STRESS STAGE 1 DURATION: 3 MIN:SEC
STRESS STAGE 1 HR: 120 BPM
STRESS STAGE 2 DURATION: 6 MIN:SEC
STRESS STAGE 2 HR: 137 BPM
STRESS STAGE RECOVERY 1 BP: NORMAL MMHG
STRESS STAGE RECOVERY 1 DURATION: 0 MIN:SEC
STRESS STAGE RECOVERY 1 HR: 145 BPM
STRESS STAGE RECOVERY 2 DURATION: 1 MIN:SEC
STRESS STAGE RECOVERY 2 HR: 136 BPM
STRESS STAGE RECOVERY 3 BP: NORMAL MMHG
STRESS STAGE RECOVERY 3 DURATION: 3 MIN:SEC
STRESS STAGE RECOVERY 3 HR: 91 BPM
STRESS STAGE RECOVERY 4 BP: NORMAL MMHG
STRESS STAGE RECOVERY 4 DURATION: 5 MIN:SEC
STRESS STAGE RECOVERY 4 HR: 92 BPM
STRESS TARGET HR: 171 BPM
TID: 0.94

## 2023-11-20 ENCOUNTER — TELEPHONE (OUTPATIENT)
Dept: CARDIOLOGY | Age: 48
End: 2023-11-20

## 2023-11-20 ENCOUNTER — HOSPITAL ENCOUNTER (OUTPATIENT)
Age: 48
Discharge: HOME | End: 2023-11-20
Payer: COMMERCIAL

## 2023-11-20 ENCOUNTER — OFFICE VISIT (OUTPATIENT)
Dept: CARDIOLOGY | Age: 48
End: 2023-11-20
Payer: COMMERCIAL

## 2023-11-20 ENCOUNTER — HOSPITAL ENCOUNTER (OUTPATIENT)
Age: 48
Discharge: HOME | End: 2023-11-22
Payer: COMMERCIAL

## 2023-11-20 VITALS
OXYGEN SATURATION: 98 % | WEIGHT: 268.2 LBS | RESPIRATION RATE: 18 BRPM | HEIGHT: 72 IN | SYSTOLIC BLOOD PRESSURE: 152 MMHG | DIASTOLIC BLOOD PRESSURE: 85 MMHG | BODY MASS INDEX: 36.33 KG/M2 | HEART RATE: 82 BPM

## 2023-11-20 VITALS
WEIGHT: 268 LBS | DIASTOLIC BLOOD PRESSURE: 85 MMHG | HEIGHT: 72 IN | SYSTOLIC BLOOD PRESSURE: 152 MMHG | BODY MASS INDEX: 36.3 KG/M2

## 2023-11-20 DIAGNOSIS — I10 PRIMARY HYPERTENSION: ICD-10-CM

## 2023-11-20 DIAGNOSIS — R42 DIZZINESS: ICD-10-CM

## 2023-11-20 DIAGNOSIS — R94.39 ABNORMAL STRESS TEST: ICD-10-CM

## 2023-11-20 DIAGNOSIS — E66.09 CLASS 2 OBESITY DUE TO EXCESS CALORIES WITH BODY MASS INDEX (BMI) OF 36.0 TO 36.9 IN ADULT, UNSPECIFIED WHETHER SERIOUS COMORBIDITY PRESENT: ICD-10-CM

## 2023-11-20 DIAGNOSIS — E78.2 MIXED HYPERLIPIDEMIA: ICD-10-CM

## 2023-11-20 DIAGNOSIS — R94.39 ABNORMAL STRESS ECG: ICD-10-CM

## 2023-11-20 DIAGNOSIS — R07.9 CHEST PAIN, UNSPECIFIED TYPE: ICD-10-CM

## 2023-11-20 DIAGNOSIS — R06.02 SOB (SHORTNESS OF BREATH): ICD-10-CM

## 2023-11-20 DIAGNOSIS — R00.2 PALPITATIONS: ICD-10-CM

## 2023-11-20 PROBLEM — R07.89 ATYPICAL CHEST PAIN: Status: ACTIVE | Noted: 2023-11-20

## 2023-11-20 LAB
ANION GAP SERPL CALCULATED.3IONS-SCNC: 12 MMOL/L (ref 9–17)
BUN SERPL-MCNC: 13 MG/DL (ref 6–20)
BUN/CREAT SERPL: 19 (ref 9–20)
CALCIUM SERPL-MCNC: 10 MG/DL (ref 8.6–10.4)
CHLORIDE SERPL-SCNC: 100 MMOL/L (ref 98–107)
CO2 SERPL-SCNC: 25 MMOL/L (ref 20–31)
CREAT SERPL-MCNC: 0.7 MG/DL (ref 0.7–1.2)
ECHO AO ROOT DIAM: 2.8 CM
ECHO AO ROOT DIAM: 3.4 CM
ECHO AO ROOT DIAM: 3.5 CM
ECHO AV ACCELERATION TIME: 49.73 MS
ECHO AV MEAN GRADIENT: 6 MMHG
ECHO AV MEAN VELOCITY: 1.2 M/S
ECHO AV PEAK VELOCITY: 1.6 M/S
ECHO AV VTI: 31.2 CM
ECHO BSA: 2.48 M2
ECHO LA DIAMETER INDEX: 1.91 CM/M2
ECHO LA DIAMETER: 4.6 CM
ECHO LA MAJOR AXIS: 6.1 CM
ECHO LA VOL BP: 62 ML (ref 18–58)
ECHO LA VOL MOD A2C: 54 ML (ref 18–58)
ECHO LA VOL MOD A4C: 71 ML (ref 18–58)
ECHO LA VOL/BSA BIPLANE: 26 ML/M2 (ref 16–34)
ECHO LA VOLUME AREA LENGTH: 66 ML
ECHO LA VOLUME INDEX AREA LENGTH: 27 ML/M2 (ref 16–34)
ECHO LA VOLUME INDEX MOD A2C: 22 ML/M2 (ref 16–34)
ECHO LA VOLUME INDEX MOD A4C: 29 ML/M2 (ref 16–34)
ECHO LV CARDIAC INDEX: 2.7 L/MIN/M2
ECHO LV E' LATERAL VELOCITY: 10 CM/S
ECHO LV EDV A2C: 73 ML
ECHO LV EDV A4C: 89 ML
ECHO LV EDV BP: 83 ML (ref 67–155)
ECHO LV EDV INDEX A4C: 37 ML/M2
ECHO LV EDV INDEX BP: 34 ML/M2
ECHO LV EDV NDEX A2C: 30 ML/M2
ECHO LV EJECTION FRACTION BIPLANE: 56 % (ref 55–100)
ECHO LV ESV A2C: 31 ML
ECHO LV ESV A4C: 43 ML
ECHO LV ESV BP: 37 ML (ref 22–58)
ECHO LV ESV INDEX A2C: 13 ML/M2
ECHO LV ESV INDEX A4C: 18 ML/M2
ECHO LV ESV INDEX BP: 15 ML/M2
ECHO LV FRACTIONAL SHORTENING: 29 % (ref 28–44)
ECHO LV INTERNAL DIMENSION DIASTOLE INDEX: 2.03 CM/M2
ECHO LV INTERNAL DIMENSION DIASTOLIC: 4.9 CM (ref 4.2–5.9)
ECHO LV INTERNAL DIMENSION SYSTOLIC INDEX: 1.45 CM/M2
ECHO LV INTERNAL DIMENSION SYSTOLIC: 3.5 CM
ECHO LV IVSD: 1.1 CM (ref 0.6–1)
ECHO LV IVSS: 1.5 CM
ECHO LV MASS 2D: 239.9 G (ref 88–224)
ECHO LV MASS INDEX 2D: 99.5 G/M2 (ref 49–115)
ECHO LV POSTERIOR WALL DIASTOLIC: 1.4 CM (ref 0.6–1)
ECHO LV POSTERIOR WALL SYSTOLIC: 1.9 CM
ECHO LV RELATIVE WALL THICKNESS RATIO: 0.57
ECHO LVOT AREA: 3.8 CM2
ECHO LVOT AV VTI INDEX: 0.73
ECHO LVOT CARDIAC OUTPUT: 6.5 L/MIN
ECHO LVOT DIAM: 2.2 CM
ECHO LVOT MEAN GRADIENT: 3 MMHG
ECHO LVOT STROKE VOLUME INDEX: 36.1 ML/M2
ECHO LVOT SV: 87 ML
ECHO LVOT VTI: 22.9 CM
ECHO MV A VELOCITY: 0.68 M/S
ECHO MV E DECELERATION TIME (DT): 161.2 MS
ECHO MV E VELOCITY: 0.86 M/S
ECHO MV E/A RATIO: 1.26
ECHO MV E/E' LATERAL: 8.6
ECHO PV MAX VELOCITY: 1 M/S
ECHO RV INTERNAL DIMENSION: 3.6 CM
ERYTHROCYTE [DISTWIDTH] IN BLOOD BY AUTOMATED COUNT: 11.9 % (ref 11.8–14.4)
GFR SERPL CREATININE-BSD FRML MDRD: >60 ML/MIN/1.73M2
GLUCOSE SERPL-MCNC: 179 MG/DL (ref 70–99)
HCT VFR BLD AUTO: 43.7 % (ref 40.7–50.3)
HGB BLD-MCNC: 15.6 G/DL (ref 13–17)
MCH RBC QN AUTO: 31.8 PG (ref 25.2–33.5)
MCHC RBC AUTO-ENTMCNC: 35.7 G/DL (ref 28.4–34.8)
MCV RBC AUTO: 89 FL (ref 82.6–102.9)
NRBC BLD-RTO: 0 PER 100 WBC
PLATELET # BLD AUTO: 197 K/UL (ref 138–453)
PMV BLD AUTO: 10.7 FL (ref 8.1–13.5)
POTASSIUM SERPL-SCNC: 5 MMOL/L (ref 3.7–5.3)
RBC # BLD AUTO: 4.91 M/UL (ref 4.21–5.77)
SODIUM SERPL-SCNC: 137 MMOL/L (ref 135–144)
WBC OTHER # BLD: 8.4 K/UL (ref 3.5–11.3)

## 2023-11-20 PROCEDURE — G8484 FLU IMMUNIZE NO ADMIN: HCPCS | Performed by: PHYSICIAN ASSISTANT

## 2023-11-20 PROCEDURE — 3079F DIAST BP 80-89 MM HG: CPT | Performed by: PHYSICIAN ASSISTANT

## 2023-11-20 PROCEDURE — 1036F TOBACCO NON-USER: CPT | Performed by: PHYSICIAN ASSISTANT

## 2023-11-20 PROCEDURE — 3077F SYST BP >= 140 MM HG: CPT | Performed by: PHYSICIAN ASSISTANT

## 2023-11-20 PROCEDURE — G8427 DOCREV CUR MEDS BY ELIG CLIN: HCPCS | Performed by: PHYSICIAN ASSISTANT

## 2023-11-20 PROCEDURE — G8417 CALC BMI ABV UP PARAM F/U: HCPCS | Performed by: PHYSICIAN ASSISTANT

## 2023-11-20 PROCEDURE — 85027 COMPLETE CBC AUTOMATED: CPT

## 2023-11-20 PROCEDURE — 80048 BASIC METABOLIC PNL TOTAL CA: CPT

## 2023-11-20 PROCEDURE — 99214 OFFICE O/P EST MOD 30 MIN: CPT | Performed by: PHYSICIAN ASSISTANT

## 2023-11-20 PROCEDURE — 36415 COLL VENOUS BLD VENIPUNCTURE: CPT

## 2023-11-20 PROCEDURE — 93306 TTE W/DOPPLER COMPLETE: CPT

## 2023-11-20 RX ORDER — CARVEDILOL 6.25 MG/1
6.25 TABLET ORAL 2 TIMES DAILY
Qty: 180 TABLET | Refills: 3 | Status: SHIPPED | OUTPATIENT
Start: 2023-11-20

## 2023-11-20 RX ORDER — LISINOPRIL 40 MG/1
40 TABLET ORAL DAILY
Qty: 90 TABLET | Refills: 3 | Status: SHIPPED | OUTPATIENT
Start: 2023-11-20

## 2023-11-20 RX ORDER — ASPIRIN 81 MG/1
81 TABLET ORAL DAILY
COMMUNITY

## 2023-11-20 NOTE — PATIENT INSTRUCTIONS
SURVEY:    You may be receiving a survey from [employer] regarding your visit today. Please complete the survey to enable us to provide the highest quality of care to you and your family. If you cannot score us a very good on any question, please call the office to discuss how we could have made your experience a very good one. Thank you.

## 2023-11-20 NOTE — TELEPHONE ENCOUNTER
----- Message from Hailey Castillo PA-C sent at 11/20/2023  1:36 PM EST -----  Please notify patient that their lab results are normal.   Please continue current treatment and follow up.

## 2023-11-21 ENCOUNTER — APPOINTMENT (OUTPATIENT)
Dept: GENERAL RADIOLOGY | Age: 48
DRG: 282 | End: 2023-11-21
Attending: HOSPITALIST
Payer: COMMERCIAL

## 2023-11-21 ENCOUNTER — HOSPITAL ENCOUNTER (OUTPATIENT)
Age: 48
Discharge: ANOTHER HOSPITAL | End: 2023-11-21
Attending: FAMILY MEDICINE | Admitting: FAMILY MEDICINE
Payer: COMMERCIAL

## 2023-11-21 ENCOUNTER — TELEPHONE (OUTPATIENT)
Dept: CARDIOLOGY | Age: 48
End: 2023-11-21

## 2023-11-21 ENCOUNTER — HOSPITAL ENCOUNTER (INPATIENT)
Age: 48
LOS: 2 days | Discharge: HOME | DRG: 282 | End: 2023-11-23
Attending: HOSPITALIST | Admitting: HOSPITALIST
Payer: COMMERCIAL

## 2023-11-21 VITALS
TEMPERATURE: 98.1 F | OXYGEN SATURATION: 98 % | HEART RATE: 75 BPM | SYSTOLIC BLOOD PRESSURE: 154 MMHG | DIASTOLIC BLOOD PRESSURE: 92 MMHG | RESPIRATION RATE: 21 BRPM

## 2023-11-21 DIAGNOSIS — I25.10 CAD IN NATIVE ARTERY: ICD-10-CM

## 2023-11-21 DIAGNOSIS — R07.89 ATYPICAL CHEST PAIN: ICD-10-CM

## 2023-11-21 DIAGNOSIS — R06.02 SOB (SHORTNESS OF BREATH): ICD-10-CM

## 2023-11-21 LAB
ANION GAP SERPL CALC-SCNC: 11 MEQ/L (ref 8–16)
APTT PPP: 28.3 SECONDS (ref 22–38)
BASOPHILS ABSOLUTE: 0 THOU/MM3 (ref 0–0.1)
BASOPHILS NFR BLD AUTO: 0.5 %
BUN SERPL-MCNC: 12 MG/DL (ref 7–22)
CALCIUM SERPL-MCNC: 9 MG/DL (ref 8.5–10.5)
CHLORIDE SERPL-SCNC: 101 MEQ/L (ref 98–111)
CO2 SERPL-SCNC: 23 MEQ/L (ref 23–33)
CREAT SERPL-MCNC: 0.6 MG/DL (ref 0.4–1.2)
DEPRECATED RDW RBC AUTO: 39.6 FL (ref 35–45)
EOSINOPHIL NFR BLD AUTO: 1.4 %
EOSINOPHILS ABSOLUTE: 0.1 THOU/MM3 (ref 0–0.4)
ERYTHROCYTE [DISTWIDTH] IN BLOOD BY AUTOMATED COUNT: 12.1 % (ref 11.5–14.5)
GFR SERPL CREATININE-BSD FRML MDRD: > 60 ML/MIN/1.73M2
GLUCOSE BLD STRIP.AUTO-MCNC: 168 MG/DL (ref 70–108)
GLUCOSE BLD-MCNC: 146 MG/DL (ref 74–100)
GLUCOSE SERPL-MCNC: 175 MG/DL (ref 70–108)
HCT VFR BLD AUTO: 42.6 % (ref 42–52)
HGB BLD-MCNC: 15 GM/DL (ref 14–18)
IMM GRANULOCYTES # BLD AUTO: 0.05 THOU/MM3 (ref 0–0.07)
IMM GRANULOCYTES NFR BLD AUTO: 0.6 %
INR PPP: 0.99 (ref 0.85–1.13)
LYMPHOCYTES ABSOLUTE: 2.6 THOU/MM3 (ref 1–4.8)
LYMPHOCYTES NFR BLD AUTO: 31.6 %
MAGNESIUM SERPL-MCNC: 1.9 MG/DL (ref 1.6–2.4)
MCH RBC QN AUTO: 31.4 PG (ref 26–33)
MCHC RBC AUTO-ENTMCNC: 35.2 GM/DL (ref 32.2–35.5)
MCV RBC AUTO: 89.3 FL (ref 80–94)
MONOCYTES ABSOLUTE: 0.9 THOU/MM3 (ref 0.4–1.3)
MONOCYTES NFR BLD AUTO: 10.9 %
NEUTROPHILS NFR BLD AUTO: 55 %
NRBC BLD AUTO-RTO: 0 /100 WBC
PLATELET # BLD AUTO: 192 THOU/MM3 (ref 130–400)
PMV BLD AUTO: 10.5 FL (ref 9.4–12.4)
POTASSIUM SERPL-SCNC: 4.1 MEQ/L (ref 3.5–5.2)
RBC # BLD AUTO: 4.77 MILL/MM3 (ref 4.7–6.1)
SEGMENTED NEUTROPHILS ABSOLUTE COUNT: 4.6 THOU/MM3 (ref 1.8–7.7)
SODIUM SERPL-SCNC: 135 MEQ/L (ref 135–145)
WBC # BLD AUTO: 8.3 THOU/MM3 (ref 4.8–10.8)

## 2023-11-21 PROCEDURE — 85610 PROTHROMBIN TIME: CPT

## 2023-11-21 PROCEDURE — 6360000002 HC RX W HCPCS

## 2023-11-21 PROCEDURE — B2111ZZ FLUOROSCOPY OF MULTIPLE CORONARY ARTERIES USING LOW OSMOLAR CONTRAST: ICD-10-PCS | Performed by: HOSPITALIST

## 2023-11-21 PROCEDURE — 99152 MOD SED SAME PHYS/QHP 5/>YRS: CPT | Performed by: FAMILY MEDICINE

## 2023-11-21 PROCEDURE — 93005 ELECTROCARDIOGRAM TRACING: CPT | Performed by: NURSE PRACTITIONER

## 2023-11-21 PROCEDURE — 2500000003 HC RX 250 WO HCPCS

## 2023-11-21 PROCEDURE — 2500000003 HC RX 250 WO HCPCS: Performed by: FAMILY MEDICINE

## 2023-11-21 PROCEDURE — 71045 X-RAY EXAM CHEST 1 VIEW: CPT

## 2023-11-21 PROCEDURE — 2709999900 HC NON-CHARGEABLE SUPPLY: Performed by: FAMILY MEDICINE

## 2023-11-21 PROCEDURE — 2580000003 HC RX 258: Performed by: NURSE PRACTITIONER

## 2023-11-21 PROCEDURE — 6360000004 HC RX CONTRAST MEDICATION: Performed by: FAMILY MEDICINE

## 2023-11-21 PROCEDURE — 85025 COMPLETE CBC W/AUTO DIFF WBC: CPT

## 2023-11-21 PROCEDURE — 7100000010 HC PHASE II RECOVERY - FIRST 15 MIN: Performed by: FAMILY MEDICINE

## 2023-11-21 PROCEDURE — 36415 COLL VENOUS BLD VENIPUNCTURE: CPT

## 2023-11-21 PROCEDURE — 7100000011 HC PHASE II RECOVERY - ADDTL 15 MIN: Performed by: FAMILY MEDICINE

## 2023-11-21 PROCEDURE — 82948 REAGENT STRIP/BLOOD GLUCOSE: CPT

## 2023-11-21 PROCEDURE — 6360000002 HC RX W HCPCS: Performed by: FAMILY MEDICINE

## 2023-11-21 PROCEDURE — 4A023N7 MEASUREMENT OF CARDIAC SAMPLING AND PRESSURE, LEFT HEART, PERCUTANEOUS APPROACH: ICD-10-PCS | Performed by: HOSPITALIST

## 2023-11-21 PROCEDURE — 2580000003 HC RX 258: Performed by: FAMILY MEDICINE

## 2023-11-21 PROCEDURE — 85730 THROMBOPLASTIN TIME PARTIAL: CPT

## 2023-11-21 PROCEDURE — C1769 GUIDE WIRE: HCPCS | Performed by: FAMILY MEDICINE

## 2023-11-21 PROCEDURE — 6370000000 HC RX 637 (ALT 250 FOR IP)

## 2023-11-21 PROCEDURE — 80048 BASIC METABOLIC PNL TOTAL CA: CPT

## 2023-11-21 PROCEDURE — C1894 INTRO/SHEATH, NON-LASER: HCPCS | Performed by: FAMILY MEDICINE

## 2023-11-21 PROCEDURE — 83735 ASSAY OF MAGNESIUM: CPT

## 2023-11-21 PROCEDURE — 99222 1ST HOSP IP/OBS MODERATE 55: CPT

## 2023-11-21 PROCEDURE — 82947 ASSAY GLUCOSE BLOOD QUANT: CPT

## 2023-11-21 PROCEDURE — 1200000003 HC TELEMETRY R&B

## 2023-11-21 PROCEDURE — 93458 L HRT ARTERY/VENTRICLE ANGIO: CPT | Performed by: FAMILY MEDICINE

## 2023-11-21 PROCEDURE — 94761 N-INVAS EAR/PLS OXIMETRY MLT: CPT

## 2023-11-21 RX ORDER — SODIUM CHLORIDE 0.9 % (FLUSH) 0.9 %
5-40 SYRINGE (ML) INJECTION PRN
Status: DISCONTINUED | OUTPATIENT
Start: 2023-11-21 | End: 2023-11-21 | Stop reason: HOSPADM

## 2023-11-21 RX ORDER — DIPHENHYDRAMINE HCL 25 MG
50 CAPSULE ORAL ONCE
Status: DISCONTINUED | OUTPATIENT
Start: 2023-11-21 | End: 2023-11-21 | Stop reason: HOSPADM

## 2023-11-21 RX ORDER — ASPIRIN 81 MG/1
81 TABLET, CHEWABLE ORAL DAILY
Status: DISCONTINUED | OUTPATIENT
Start: 2023-11-21 | End: 2023-11-21

## 2023-11-21 RX ORDER — NITROGLYCERIN 20 MG/100ML
5-200 INJECTION INTRAVENOUS CONTINUOUS
Status: DISCONTINUED | OUTPATIENT
Start: 2023-11-21 | End: 2023-11-21

## 2023-11-21 RX ORDER — SODIUM CHLORIDE 0.9 % (FLUSH) 0.9 %
5-40 SYRINGE (ML) INJECTION EVERY 12 HOURS SCHEDULED
Status: DISCONTINUED | OUTPATIENT
Start: 2023-11-21 | End: 2023-11-21 | Stop reason: HOSPADM

## 2023-11-21 RX ORDER — MIDAZOLAM HYDROCHLORIDE 1 MG/ML
INJECTION INTRAMUSCULAR; INTRAVENOUS PRN
Status: DISCONTINUED | OUTPATIENT
Start: 2023-11-21 | End: 2023-11-21 | Stop reason: HOSPADM

## 2023-11-21 RX ORDER — LABETALOL HYDROCHLORIDE 5 MG/ML
10 INJECTION, SOLUTION INTRAVENOUS ONCE
Status: COMPLETED | OUTPATIENT
Start: 2023-11-21 | End: 2023-11-21

## 2023-11-21 RX ORDER — LIDOCAINE HYDROCHLORIDE 10 MG/ML
INJECTION, SOLUTION INFILTRATION; PERINEURAL PRN
Status: DISCONTINUED | OUTPATIENT
Start: 2023-11-21 | End: 2023-11-21 | Stop reason: HOSPADM

## 2023-11-21 RX ORDER — HEPARIN SODIUM 1000 [USP'U]/ML
INJECTION, SOLUTION INTRAVENOUS; SUBCUTANEOUS PRN
Status: DISCONTINUED | OUTPATIENT
Start: 2023-11-21 | End: 2023-11-21 | Stop reason: HOSPADM

## 2023-11-21 RX ORDER — ACETAMINOPHEN 325 MG/1
650 TABLET ORAL EVERY 4 HOURS PRN
Status: DISCONTINUED | OUTPATIENT
Start: 2023-11-21 | End: 2023-11-21 | Stop reason: HOSPADM

## 2023-11-21 RX ORDER — IBUPROFEN 600 MG/1
1 TABLET ORAL PRN
Status: DISCONTINUED | OUTPATIENT
Start: 2023-11-21 | End: 2023-11-23 | Stop reason: HOSPADM

## 2023-11-21 RX ORDER — SODIUM CHLORIDE 9 MG/ML
INJECTION, SOLUTION INTRAVENOUS CONTINUOUS
Status: DISCONTINUED | OUTPATIENT
Start: 2023-11-21 | End: 2023-11-23 | Stop reason: HOSPADM

## 2023-11-21 RX ORDER — CARVEDILOL 6.25 MG/1
6.25 TABLET ORAL 2 TIMES DAILY
Status: DISCONTINUED | OUTPATIENT
Start: 2023-11-21 | End: 2023-11-23 | Stop reason: HOSPADM

## 2023-11-21 RX ORDER — INSULIN LISPRO 100 [IU]/ML
0-4 INJECTION, SOLUTION INTRAVENOUS; SUBCUTANEOUS
Status: DISCONTINUED | OUTPATIENT
Start: 2023-11-21 | End: 2023-11-23 | Stop reason: HOSPADM

## 2023-11-21 RX ORDER — NITROGLYCERIN 0.4 MG/1
0.4 TABLET SUBLINGUAL EVERY 5 MIN PRN
Status: DISCONTINUED | OUTPATIENT
Start: 2023-11-21 | End: 2023-11-21 | Stop reason: HOSPADM

## 2023-11-21 RX ORDER — LAMOTRIGINE 100 MG/1
200 TABLET ORAL DAILY
Status: DISCONTINUED | OUTPATIENT
Start: 2023-11-21 | End: 2023-11-23 | Stop reason: HOSPADM

## 2023-11-21 RX ORDER — SODIUM CHLORIDE 9 MG/ML
INJECTION, SOLUTION INTRAVENOUS PRN
Status: DISCONTINUED | OUTPATIENT
Start: 2023-11-21 | End: 2023-11-21 | Stop reason: HOSPADM

## 2023-11-21 RX ORDER — SODIUM CHLORIDE 9 MG/ML
INJECTION, SOLUTION INTRAVENOUS CONTINUOUS
Status: DISCONTINUED | OUTPATIENT
Start: 2023-11-21 | End: 2023-11-21 | Stop reason: HOSPADM

## 2023-11-21 RX ORDER — VERAPAMIL HYDROCHLORIDE 2.5 MG/ML
INJECTION, SOLUTION INTRAVENOUS PRN
Status: DISCONTINUED | OUTPATIENT
Start: 2023-11-21 | End: 2023-11-21 | Stop reason: HOSPADM

## 2023-11-21 RX ORDER — ASPIRIN 81 MG/1
81 TABLET ORAL DAILY
Status: DISCONTINUED | OUTPATIENT
Start: 2023-11-22 | End: 2023-11-23 | Stop reason: HOSPADM

## 2023-11-21 RX ORDER — LISINOPRIL 40 MG/1
40 TABLET ORAL DAILY
Status: DISCONTINUED | OUTPATIENT
Start: 2023-11-22 | End: 2023-11-23 | Stop reason: HOSPADM

## 2023-11-21 RX ORDER — ATORVASTATIN CALCIUM 80 MG/1
80 TABLET, FILM COATED ORAL
Status: DISCONTINUED | OUTPATIENT
Start: 2023-11-22 | End: 2023-11-23 | Stop reason: HOSPADM

## 2023-11-21 RX ORDER — NITROGLYCERIN 20 MG/100ML
INJECTION INTRAVENOUS CONTINUOUS PRN
Status: COMPLETED | OUTPATIENT
Start: 2023-11-21 | End: 2023-11-21

## 2023-11-21 RX ORDER — DEXTROSE MONOHYDRATE 100 MG/ML
INJECTION, SOLUTION INTRAVENOUS CONTINUOUS PRN
Status: DISCONTINUED | OUTPATIENT
Start: 2023-11-21 | End: 2023-11-23 | Stop reason: HOSPADM

## 2023-11-21 RX ORDER — INSULIN LISPRO 100 [IU]/ML
0-4 INJECTION, SOLUTION INTRAVENOUS; SUBCUTANEOUS NIGHTLY
Status: DISCONTINUED | OUTPATIENT
Start: 2023-11-21 | End: 2023-11-23 | Stop reason: HOSPADM

## 2023-11-21 RX ORDER — AMLODIPINE BESYLATE 10 MG/1
10 TABLET ORAL DAILY
Status: DISCONTINUED | OUTPATIENT
Start: 2023-11-22 | End: 2023-11-23 | Stop reason: HOSPADM

## 2023-11-21 RX ORDER — LURASIDONE HYDROCHLORIDE 40 MG/1
20 TABLET, FILM COATED ORAL
Status: DISCONTINUED | OUTPATIENT
Start: 2023-11-21 | End: 2023-11-23 | Stop reason: HOSPADM

## 2023-11-21 RX ORDER — LABETALOL HYDROCHLORIDE 5 MG/ML
10 INJECTION, SOLUTION INTRAVENOUS
Status: DISCONTINUED | OUTPATIENT
Start: 2023-11-21 | End: 2023-11-21 | Stop reason: HOSPADM

## 2023-11-21 RX ADMIN — LURASIDONE HYDROCHLORIDE 20 MG: 40 TABLET, FILM COATED ORAL at 22:19

## 2023-11-21 RX ADMIN — LABETALOL HYDROCHLORIDE 10 MG: 5 INJECTION INTRAVENOUS at 13:26

## 2023-11-21 RX ADMIN — LAMOTRIGINE 200 MG: 100 TABLET ORAL at 22:06

## 2023-11-21 RX ADMIN — CARVEDILOL 6.25 MG: 6.25 TABLET, FILM COATED ORAL at 20:46

## 2023-11-21 RX ADMIN — SODIUM CHLORIDE: 9 INJECTION, SOLUTION INTRAVENOUS at 11:27

## 2023-11-21 RX ADMIN — SODIUM CHLORIDE: 9 INJECTION, SOLUTION INTRAVENOUS at 21:08

## 2023-11-21 NOTE — TELEPHONE ENCOUNTER
----- Message from Sav Babb PA-C sent at 11/21/2023  9:02 AM EST -----  Please let them know their echo looks good, will discuss at follow up appointment. Thanks.

## 2023-11-21 NOTE — PROGRESS NOTES
Pt Arrived to [facility] room 328 at this time from cath lab. Pt vital signs completed and pt oriented to room and use of call light.

## 2023-11-21 NOTE — PROGRESS NOTES
Patient is a 51year old from [facility] cath lab with Dr Raudel Harris transferring. Patient had a failed stress test 11/16/2023 and daily anginal symptoms. Outpatient cardiac cath performed today with findings of 99% distal RCA stenosis. Dr Chalo Nieves reviewed images and will do a coronary intervention on this patient. /95, HR 79, RR 15, 95% RA. Accepted in transfer under hospitalist to a telemetry bed.

## 2023-11-21 NOTE — PROGRESS NOTES
Report given to [facility]. [facility] staff receiving RN. All questions answered, last vitals given to RN. Patients ETA also reported.

## 2023-11-21 NOTE — PROGRESS NOTES
Pt taken by [facility] at this time for transport and report given to EMS staff. Writer called [facility] and updated on pt  ETA arrival time. Pt belongings in hand.

## 2023-11-22 LAB
ACTIVATED CLOTTING TIME: 260 SECONDS (ref 1–150)
ANION GAP SERPL CALC-SCNC: 11 MEQ/L (ref 8–16)
BASOPHILS ABSOLUTE: 0 THOU/MM3 (ref 0–0.1)
BASOPHILS NFR BLD AUTO: 0.4 %
BUN SERPL-MCNC: 10 MG/DL (ref 7–22)
CALCIUM SERPL-MCNC: 8.6 MG/DL (ref 8.5–10.5)
CHLORIDE SERPL-SCNC: 101 MEQ/L (ref 98–111)
CO2 SERPL-SCNC: 24 MEQ/L (ref 23–33)
CREAT SERPL-MCNC: 0.7 MG/DL (ref 0.4–1.2)
DEPRECATED RDW RBC AUTO: 39.1 FL (ref 35–45)
EKG ATRIAL RATE: 78 BPM
EKG P AXIS: 23 DEGREES
EKG P-R INTERVAL: 158 MS
EKG Q-T INTERVAL: 398 MS
EKG QRS DURATION: 108 MS
EKG QTC CALCULATION (BAZETT): 453 MS
EKG R AXIS: 17 DEGREES
EKG T AXIS: -50 DEGREES
EKG VENTRICULAR RATE: 78 BPM
EOSINOPHIL NFR BLD AUTO: 1.5 %
EOSINOPHILS ABSOLUTE: 0.1 THOU/MM3 (ref 0–0.4)
ERYTHROCYTE [DISTWIDTH] IN BLOOD BY AUTOMATED COUNT: 11.9 % (ref 11.5–14.5)
GFR SERPL CREATININE-BSD FRML MDRD: > 60 ML/MIN/1.73M2
GLUCOSE BLD STRIP.AUTO-MCNC: 166 MG/DL (ref 70–108)
GLUCOSE BLD STRIP.AUTO-MCNC: 198 MG/DL (ref 70–108)
GLUCOSE SERPL-MCNC: 151 MG/DL (ref 70–108)
HCT VFR BLD AUTO: 42.8 % (ref 42–52)
HGB BLD-MCNC: 14.6 GM/DL (ref 14–18)
IMM GRANULOCYTES # BLD AUTO: 0.03 THOU/MM3 (ref 0–0.07)
IMM GRANULOCYTES NFR BLD AUTO: 0.4 %
LYMPHOCYTES ABSOLUTE: 2 THOU/MM3 (ref 1–4.8)
LYMPHOCYTES NFR BLD AUTO: 29.2 %
MCH RBC QN AUTO: 30.7 PG (ref 26–33)
MCHC RBC AUTO-ENTMCNC: 34.1 GM/DL (ref 32.2–35.5)
MCV RBC AUTO: 90.1 FL (ref 80–94)
MONOCYTES ABSOLUTE: 0.8 THOU/MM3 (ref 0.4–1.3)
MONOCYTES NFR BLD AUTO: 11.5 %
NEUTROPHILS NFR BLD AUTO: 57 %
NRBC BLD AUTO-RTO: 0 /100 WBC
PLATELET # BLD AUTO: 186 THOU/MM3 (ref 130–400)
PMV BLD AUTO: 10.6 FL (ref 9.4–12.4)
POTASSIUM SERPL-SCNC: 4.7 MEQ/L (ref 3.5–5.2)
RBC # BLD AUTO: 4.75 MILL/MM3 (ref 4.7–6.1)
SEGMENTED NEUTROPHILS ABSOLUTE COUNT: 3.9 THOU/MM3 (ref 1.8–7.7)
SODIUM SERPL-SCNC: 136 MEQ/L (ref 135–145)
WBC # BLD AUTO: 6.8 THOU/MM3 (ref 4.8–10.8)

## 2023-11-22 PROCEDURE — 6360000002 HC RX W HCPCS: Performed by: INTERNAL MEDICINE

## 2023-11-22 PROCEDURE — 99255 IP/OBS CONSLTJ NEW/EST HI 80: CPT | Performed by: INTERNAL MEDICINE

## 2023-11-22 PROCEDURE — 6370000000 HC RX 637 (ALT 250 FOR IP): Performed by: HOSPITALIST

## 2023-11-22 PROCEDURE — 85025 COMPLETE CBC W/AUTO DIFF WBC: CPT

## 2023-11-22 PROCEDURE — 85347 COAGULATION TIME ACTIVATED: CPT

## 2023-11-22 PROCEDURE — 6370000000 HC RX 637 (ALT 250 FOR IP): Performed by: INTERNAL MEDICINE

## 2023-11-22 PROCEDURE — 93010 ELECTROCARDIOGRAM REPORT: CPT | Performed by: INTERNAL MEDICINE

## 2023-11-22 PROCEDURE — C1769 GUIDE WIRE: HCPCS | Performed by: INTERNAL MEDICINE

## 2023-11-22 PROCEDURE — 99152 MOD SED SAME PHYS/QHP 5/>YRS: CPT | Performed by: INTERNAL MEDICINE

## 2023-11-22 PROCEDURE — C9600 PERC DRUG-EL COR STENT SING: HCPCS | Performed by: INTERNAL MEDICINE

## 2023-11-22 PROCEDURE — 1200000003 HC TELEMETRY R&B

## 2023-11-22 PROCEDURE — 92928 PRQ TCAT PLMT NTRAC ST 1 LES: CPT | Performed by: INTERNAL MEDICINE

## 2023-11-22 PROCEDURE — C1887 CATHETER, GUIDING: HCPCS | Performed by: INTERNAL MEDICINE

## 2023-11-22 PROCEDURE — 82948 REAGENT STRIP/BLOOD GLUCOSE: CPT

## 2023-11-22 PROCEDURE — 36415 COLL VENOUS BLD VENIPUNCTURE: CPT

## 2023-11-22 PROCEDURE — 2580000003 HC RX 258: Performed by: INTERNAL MEDICINE

## 2023-11-22 PROCEDURE — C1874 STENT, COATED/COV W/DEL SYS: HCPCS | Performed by: INTERNAL MEDICINE

## 2023-11-22 PROCEDURE — 2500000003 HC RX 250 WO HCPCS: Performed by: INTERNAL MEDICINE

## 2023-11-22 PROCEDURE — 6370000000 HC RX 637 (ALT 250 FOR IP)

## 2023-11-22 PROCEDURE — 99153 MOD SED SAME PHYS/QHP EA: CPT | Performed by: INTERNAL MEDICINE

## 2023-11-22 PROCEDURE — 80048 BASIC METABOLIC PNL TOTAL CA: CPT

## 2023-11-22 PROCEDURE — C1894 INTRO/SHEATH, NON-LASER: HCPCS | Performed by: INTERNAL MEDICINE

## 2023-11-22 PROCEDURE — 2709999900 HC NON-CHARGEABLE SUPPLY: Performed by: INTERNAL MEDICINE

## 2023-11-22 PROCEDURE — 99233 SBSQ HOSP IP/OBS HIGH 50: CPT | Performed by: HOSPITALIST

## 2023-11-22 PROCEDURE — 027044Z DILATION OF CORONARY ARTERY, ONE ARTERY WITH DRUG-ELUTING INTRALUMINAL DEVICE, PERCUTANEOUS ENDOSCOPIC APPROACH: ICD-10-PCS | Performed by: HOSPITALIST

## 2023-11-22 PROCEDURE — 93005 ELECTROCARDIOGRAM TRACING: CPT | Performed by: INTERNAL MEDICINE

## 2023-11-22 PROCEDURE — 6360000004 HC RX CONTRAST MEDICATION: Performed by: INTERNAL MEDICINE

## 2023-11-22 DEVICE — STENT CORONARY ONYX FRONTIER RX 3X15 MM ZOTAROLIMUS ELUT: Type: IMPLANTABLE DEVICE | Status: FUNCTIONAL

## 2023-11-22 RX ORDER — SODIUM CHLORIDE 9 MG/ML
INJECTION, SOLUTION INTRAVENOUS PRN
Status: DISCONTINUED | OUTPATIENT
Start: 2023-11-22 | End: 2023-11-23 | Stop reason: HOSPADM

## 2023-11-22 RX ORDER — ACETAMINOPHEN 325 MG/1
650 TABLET ORAL EVERY 4 HOURS PRN
Status: DISCONTINUED | OUTPATIENT
Start: 2023-11-22 | End: 2023-11-23 | Stop reason: HOSPADM

## 2023-11-22 RX ORDER — FENTANYL CITRATE 50 UG/ML
INJECTION, SOLUTION INTRAMUSCULAR; INTRAVENOUS PRN
Status: DISCONTINUED | OUTPATIENT
Start: 2023-11-22 | End: 2023-11-22 | Stop reason: HOSPADM

## 2023-11-22 RX ORDER — SODIUM CHLORIDE 0.9 % (FLUSH) 0.9 %
5-40 SYRINGE (ML) INJECTION PRN
Status: DISCONTINUED | OUTPATIENT
Start: 2023-11-22 | End: 2023-11-23 | Stop reason: HOSPADM

## 2023-11-22 RX ORDER — SODIUM CHLORIDE 0.9 % (FLUSH) 0.9 %
5-40 SYRINGE (ML) INJECTION EVERY 12 HOURS SCHEDULED
Status: DISCONTINUED | OUTPATIENT
Start: 2023-11-22 | End: 2023-11-23 | Stop reason: HOSPADM

## 2023-11-22 RX ORDER — PHENYLEPHRINE HCL IN 0.9% NACL 1 MG/10 ML
VIAL (ML) INTRAVENOUS PRN
Status: DISCONTINUED | OUTPATIENT
Start: 2023-11-22 | End: 2023-11-22 | Stop reason: HOSPADM

## 2023-11-22 RX ORDER — ACETAMINOPHEN 325 MG/1
650 TABLET ORAL EVERY 4 HOURS PRN
Status: DISCONTINUED | OUTPATIENT
Start: 2023-11-22 | End: 2023-11-22

## 2023-11-22 RX ORDER — HEPARIN SODIUM 1000 [USP'U]/ML
INJECTION, SOLUTION INTRAVENOUS; SUBCUTANEOUS PRN
Status: DISCONTINUED | OUTPATIENT
Start: 2023-11-22 | End: 2023-11-22 | Stop reason: HOSPADM

## 2023-11-22 RX ORDER — SODIUM CHLORIDE 9 MG/ML
INJECTION, SOLUTION INTRAVENOUS CONTINUOUS
Status: DISCONTINUED | OUTPATIENT
Start: 2023-11-22 | End: 2023-11-23 | Stop reason: HOSPADM

## 2023-11-22 RX ORDER — MIDAZOLAM HYDROCHLORIDE 1 MG/ML
INJECTION INTRAMUSCULAR; INTRAVENOUS PRN
Status: DISCONTINUED | OUTPATIENT
Start: 2023-11-22 | End: 2023-11-22 | Stop reason: HOSPADM

## 2023-11-22 RX ORDER — ATROPINE SULFATE 1 MG/ML
INJECTION, SOLUTION INTRAVENOUS PRN
Status: DISCONTINUED | OUTPATIENT
Start: 2023-11-22 | End: 2023-11-22 | Stop reason: HOSPADM

## 2023-11-22 RX ADMIN — TICAGRELOR 90 MG: 90 TABLET ORAL at 22:02

## 2023-11-22 RX ADMIN — ATORVASTATIN CALCIUM 80 MG: 80 TABLET, FILM COATED ORAL at 17:35

## 2023-11-22 RX ADMIN — LISINOPRIL 40 MG: 40 TABLET ORAL at 10:44

## 2023-11-22 RX ADMIN — LURASIDONE HYDROCHLORIDE 20 MG: 40 TABLET, FILM COATED ORAL at 17:36

## 2023-11-22 RX ADMIN — CARVEDILOL 6.25 MG: 6.25 TABLET, FILM COATED ORAL at 22:02

## 2023-11-22 RX ADMIN — SODIUM CHLORIDE: 9 INJECTION, SOLUTION INTRAVENOUS at 17:59

## 2023-11-22 RX ADMIN — ASPIRIN 81 MG: 81 TABLET, COATED ORAL at 08:37

## 2023-11-22 RX ADMIN — LAMOTRIGINE 200 MG: 100 TABLET ORAL at 17:35

## 2023-11-22 RX ADMIN — AMLODIPINE BESYLATE 10 MG: 10 TABLET ORAL at 10:44

## 2023-11-22 RX ADMIN — ACETAMINOPHEN 650 MG: 325 TABLET ORAL at 11:29

## 2023-11-22 RX ADMIN — CARVEDILOL 6.25 MG: 6.25 TABLET, FILM COATED ORAL at 10:44

## 2023-11-22 ASSESSMENT — PAIN DESCRIPTION - LOCATION: LOCATION: HEAD

## 2023-11-22 ASSESSMENT — PAIN SCALES - GENERAL
PAINLEVEL_OUTOF10: 2
PAINLEVEL_OUTOF10: 0

## 2023-11-22 ASSESSMENT — PAIN - FUNCTIONAL ASSESSMENT: PAIN_FUNCTIONAL_ASSESSMENT: ACTIVITIES ARE NOT PREVENTED

## 2023-11-22 NOTE — CARE COORDINATION
Case Management Assessment  Initial Evaluation    Date/Time of Evaluation: 11/22/2023 12:06 PM  Assessment Completed by: Bruce Lucia RN    If patient is discharged prior to next notation, then this note serves as note for discharge by case management. Patient Name: Mary Ellen Mack                   YOB: 1975  Diagnosis: Coronary artery disease, occlusive [I25.10]                   Date / Time: 11/21/2023  5:46 PM  Location: [address] [place] [address]     Patient Admission Status: Inpatient   Readmission Risk Low 0-14, Mod 15-19), High > 20: Readmission Risk Score: 6.9    Current PCP: Twila Bazzi MD  PCP verified by CM? Yes    Chart Reviewed: Yes      History Provided by: Patient  Patient Orientation: Alert and Oriented    Patient Cognition: Alert    Hospitalization in the last 30 days (Readmission):  No    If yes, Readmission Assessment in CM Navigator will be completed. Advance Directives:      Code Status: Prior   Patient's Primary Decision Maker is: Patient Declined (Legal Next of [address] [place] [address] Drive as Decision Maker) (next of kin is mother, then sister)      Discharge Planning:    Patient lives with: Alone Type of Home: House  Primary Care Giver: Self  Patient Support Systems include: Family Members   Current Financial resources: Other (Comment) ([employer])  Current community resources: None  Current services prior to admission: None            Current DME:              Type of Home Care services:  None    ADLS  Prior functional level: Independent in ADLs/IADLs  Current functional level: Independent in ADLs/IADLs    Family can provide assistance at DC: No  Would you like Case Management to discuss the discharge plan with any other family members/significant others, and if so, who?  No  Plans to Return to Present Housing: Yes  Other Identified Issues/Barriers to RETURNING to current housing: none  Potential Assistance needed at discharge: N/A            Potential DME:    Patient expects to discharge to: [id number] [place] [address] for transportation at discharge: [address] [facility] / Plan: [address] [address] / Product Type: *No Product type* /     Does insurance require precert for SNF: Yes    Potential assistance Purchasing Medications: No  Meds-to-Beds request: Yes      [id number] - [place], [address] [phone] [address] [facility] [address]  Phone: [phone] Fax: [phone]      Notes:    Factors facilitating achievement of predicted outcomes: Cooperative and Pleasant    Barriers to discharge: Medical complications    Additional Case Management Notes: Pt transferred from [place] following a heart cath with recs for PCI. Admitted by hospitalist. PMHx HTN, DM II, HLD, and Bipolar II. Heart cath with stenting completed 11/22 through R radial. IVF. Monitoring post cath. Procedure:   11/22 Heart cath with successful stent placement. The Plan for Transition of Care is related to the following treatment goals of Coronary artery disease, occlusive [I25.10]    Patient Goals/Plan/Treatment Preferences: Zaheer Silvestre is from home alone. Current with PCP. He is an active  and works. He denies DME and HH needs. Transportation/Food Security/Housekeeping Addressed: No issues identified.      Zhang Diggs RN  Case Management Department

## 2023-11-22 NOTE — CONSULTS
test was performed. The patient reached stage 4 of the protocol and was stressed for 7 min and 35 sec. The patient reported chest pain, diaphoresis, dizziness, dyspnea, headaches and nausea during the stress test. Chest pain was characterized as aching and dull. The test was stopped because the patient experienced fatigue and dyspnea. Blood pressure demonstrated a normal response and heart rate demonstrated a normal response to stress. The patient's heart rate recovery was normal.    Stress ECG: The ECG was not diagnostic due to baseline ECG abnormality. Perfusion Defect: There is a mild severity left ventricular stress perfusion defect that is small in size present in the inferior segment(s). The defect appears to be probable ischemia. Stress Combined Conclusion: The study is most consistent with myocardial ischemia. Findings suggest a moderate risk of cardiac events. Stress Function: Left ventricular function post-stress is normal. Post-stress ejection fraction is 72%. The stress end diastolic cavity size is normal.    Perfusion Comments: Prone images were obtained. LV perfusion is probably abnormal. There is evidence of inducible ischemia. Perfusion Conclusion: There is no evidence of transient ischemic dilation (TID). TID ratio is 0.94. Cath:  Severe single vessel coronary artery disease involving a distal 99% stenosis in the right coronary artery. Normal left ventricular end diastolic pressure. Consult to interventional cardiology for consideration of angioplasty and/or stenting of the patients severe stenosis.        Past Medical History:    Past Medical History:   Diagnosis Date    Diabetes mellitus ([address])     Headache     IBS (irritable bowel syndrome)     Mixed hyperlipidemia     Primary hypertension        Past Surgical History:    Past Surgical History:   Procedure Laterality Date    CARDIAC CATHETERIZATION Left 11/21/2023    Dr Adriano Sparrow radial    CARDIAC PROCEDURE N/A Negative JVD  Radial Pulses: intact 2+  Abdomen: soft, non-tender, non-distended, normal bowel sounds, no masses or organomegaly  Extremities: no cyanosis, clubbing . No Edema  Musculoskeletal: normal range of motion, no joint swelling, deformity or tenderness      RADIOLOGY   XR CHEST PORTABLE    Result Date: 11/22/2023  PROCEDURE: XR CHEST PORTABLE CLINICAL INFORMATION: chest pain. Upper chest pain for a few weeks. COMPARISON: No prior study. TECHNIQUE: AP upright view of the chest. FINDINGS: The heart size is normal.The mediastinum is not widened. There are no pulmonary infiltrates or effusions. The pulmonary vascularity is normal.No suspicious osseous lesions are present. Normal chest. **This report has been created using voice recognition software. It may contain minor errors which are inherent in voice recognition technology. ** Final report electronically signed by Dr. Alexi Mathews on 11/22/2023 7:23 AM    Cardiac procedure    Result Date: 11/21/2023  - Coronary Angiography Brief Post Operative Note: Severe single vessel coronary artery disease involving a distal 99% stenosis in the right coronary artery. Normal left ventricular end diastolic pressure. Consult to interventional cardiology for consideration of angioplasty and/or stenting of the patients severe stenosis. Echo (TTE) complete (PRN contrast/bubble/strain/3D)    Result Date: 11/20/2023    Left Ventricle: Normal left ventricular systolic function with a visually estimated EF of 55 - 60%. EF by 2D Simpsons Biplane is 56%. Left ventricle size is normal. Mildly increased wall thickness. Normal diastolic function. Aortic Valve: Trace regurgitation. No previous studies were available for comparison. No significant cardiac cause of chest pain was identified from this study. LABS:  No results for input(s): \"CKTOTAL\", \"CKMB\", \"CKMBINDEX\", \"TROPONINT\" in the last 72 hours.   CBC:   Lab Results   Component Value Date/Time    WBC 6.8

## 2023-11-22 NOTE — PROGRESS NOTES
Hospitalist Progress Note      Patient:  Stas Otoole    Unit/Bed:8B-34/034-A  YOB: 1975  MRN: [id number]   Acct: [de-identified]   PCP: Becka Joyce MD  Date of Admission: 11/21/2023    Assessment/Plan:    CP/ Query UA vs CSA: Ongoing worsening angina/SOB X 6 weeks. Abnormal stress on 11/16. LHC 11/21 shows 99% occlusion of distal RCA. Tx here for intervention. Echocardiogram on 11/20 shows LVEF 55-60%. S/p PCI to RCA  Continue OMT; cardiology following  A1C from 11/15 at 6.9%, lipid panel sent     HX of Primary HTN: Home meds resumed; CCM and monitor      Hx of NIDDMII: Controlled with Glipizide at home. Hold Glipizide while IP  LDSSI with hypoglycemic protocol w/ BS fairly well-controlled  Carb control diet. HX of HLD: Continue high intensity statin. Lipid panel sent. Hx of Bipolar 2: Mood stable. Continue home meds w/ routine OP Psych F/U  Obesity w/ BMI 35.35          Chief Complaint: CP    Initial H and P:-    Admitted for evaluation and management of CP and NSTEMI. Cardiology already following. Subjective (past 24 hours):   Denies CP/SOB/fever/chills/cough/sputum/presyncope/palpitation/GI or  symptoms. Past medical history, family history, social history and allergies reviewed again and is unchanged since admission. ROS (All review of systems completed. Pertinent positives noted.  Otherwise All other systems reviewed and negative.)     Medications:  Reviewed    Infusion Medications    sodium chloride 75 mL/hr at 11/21/23 2104    dextrose       Scheduled Medications    amLODIPine  10 mg Oral Daily    aspirin  81 mg Oral Daily    atorvastatin  80 mg Oral Dinner    carvedilol  6.25 mg Oral BID    lamoTRIgine  200 mg Oral Daily    lisinopril  40 mg Oral Daily    lurasidone  20 mg Oral Dinner    insulin lispro  0-4 Units SubCUTAneous TID WC    insulin lispro  0-4 Units SubCUTAneous Nightly at 8:16 AM

## 2023-11-22 NOTE — H&P
[facility]  Sedation/Analgesia History & Physical    Pt Name: Vincent Forrester  Account number: [de-identified]  MRN: [id number]  YOB: 1975  Provider Performing Procedure: Erlnida Mackey MD MD  Referring Provider: Norman Knight MD   Primary Care Physician: Brian Adrian MD  Date: 11/22/2023    PRE-PROCEDURE    Code Status: FULL CODE  Brief History/Pre-Procedure Diagnosis:   Chest pain, UA, Abnormal stress test, CAD with 99% dRCA stenosis   Consent: : I have discussed with the patient risks, benefits, and alternatives (and relevant risks, benefits, and side effects related to alternatives or not receiving care), and likelihood of the success. The patient and/or representative appear to understand and agree to proceed. The discussion encompasses risks, benefits, and side effects related to the alternatives and the risks related to not receiving the proposed care, treatment, and services. The indication, risks and benefits of the procedure and possible therapeutic consequences and alternatives were discussed with the patient. The patient was given the opportunity to ask questions and to have them answered to his/her satisfaction. Risks of the procedure include but are not limited to the following: Bleeding, hematoma including retroperitoneal hemmorhage, infection, pain and discomfort, injury to the aorta and other blood vessels, rhythm disturbance, low blood pressure, myocardial infarction, stroke, kidney damage/failure, myocardial perforation, allergic reactions to sedatives/contrast material, loss of pulse/vascular compromise requiring surgery, aneurysm/pseudoaneurysm formation, possible loss of a limb/hand/leg, needing blood transfusion, requiring emergent open heart surgery or emergent coronary intervention, the need for intubation/respiratory support, the requirement for defibrillation/cardioversion, and death.  Alternatives to and omission of the suggested procedure were discussed. The patient had no further questions and wished to proceed; the consent form was signed. MEDICAL HISTORY  []ASHD/ANGINA/MI/CHF   []Hypertension  []Diabetes  []Hyperlipidemia  []Smoking  []Family Hx of ASHD  []Additional information:       has a past medical history of Diabetes mellitus ([address]), Headache, IBS (irritable bowel syndrome), Mixed hyperlipidemia, and Primary hypertension. SURGICAL HISTORY   has a past surgical history that includes hernia repair; Colonoscopy; Cardiac catheterization (Left, 11/21/2023); and Cardiac procedure (N/A, 11/21/2023).   Additional information:       ALLERGIES   Allergies as of 11/21/2023    (No Known Allergies)     Additional information:       MEDICATIONS   Aspirin  [] 81 mg  [] 325 mg  [] None  Antiplatelet drug therapy use last 5 days  []No []Yes  Coumadin Use Last 5 Days []No []Yes  Other anticoagulant use last 5 days  []No []Yes    Current Facility-Administered Medications:     0.9 % sodium chloride infusion, , IntraVENous, Continuous, Acopine, Abdoul Rufina, APRN - CNP, Last Rate: 75 mL/hr at 11/21/23 2108, New Bag at 11/21/23 2108    amLODIPine (NORVASC) tablet 10 mg, 10 mg, Oral, Daily, Diegomeparth Sandra A, APRN - CNP    aspirin EC tablet 81 mg, 81 mg, Oral, Daily, Longmeier, Sandra A, APRN - CNP, 81 mg at 11/22/23 0837    atorvastatin (LIPITOR) tablet 80 mg, 80 mg, Oral, Dinner, Longmeparth Sandra A, APRN - CNP    carvedilol (COREG) tablet 6.25 mg, 6.25 mg, Oral, BID, Longmeparth, Sandra A, APRN - CNP, 6.25 mg at 11/21/23 2046    lamoTRIgine (LAMICTAL) tablet 200 mg, 200 mg, Oral, Daily, Longmeparth Sandra A, APRN - CNP, 200 mg at 11/21/23 2206    lisinopril (PRINIVIL;ZESTRIL) tablet 40 mg, 40 mg, Oral, Daily, Longmeparth Sandra A, APRN - CNP    lurasidone (LATUDA) tablet 20 mg, 20 mg, Oral, Dinner, Longmeier, Sandra A, DAVID - CNP, 20 mg at 11/21/23 1006    insulin lispro (HUMALOG) injection vial 0-4 Units, 0-4 Units, SubCUTAneous, TID WC, Sandra Mccray, APRN -

## 2023-11-22 NOTE — H&P
Hospitalist History & Physical    Patient:  Iris Patterson    Unit/Bed:8B-34/034-A  YOB: 1975  MRN: [id number]   Acct: [de-identified]   PCP: Krista Cockayne, MD  Code Status: Prior    Date of Service: Pt seen/examined on 11/21/23 and admitted to Inpatient with expected LOS greater than two midnights due to medical therapy. Chief Complaint: Chest pain    Assessment/Plan:    CAD: Ongoing worsening angina/SOB X 6 weeks. Abnormal stress on 11/16. [facility] 11/21 shows 99% occlusion of distal RCA. Tx here for intervention. Echocardiogram on 11/20 shows LVEF 55-60%. NPO at MN  No need for Heparin gtt per cardiology. Continue daily ASA    Continue Statin. Primary HTN: Elevated on arrival.  165/92. Continue home Amlodipine, Coreg, Lisinopril with hold parameters. NIDDMII: Controlled with Glipizide at home. Hold Glipizide while IP  LDSSI with hypoglycemic protocol. Carb control diet. HLD: Continue high intensity statin. Bipolar 2: Mood stable. Continue home Latuda and Lamictal.        History of Present Illness:  Iris Patterson is a 51 y.o. male with PMHx of HTN, DM2, HLD, Bipolar 2, who presented to [facility] from [place] with chief complaint of ongoing anginal symptoms. He reports that over the last 6-7 weeks, he would develop chest pain that radiated to both shoulders and down his left arm. He would have associated lightheadedness and nausea. The pain would resolve with rest. On 11/16, the patient underwent cardiac stress test which was abnormal.  He underwent a cardiac catheterization today at [facility] which showed 99% occlusion of the distal RCA. He was transferred to [facility] for further intervention. At the time of my exam, the patient is resting comfortably in bed. No fever, cough, chills. Denies chest pain or shortness of breath at this time. No abdominal pain, nausea or vomiting. No unusual swelling or weight gain.         Review of Systems: Pertinent positives as noted in the HPI. All other systems reviewed and negative. Past Medical History:        Diagnosis Date    Diabetes mellitus ([address])     Headache     IBS (irritable bowel syndrome)     Mixed hyperlipidemia     Primary hypertension        Past Surgical History:        Procedure Laterality Date    CARDIAC CATHETERIZATION Left 11/21/2023    Dr Freida Urban radial    CARDIAC PROCEDURE N/A 11/21/2023    Left heart cath / coronary angiography performed by Tyra Joshi MD at [address] [facility] Medications:   No current facility-administered medications on file prior to encounter. Current Outpatient Medications on File Prior to Encounter   Medication Sig Dispense Refill    lisinopril (PRINIVIL;ZESTRIL) 40 MG tablet Take 1 tablet by mouth daily 90 tablet 3    carvedilol (COREG) 6.25 MG tablet Take 1 tablet by mouth 2 times daily 180 tablet 3    aspirin 81 MG EC tablet Take 1 tablet by mouth daily      glipiZIDE (GLUCOTROL) 5 MG tablet Take 1 tablet by mouth 2 times daily 60 tablet 3    lurasidone (LATUDA) 20 MG TABS tablet Take 1 tablet by mouth Daily with supper 90 tablet 0    lamoTRIgine (LAMICTAL) 200 MG tablet Take 1 tablet by mouth daily 90 tablet 0    amLODIPine (NORVASC) 10 MG tablet Take 1 tablet by mouth daily 90 tablet 3    atorvastatin (LIPITOR) 80 MG tablet Take 1 tablet by mouth daily (Patient taking differently: Take 1 tablet by mouth Daily with supper) 90 tablet 3    blood glucose monitor kit and supplies Dispense sufficient amount for indicated testing frequency plus additional to accommodate PRN testing needs. Dispense all needed supplies to include: monitor, strips, lancing device, lancets, control solutions, alcohol swabs. 1 kit 0       Allergies:    Patient has no known allergies. Social History:    reports that he has quit smoking. His smoking use included cigarettes. His smokeless tobacco use includes chew.  He

## 2023-11-23 VITALS
DIASTOLIC BLOOD PRESSURE: 82 MMHG | RESPIRATION RATE: 18 BRPM | HEART RATE: 77 BPM | TEMPERATURE: 97.9 F | SYSTOLIC BLOOD PRESSURE: 139 MMHG | OXYGEN SATURATION: 96 %

## 2023-11-23 LAB
ANION GAP SERPL CALC-SCNC: 11 MEQ/L (ref 8–16)
BASOPHILS ABSOLUTE: 0 THOU/MM3 (ref 0–0.1)
BASOPHILS NFR BLD AUTO: 0.3 %
BUN SERPL-MCNC: 10 MG/DL (ref 7–22)
CALCIUM SERPL-MCNC: 8.8 MG/DL (ref 8.5–10.5)
CHLORIDE SERPL-SCNC: 102 MEQ/L (ref 98–111)
CHOLEST SERPL-MCNC: 179 MG/DL (ref 100–199)
CO2 SERPL-SCNC: 23 MEQ/L (ref 23–33)
CREAT SERPL-MCNC: 0.7 MG/DL (ref 0.4–1.2)
DEPRECATED RDW RBC AUTO: 40 FL (ref 35–45)
EKG ATRIAL RATE: 80 BPM
EKG P AXIS: 22 DEGREES
EKG P-R INTERVAL: 142 MS
EKG Q-T INTERVAL: 392 MS
EKG QRS DURATION: 104 MS
EKG QTC CALCULATION (BAZETT): 452 MS
EKG R AXIS: 9 DEGREES
EKG T AXIS: -166 DEGREES
EKG VENTRICULAR RATE: 80 BPM
EOSINOPHIL NFR BLD AUTO: 1.3 %
EOSINOPHILS ABSOLUTE: 0.1 THOU/MM3 (ref 0–0.4)
ERYTHROCYTE [DISTWIDTH] IN BLOOD BY AUTOMATED COUNT: 11.9 % (ref 11.5–14.5)
GFR SERPL CREATININE-BSD FRML MDRD: > 60 ML/MIN/1.73M2
GLUCOSE SERPL-MCNC: 157 MG/DL (ref 70–108)
HCT VFR BLD AUTO: 44 % (ref 42–52)
HDLC SERPL-MCNC: 33 MG/DL
HGB BLD-MCNC: 15 GM/DL (ref 14–18)
IMM GRANULOCYTES # BLD AUTO: 0.05 THOU/MM3 (ref 0–0.07)
IMM GRANULOCYTES NFR BLD AUTO: 0.5 %
LDLC SERPL CALC-MCNC: 88 MG/DL
LYMPHOCYTES ABSOLUTE: 2 THOU/MM3 (ref 1–4.8)
LYMPHOCYTES NFR BLD AUTO: 21.4 %
MCH RBC QN AUTO: 31.3 PG (ref 26–33)
MCHC RBC AUTO-ENTMCNC: 34.1 GM/DL (ref 32.2–35.5)
MCV RBC AUTO: 91.7 FL (ref 80–94)
MONOCYTES ABSOLUTE: 0.9 THOU/MM3 (ref 0.4–1.3)
MONOCYTES NFR BLD AUTO: 10.2 %
NEUTROPHILS NFR BLD AUTO: 66.3 %
NRBC BLD AUTO-RTO: 0 /100 WBC
PLATELET # BLD AUTO: 190 THOU/MM3 (ref 130–400)
PMV BLD AUTO: 10.6 FL (ref 9.4–12.4)
POTASSIUM SERPL-SCNC: 4.2 MEQ/L (ref 3.5–5.2)
RBC # BLD AUTO: 4.8 MILL/MM3 (ref 4.7–6.1)
SEGMENTED NEUTROPHILS ABSOLUTE COUNT: 6.1 THOU/MM3 (ref 1.8–7.7)
SODIUM SERPL-SCNC: 136 MEQ/L (ref 135–145)
TRIGL SERPL-MCNC: 290 MG/DL (ref 0–199)
WBC # BLD AUTO: 9.2 THOU/MM3 (ref 4.8–10.8)

## 2023-11-23 PROCEDURE — 80048 BASIC METABOLIC PNL TOTAL CA: CPT

## 2023-11-23 PROCEDURE — 99222 1ST HOSP IP/OBS MODERATE 55: CPT | Performed by: NURSE PRACTITIONER

## 2023-11-23 PROCEDURE — 6370000000 HC RX 637 (ALT 250 FOR IP): Performed by: INTERNAL MEDICINE

## 2023-11-23 PROCEDURE — 2580000003 HC RX 258: Performed by: INTERNAL MEDICINE

## 2023-11-23 PROCEDURE — 93010 ELECTROCARDIOGRAM REPORT: CPT | Performed by: INTERNAL MEDICINE

## 2023-11-23 PROCEDURE — 36415 COLL VENOUS BLD VENIPUNCTURE: CPT

## 2023-11-23 PROCEDURE — 85025 COMPLETE CBC W/AUTO DIFF WBC: CPT

## 2023-11-23 PROCEDURE — 99239 HOSP IP/OBS DSCHRG MGMT >30: CPT | Performed by: HOSPITALIST

## 2023-11-23 PROCEDURE — 6370000000 HC RX 637 (ALT 250 FOR IP)

## 2023-11-23 PROCEDURE — 80061 LIPID PANEL: CPT

## 2023-11-23 PROCEDURE — 6370000000 HC RX 637 (ALT 250 FOR IP): Performed by: HOSPITALIST

## 2023-11-23 RX ORDER — LOPERAMIDE HYDROCHLORIDE 2 MG/1
2 CAPSULE ORAL 4 TIMES DAILY PRN
Status: DISCONTINUED | OUTPATIENT
Start: 2023-11-23 | End: 2023-11-23 | Stop reason: HOSPADM

## 2023-11-23 RX ADMIN — AMLODIPINE BESYLATE 10 MG: 10 TABLET ORAL at 07:26

## 2023-11-23 RX ADMIN — ASPIRIN 81 MG: 81 TABLET, COATED ORAL at 07:30

## 2023-11-23 RX ADMIN — CARVEDILOL 6.25 MG: 6.25 TABLET, FILM COATED ORAL at 07:26

## 2023-11-23 RX ADMIN — TICAGRELOR 90 MG: 90 TABLET ORAL at 07:30

## 2023-11-23 RX ADMIN — SODIUM CHLORIDE, PRESERVATIVE FREE 10 ML: 5 INJECTION INTRAVENOUS at 07:27

## 2023-11-23 RX ADMIN — LOPERAMIDE HYDROCHLORIDE 2 MG: 2 CAPSULE ORAL at 09:24

## 2023-11-23 RX ADMIN — LISINOPRIL 40 MG: 40 TABLET ORAL at 07:26

## 2023-11-23 ASSESSMENT — PAIN SCALES - GENERAL: PAINLEVEL_OUTOF10: 0

## 2023-11-23 NOTE — PLAN OF CARE
Problem: Discharge Planning  Goal: Discharge to home or other facility with appropriate resources  Outcome: Progressing  Flowsheets (Taken 11/23/2023 0008)  Discharge to home or other facility with appropriate resources: Identify barriers to discharge with patient and caregiver     Problem: Chronic Conditions and Co-morbidities  Goal: Patient's chronic conditions and co-morbidity symptoms are monitored and maintained or improved  Outcome: Progressing  Flowsheets (Taken 11/23/2023 0008)  Care Plan - Patient's Chronic Conditions and Co-Morbidity Symptoms are Monitored and Maintained or Improved: Monitor and assess patient's chronic conditions and comorbid symptoms for stability, deterioration, or improvement     Problem: Pain  Goal: Verbalizes/displays adequate comfort level or baseline comfort level  Outcome: Progressing  Flowsheets (Taken 11/23/2023 0008)  Verbalizes/displays adequate comfort level or baseline comfort level:   Encourage patient to monitor pain and request assistance   Assess pain using appropriate pain scale     Problem: Cardiovascular - Adult  Goal: Maintains optimal cardiac output and hemodynamic stability  Outcome: Progressing  Flowsheets (Taken 11/23/2023 0008)  Maintains optimal cardiac output and hemodynamic stability: Monitor blood pressure and heart rate     Problem: Metabolic/Fluid and Electrolytes - Adult  Goal: Glucose maintained within prescribed range  Outcome: Progressing  Flowsheets (Taken 11/23/2023 0008)  Glucose maintained within prescribed range:   Monitor blood glucose as ordered   Assess for signs and symptoms of hyperglycemia and hypoglycemia     Care plan reviewed with patient. Patient verbalizes understanding of the plan of care and contribute to goal setting.

## 2023-11-23 NOTE — DISCHARGE INSTRUCTIONS
Follow-up in 2 weeks with Dr. Germán Dye. Continue all medications as prescribed. Discharge Instructions for Radial Heart Catherization    1. Take it easy for 3-4 days. 2.  No driving for 2 days. 3.  No lifting of 5 lbs or more for 5 days with the affected arm. 4.  Can shower after 24 hours. 5.  Remove arm board after 24 hours. 6.  Apply a band aid to the insertion site daily for 5 days. May apply antibiotic ointment if desired, but not necessary. Wash site daily with soap and water. 7.  No creams, ointments, or powders near the insertion site. 8.  No tub baths, swimming, hot tubs, or hand washing dishes for 1 week. 9.  Watch for signs of infection (redness, warmth, swelling, or pus drainage) or coolness of extremity and call physician if this occurs  10. If bleeding occurs from insertion site, apply pressure and call 911.      Watch for numbness/tingling - if this occurs go to ER ASAP  Apply ice 15min with hour break in between and alternate with heat compress for 15 min to reduce swelling for 3-5 days

## 2023-11-23 NOTE — PROGRESS NOTES
Patient discharged home. Educated on discharge instructions, follow ups and medications. No questions or concerns voiced. Patient received pamphlet about cardiac intervention, how to take care of the incision site, mended hearts program, cardiac rehab and the hours of operations, and Nutritional information regarding cardiac diet. MI teaching done reviewing causes, signs symptoms, and risk factors.

## 2023-11-24 NOTE — PROGRESS NOTES
Inpatient Cardiac Rehabilitation Consult    Received consult for Phase II Cardiac Rehabilitation. Patient needs cardiac rehab due to PCI on 11/22/23. Patient discharged prior to being seen inpatient. Call placed to patient to go over cardiac rehab education and if patient would like referral sent to a closer location, voicemail left for patient to return call.

## 2023-12-06 ENCOUNTER — OFFICE VISIT (OUTPATIENT)
Dept: PRIMARY CARE CLINIC | Age: 48
End: 2023-12-06
Payer: COMMERCIAL

## 2023-12-06 ENCOUNTER — OFFICE VISIT (OUTPATIENT)
Dept: CARDIOLOGY | Age: 48
End: 2023-12-06
Payer: COMMERCIAL

## 2023-12-06 VITALS
HEART RATE: 83 BPM | RESPIRATION RATE: 18 BRPM | WEIGHT: 271 LBS | BODY MASS INDEX: 35.92 KG/M2 | SYSTOLIC BLOOD PRESSURE: 127 MMHG | OXYGEN SATURATION: 97 % | HEIGHT: 73 IN | DIASTOLIC BLOOD PRESSURE: 76 MMHG

## 2023-12-06 VITALS
BODY MASS INDEX: 36.7 KG/M2 | WEIGHT: 271 LBS | HEART RATE: 90 BPM | DIASTOLIC BLOOD PRESSURE: 76 MMHG | HEIGHT: 72 IN | SYSTOLIC BLOOD PRESSURE: 124 MMHG | OXYGEN SATURATION: 96 %

## 2023-12-06 DIAGNOSIS — E78.2 MIXED HYPERLIPIDEMIA: ICD-10-CM

## 2023-12-06 DIAGNOSIS — R00.2 PALPITATIONS: ICD-10-CM

## 2023-12-06 DIAGNOSIS — Z09 HOSPITAL DISCHARGE FOLLOW-UP: Primary | ICD-10-CM

## 2023-12-06 DIAGNOSIS — Z71.6 TOBACCO ABUSE COUNSELING: ICD-10-CM

## 2023-12-06 DIAGNOSIS — E11.9 TYPE 2 DIABETES MELLITUS WITHOUT COMPLICATION, WITHOUT LONG-TERM CURRENT USE OF INSULIN (HCC): ICD-10-CM

## 2023-12-06 DIAGNOSIS — E11.69 TYPE 2 DIABETES MELLITUS WITH OTHER SPECIFIED COMPLICATION, UNSPECIFIED WHETHER LONG TERM INSULIN USE (HCC): ICD-10-CM

## 2023-12-06 DIAGNOSIS — R94.39 ABNORMAL STRESS TEST: ICD-10-CM

## 2023-12-06 DIAGNOSIS — I10 PRIMARY HYPERTENSION: ICD-10-CM

## 2023-12-06 DIAGNOSIS — E66.9 CLASS 2 OBESITY WITH BODY MASS INDEX (BMI) OF 35.0 TO 35.9 IN ADULT, UNSPECIFIED OBESITY TYPE, UNSPECIFIED WHETHER SERIOUS COMORBIDITY PRESENT: ICD-10-CM

## 2023-12-06 DIAGNOSIS — Z95.5 HISTORY OF HEART ARTERY STENT: ICD-10-CM

## 2023-12-06 DIAGNOSIS — E78.5 HYPERLIPIDEMIA, UNSPECIFIED HYPERLIPIDEMIA TYPE: ICD-10-CM

## 2023-12-06 DIAGNOSIS — R06.02 SOB (SHORTNESS OF BREATH): ICD-10-CM

## 2023-12-06 DIAGNOSIS — Z82.49 FAMILY HISTORY OF HEART DISEASE: ICD-10-CM

## 2023-12-06 DIAGNOSIS — I25.10 CORONARY ARTERY DISEASE INVOLVING NATIVE CORONARY ARTERY OF NATIVE HEART WITHOUT ANGINA PECTORIS: ICD-10-CM

## 2023-12-06 PROCEDURE — 3044F HG A1C LEVEL LT 7.0%: CPT | Performed by: PHYSICIAN ASSISTANT

## 2023-12-06 PROCEDURE — G8417 CALC BMI ABV UP PARAM F/U: HCPCS | Performed by: PHYSICIAN ASSISTANT

## 2023-12-06 PROCEDURE — 1111F DSCHRG MED/CURRENT MED MERGE: CPT | Performed by: PHYSICIAN ASSISTANT

## 2023-12-06 PROCEDURE — 99215 OFFICE O/P EST HI 40 MIN: CPT | Performed by: FAMILY MEDICINE

## 2023-12-06 PROCEDURE — 3074F SYST BP LT 130 MM HG: CPT | Performed by: FAMILY MEDICINE

## 2023-12-06 PROCEDURE — 1111F DSCHRG MED/CURRENT MED MERGE: CPT | Performed by: FAMILY MEDICINE

## 2023-12-06 PROCEDURE — 2022F DILAT RTA XM EVC RTNOPTHY: CPT | Performed by: PHYSICIAN ASSISTANT

## 2023-12-06 PROCEDURE — 3078F DIAST BP <80 MM HG: CPT | Performed by: FAMILY MEDICINE

## 2023-12-06 PROCEDURE — 3074F SYST BP LT 130 MM HG: CPT | Performed by: PHYSICIAN ASSISTANT

## 2023-12-06 PROCEDURE — 4004F PT TOBACCO SCREEN RCVD TLK: CPT | Performed by: PHYSICIAN ASSISTANT

## 2023-12-06 PROCEDURE — 99214 OFFICE O/P EST MOD 30 MIN: CPT | Performed by: PHYSICIAN ASSISTANT

## 2023-12-06 PROCEDURE — 3078F DIAST BP <80 MM HG: CPT | Performed by: PHYSICIAN ASSISTANT

## 2023-12-06 PROCEDURE — G8427 DOCREV CUR MEDS BY ELIG CLIN: HCPCS | Performed by: FAMILY MEDICINE

## 2023-12-06 PROCEDURE — 2022F DILAT RTA XM EVC RTNOPTHY: CPT | Performed by: FAMILY MEDICINE

## 2023-12-06 PROCEDURE — 3044F HG A1C LEVEL LT 7.0%: CPT | Performed by: FAMILY MEDICINE

## 2023-12-06 PROCEDURE — 4004F PT TOBACCO SCREEN RCVD TLK: CPT | Performed by: FAMILY MEDICINE

## 2023-12-06 PROCEDURE — G8427 DOCREV CUR MEDS BY ELIG CLIN: HCPCS | Performed by: PHYSICIAN ASSISTANT

## 2023-12-06 PROCEDURE — G8484 FLU IMMUNIZE NO ADMIN: HCPCS | Performed by: PHYSICIAN ASSISTANT

## 2023-12-06 PROCEDURE — G8484 FLU IMMUNIZE NO ADMIN: HCPCS | Performed by: FAMILY MEDICINE

## 2023-12-06 PROCEDURE — G8417 CALC BMI ABV UP PARAM F/U: HCPCS | Performed by: FAMILY MEDICINE

## 2023-12-06 RX ORDER — EZETIMIBE 10 MG/1
10 TABLET ORAL DAILY
Qty: 90 TABLET | Refills: 1 | Status: SHIPPED | OUTPATIENT
Start: 2023-12-06

## 2023-12-06 NOTE — PROGRESS NOTES
Maximo Powers am scribing for and in the presence of Dora Plascencia PA-C. Patient: Silvestre Murillo  : 1975  Date of Visit: 2023    REASON FOR VISIT / CONSULTATION: Coronary Artery Disease (Hx:HTN,HLD,SOB,Fam Hx,Tobacco Abuse. Pt is here to follow up after his stenting after his heart cath. Pt had one stent. BP has gone done and headaches have gone. He feels better, no CP, light headed/dizziness, SOB, palps. )        History of Present Illness:        Dear Dania Ayala MD    I had the pleasure of seeing Silvestre Murillo in my office today. Mr. Rhodia Aase is a 51 y.o. male with a history of difficult to control hypertension. He had stress test 18-20 years ago which led to pulmonology and that since has been resolved. He does have family history of heart disease in both parents, father with an open heart surgery and mother with stents x 4.. Treadmill stress test done on 2021 [facility] treadmill score is 2 which correlates with intermediate risk. He smokes cigars, it varies but maybe about least twice a week, since college. EKG done in office on 2023 showed normal sinus rhythm. Stress test completed on 2023: Treadmill Myoview stress test conducted per protocol. Baseline ECG showed sinus rhythm with ST segment and T wave abnormalities in the inferolateral leads that precludes accurate ECG interpretation during exercise. Exercise duration was 7 minutes and 35 seconds. Test terminated due to shortness of breath and leg fatigue no chest pain. Myocardial perfusion is mildly abnormal with a small inferior wall defect of mild intensity, probably  consistent with a stress-induced ischemia. Summed stress score is only 2. Heart cath and stent placed on 23: Dist RCA lesion, 99% stenosed. Mr. Rhodia Aase is here today to follow up after his heart cath and stenting. He had one stent placed and he reports he is feeling a lot better since then. He goes back to work tomorrow.  He

## 2023-12-06 NOTE — PATIENT INSTRUCTIONS
SURVEY:    You may be receiving a survey from [employer] regarding your visit today. Please complete the survey to enable us to provide the highest quality of care to you and your family. If you cannot score us a very good on any question, please call the office to discuss how we could have made your experience a very good one. Thank you.

## 2023-12-06 NOTE — PROGRESS NOTES
lancets, control solutions, alcohol swabs. carvedilol 6.25 MG tablet  Commonly known as: COREG  Take 1 tablet by mouth 2 times daily     glipiZIDE 5 MG tablet  Commonly known as: GLUCOTROL  Take 1 tablet by mouth 2 times daily     lamoTRIgine 200 MG tablet  Commonly known as: LAMICTAL  Take 1 tablet by mouth daily     lisinopril 40 MG tablet  Commonly known as: PRINIVIL;ZESTRIL  Take 1 tablet by mouth daily     lurasidone 20 MG Tabs tablet  Commonly known as: LATUDA  Take 1 tablet by mouth Daily with supper     * ticagrelor 90 MG Tabs tablet  Commonly known as: BRILINTA  Take 1 tablet by mouth 2 times daily     * ticagrelor 90 MG Tabs tablet  Commonly known as: Brilinta  Take 1 tablet by mouth 2 times daily for 2 doses           * This list has 2 medication(s) that are the same as other medications prescribed for you. Read the directions carefully, and ask your doctor or other care provider to review them with you.                     Medications marked \"taking\" at this time  Outpatient Medications Marked as Taking for the 12/6/23 encounter (Office Visit) with Cheryl Leblanc MD   Medication Sig Dispense Refill    ticagrelor (BRILINTA) 90 MG TABS tablet Take 1 tablet by mouth 2 times daily 60 tablet 3    lisinopril (PRINIVIL;ZESTRIL) 40 MG tablet Take 1 tablet by mouth daily 90 tablet 3    carvedilol (COREG) 6.25 MG tablet Take 1 tablet by mouth 2 times daily 180 tablet 3    aspirin 81 MG EC tablet Take 1 tablet by mouth daily      glipiZIDE (GLUCOTROL) 5 MG tablet Take 1 tablet by mouth 2 times daily 60 tablet 3    lurasidone (LATUDA) 20 MG TABS tablet Take 1 tablet by mouth Daily with supper 90 tablet 0    lamoTRIgine (LAMICTAL) 200 MG tablet Take 1 tablet by mouth daily 90 tablet 0    amLODIPine (NORVASC) 10 MG tablet Take 1 tablet by mouth daily 90 tablet 3    atorvastatin (LIPITOR) 80 MG tablet Take 1 tablet by mouth daily (Patient taking differently: Take 1 tablet by mouth Daily with supper) 90 tablet

## 2023-12-11 RX ORDER — ATORVASTATIN CALCIUM 80 MG/1
80 TABLET, FILM COATED ORAL DAILY
Qty: 90 TABLET | Refills: 1 | Status: SHIPPED | OUTPATIENT
Start: 2023-12-11

## 2024-01-22 ENCOUNTER — TELEPHONE (OUTPATIENT)
Dept: CARDIOLOGY | Age: 49
End: 2024-01-22

## 2024-01-22 DIAGNOSIS — Z95.5 HISTORY OF HEART ARTERY STENT: ICD-10-CM

## 2024-01-22 DIAGNOSIS — I25.10 CORONARY ARTERY DISEASE INVOLVING NATIVE CORONARY ARTERY OF NATIVE HEART WITHOUT ANGINA PECTORIS: Primary | ICD-10-CM

## 2024-01-22 NOTE — TELEPHONE ENCOUNTER
Mr. Barr called into the office today and stated that his Brilinta was to expensive for him when he went to go  at the pharmacy. He did use the coupon for the first time use however we no longer are getting samples and he was wondering what else he could take instead.     Please advise.   Thanks!

## 2024-01-23 RX ORDER — CLOPIDOGREL BISULFATE 75 MG/1
75 TABLET ORAL DAILY
Qty: 90 TABLET | Refills: 3 | Status: SHIPPED | OUTPATIENT
Start: 2024-01-23

## 2024-01-23 NOTE — TELEPHONE ENCOUNTER
LVM to let Mr. Barr know that at this time per Sofia we will STOP his Brilinta and switch him to Plavix 75 mg daily. Med pended to sign and med list updated.     Thanks!

## 2024-02-16 ENCOUNTER — OFFICE VISIT (OUTPATIENT)
Dept: PRIMARY CARE CLINIC | Age: 49
End: 2024-02-16

## 2024-02-16 ENCOUNTER — HOSPITAL ENCOUNTER (OUTPATIENT)
Age: 49
Discharge: HOME | End: 2024-02-16
Payer: COMMERCIAL

## 2024-02-16 VITALS
HEART RATE: 75 BPM | DIASTOLIC BLOOD PRESSURE: 76 MMHG | SYSTOLIC BLOOD PRESSURE: 118 MMHG | WEIGHT: 267 LBS | BODY MASS INDEX: 36.16 KG/M2 | HEIGHT: 72 IN | OXYGEN SATURATION: 96 %

## 2024-02-16 DIAGNOSIS — E78.2 MIXED HYPERLIPIDEMIA: ICD-10-CM

## 2024-02-16 DIAGNOSIS — E11.9 TYPE 2 DIABETES MELLITUS WITHOUT COMPLICATION, WITHOUT LONG-TERM CURRENT USE OF INSULIN (HCC): Primary | ICD-10-CM

## 2024-02-16 DIAGNOSIS — E11.9 TYPE 2 DIABETES MELLITUS WITHOUT COMPLICATION, WITHOUT LONG-TERM CURRENT USE OF INSULIN (HCC): ICD-10-CM

## 2024-02-16 DIAGNOSIS — F31.81 BIPOLAR 2 DISORDER (HCC): ICD-10-CM

## 2024-02-16 DIAGNOSIS — I10 PRIMARY HYPERTENSION: ICD-10-CM

## 2024-02-16 LAB
ALBUMIN SERPL-MCNC: 4.5 G/DL (ref 3.5–5.2)
ALBUMIN/GLOB SERPL: 1.5 {RATIO} (ref 1–2.5)
ALP SERPL-CCNC: 63 U/L (ref 40–129)
ALT SERPL-CCNC: 55 U/L (ref 5–41)
ANION GAP SERPL CALCULATED.3IONS-SCNC: 10 MMOL/L (ref 9–17)
AST SERPL-CCNC: 27 U/L
BASOPHILS # BLD: 0.04 K/UL (ref 0–0.2)
BASOPHILS NFR BLD: 1 % (ref 0–2)
BILIRUB SERPL-MCNC: 0.4 MG/DL (ref 0.3–1.2)
BUN SERPL-MCNC: 10 MG/DL (ref 6–20)
BUN/CREAT SERPL: 17 (ref 9–20)
CALCIUM SERPL-MCNC: 9.5 MG/DL (ref 8.6–10.4)
CHLORIDE SERPL-SCNC: 102 MMOL/L (ref 98–107)
CHOLEST SERPL-MCNC: 153 MG/DL (ref 0–199)
CHOLESTEROL/HDL RATIO: 5
CO2 SERPL-SCNC: 23 MMOL/L (ref 20–31)
CREAT SERPL-MCNC: 0.6 MG/DL (ref 0.7–1.2)
EOSINOPHIL # BLD: 0.08 K/UL (ref 0–0.44)
EOSINOPHILS RELATIVE PERCENT: 1 % (ref 1–4)
ERYTHROCYTE [DISTWIDTH] IN BLOOD BY AUTOMATED COUNT: 11.8 % (ref 11.8–14.4)
EST. AVERAGE GLUCOSE BLD GHB EST-MCNC: 134 MG/DL
GFR SERPL CREATININE-BSD FRML MDRD: >60 ML/MIN/1.73M2
GLUCOSE SERPL-MCNC: 95 MG/DL (ref 70–99)
HBA1C MFR BLD: 6.3 % (ref 4–6)
HCT VFR BLD AUTO: 44.5 % (ref 40.7–50.3)
HDLC SERPL-MCNC: 32 MG/DL
HGB BLD-MCNC: 15.4 G/DL (ref 13–17)
IMM GRANULOCYTES # BLD AUTO: <0.03 K/UL (ref 0–0.3)
IMM GRANULOCYTES NFR BLD: 0 %
LDLC SERPL CALC-MCNC: 89 MG/DL (ref 0–100)
LYMPHOCYTES NFR BLD: 2.93 K/UL (ref 1.1–3.7)
LYMPHOCYTES RELATIVE PERCENT: 35 % (ref 24–43)
MCH RBC QN AUTO: 31.4 PG (ref 25.2–33.5)
MCHC RBC AUTO-ENTMCNC: 34.6 G/DL (ref 28.4–34.8)
MCV RBC AUTO: 90.8 FL (ref 82.6–102.9)
MONOCYTES NFR BLD: 0.8 K/UL (ref 0.1–1.2)
MONOCYTES NFR BLD: 10 % (ref 3–12)
NEUTROPHILS NFR BLD: 53 % (ref 36–65)
NEUTS SEG NFR BLD: 4.44 K/UL (ref 1.5–8.1)
NRBC BLD-RTO: 0 PER 100 WBC
PLATELET # BLD AUTO: 249 K/UL (ref 138–453)
PMV BLD AUTO: 10.4 FL (ref 8.1–13.5)
POTASSIUM SERPL-SCNC: 4.5 MMOL/L (ref 3.7–5.3)
PROT SERPL-MCNC: 7.5 G/DL (ref 6.4–8.3)
RBC # BLD AUTO: 4.9 M/UL (ref 4.21–5.77)
SODIUM SERPL-SCNC: 135 MMOL/L (ref 135–144)
TRIGL SERPL-MCNC: 164 MG/DL
VLDLC SERPL CALC-MCNC: 33 MG/DL
WBC OTHER # BLD: 8.3 K/UL (ref 3.5–11.3)

## 2024-02-16 PROCEDURE — 85025 COMPLETE CBC W/AUTO DIFF WBC: CPT

## 2024-02-16 PROCEDURE — 83036 HEMOGLOBIN GLYCOSYLATED A1C: CPT

## 2024-02-16 PROCEDURE — 36415 COLL VENOUS BLD VENIPUNCTURE: CPT

## 2024-02-16 PROCEDURE — 80061 LIPID PANEL: CPT

## 2024-02-16 PROCEDURE — 80053 COMPREHEN METABOLIC PANEL: CPT

## 2024-02-16 RX ORDER — CHOLECALCIFEROL (VITAMIN D3) 125 MCG
1 CAPSULE ORAL DAILY
Qty: 90 CAPSULE | Refills: 0 | COMMUNITY
Start: 2024-02-16 | End: 2024-05-16

## 2024-02-16 RX ORDER — GLIPIZIDE 5 MG/1
5 TABLET ORAL 2 TIMES DAILY
Qty: 60 TABLET | Refills: 3 | Status: SHIPPED | OUTPATIENT
Start: 2024-02-16

## 2024-02-16 RX ORDER — LURASIDONE HYDROCHLORIDE 20 MG/1
20 TABLET, FILM COATED ORAL
Qty: 90 TABLET | Refills: 0 | Status: SHIPPED | OUTPATIENT
Start: 2024-02-16 | End: 2024-05-16

## 2024-02-16 RX ORDER — LAMOTRIGINE 200 MG/1
200 TABLET ORAL DAILY
Qty: 90 TABLET | Refills: 0 | Status: SHIPPED | OUTPATIENT
Start: 2024-02-16 | End: 2024-05-16

## 2024-02-16 RX ORDER — ATORVASTATIN CALCIUM 80 MG/1
80 TABLET, FILM COATED ORAL DAILY
Qty: 90 TABLET | Refills: 1 | Status: SHIPPED | OUTPATIENT
Start: 2024-02-16

## 2024-02-16 NOTE — PROGRESS NOTES
[facility]      Patient:  Tam Barr 48 y.o. male     Date of Service: 11/15/23      Chief Complaint:   Chief Complaint   Patient presents with    Diabetes         History of Present Illness   Has a known history of bipolar 2. He was previously following with a psychiatric practitioner with [facility] however given the fact that he works remotely and is unable to attend appointments regularly he was discharged from their practice and medications were not renewed.  Currently notes stable mood, no SI/HI.  No VH/AH.  Requesting refills on Lamictal and Latuda.  Tolerates them well without side effects or intolerances and takes them regularly.    Current currently maintained on amlodipine 10 mg daily, lisinopril 40 mg, coreg for history of hypertension. Asymptomatic at this time with no CP/SOB.  Does follow a low-sodium diet.  Is physically active with his job. BP much improved, home readings similar     Hyperlipidemia:  Current treatment includes atorvastatin 80mg QD.  He was also started on Zetia however due to significant joint pains coupled with the atorvastatin has since discontinued the Zetia.    Also with a known history of type 2 diabetes, A1c most recently was 6.9.  No current hypo/hyperglycemic symptoms.  Does not regularly check blood sugars.  Does not have a CGM in place.  Was previously tried on metformin/metformin XR at lowest dose daily however was unable to tolerate it due to abdominal side effects.  Has conducted significant lifestyle modifications including dietary modifications and physical activity modifications to further combat diabetes since last evaluation Allergies:    Patient has no known allergies.    Medication List:    Current Outpatient Medications   Medication Sig Dispense Refill    atorvastatin (LIPITOR) 80 MG tablet Take 1 tablet by mouth daily 90 tablet 1    glipiZIDE (GLUCOTROL) 5 MG tablet Take 1 tablet by mouth 2 times daily 60 tablet 3    lamoTRIgine

## 2024-02-23 ENCOUNTER — TELEPHONE (OUTPATIENT)
Dept: PRIMARY CARE CLINIC | Age: 49
End: 2024-02-23

## 2024-02-23 NOTE — TELEPHONE ENCOUNTER
Notified patient that he would need an appt to be treated properly and with [place] another [place] it is best that he seek medical care locally to where he is at this time. He agreed.

## 2024-02-23 NOTE — TELEPHONE ENCOUNTER
----- Message from Agnes Salas sent at 2/23/2024 10:15 AM EST -----  Subject: Message to Provider    QUESTIONS  Information for Provider? Patient would like to see if he can have a   script sent in to his pharmacy currently thinks he is having yeast   infection, has been treated for chronic prostate before. Patient stated   when he urinates it burns also slight drips would like the RX sent to   [facility] located at [address] [place] [address]. Please   advise patient didn't want a appt due to being out of town , just   something sent in to his pharmacy or suggest other options. Would be in   [place] until the 27th.  ---------------------------------------------------------------------------  --------------  CALL BACK INFO  [phone]; OK to leave message on voicemail  ---------------------------------------------------------------------------  --------------  SCRIPT ANSWERS  Relationship to Patient? Self

## 2024-02-23 NOTE — TELEPHONE ENCOUNTER
----- Message from Agnes Salas sent at 2/23/2024 10:15 AM EST -----  Subject: Message to Provider    QUESTIONS  Information for Provider? Patient would like to see if he can have a   script sent in to his pharmacy currently thinks he is having yeast   infection, has been treated for chronic prostate before. Patient stated   when he urinates it burns also slight drips would like the RX sent to   [facility] located at [address] [place] [address]. Please   advise patient didn't want a appt due to being out of town , just   something sent in to his pharmacy or suggest other options. Would be in   [place] until the 27th.  ---------------------------------------------------------------------------  --------------  CALL BACK INFO  [phone]; OK to leave message on voicemail  ---------------------------------------------------------------------------  --------------  SCRIPT ANSWERS  Relationship to Patient? Self

## 2024-02-26 ENCOUNTER — SCHEDULED TELEPHONE ENCOUNTER (OUTPATIENT)
Dept: PRIMARY CARE CLINIC | Age: 49
End: 2024-02-26
Payer: COMMERCIAL

## 2024-02-26 DIAGNOSIS — R21 RASH: Primary | ICD-10-CM

## 2024-02-26 PROCEDURE — 99443 PR PHYS/QHP TELEPHONE EVALUATION 21-30 MIN: CPT | Performed by: STUDENT IN AN ORGANIZED HEALTH CARE EDUCATION/TRAINING PROGRAM

## 2024-02-26 RX ORDER — KETOCONAZOLE 20 MG/ML
SHAMPOO TOPICAL
Qty: 100 ML | Refills: 0 | Status: SHIPPED | OUTPATIENT
Start: 2024-02-26

## 2024-02-26 RX ORDER — FLUCONAZOLE 150 MG/1
150 TABLET ORAL WEEKLY
Qty: 6 TABLET | Refills: 0 | Status: SHIPPED | OUTPATIENT
Start: 2024-02-26 | End: 2024-04-02

## 2024-02-26 RX ORDER — CLOTRIMAZOLE 1 %
CREAM (GRAM) TOPICAL 2 TIMES DAILY
Qty: 85 G | Refills: 1 | Status: SHIPPED | OUTPATIENT
Start: 2024-02-26

## 2024-02-26 NOTE — PROGRESS NOTES
shampoo Apply topically daily as needed. Yes Familia Woodall MD   atorvastatin (LIPITOR) 80 MG tablet Take 1 tablet by mouth daily  Lucia Miller MD   glipiZIDE (GLUCOTROL) 5 MG tablet Take 1 tablet by mouth 2 times daily  Lucia Miller MD   lamoTRIgine (LAMICTAL) 200 MG tablet Take 1 tablet by mouth daily  Lucia Miller MD   lurasidone (LATUDA) 20 MG TABS tablet Take 1 tablet by mouth Daily with supper  Lucia Miller MD   coenzyme Q-10 100 MG capsule Take 1 capsule by mouth daily  Lucia Miller MD   clopidogrel (PLAVIX) 75 MG tablet Take 1 tablet by mouth daily  Sofia Mar PA-C   ezetimibe (ZETIA) 10 MG tablet Take 1 tablet by mouth daily  Sofia Mar PA-C   lisinopril (PRINIVIL;ZESTRIL) 40 MG tablet Take 1 tablet by mouth daily  Sofia Mar PA-C   carvedilol (COREG) 6.25 MG tablet Take 1 tablet by mouth 2 times daily  Sofia Mar PA-C   aspirin 81 MG EC tablet Take 1 tablet by mouth daily  ProviderMonster MD   amLODIPine (NORVASC) 10 MG tablet Take 1 tablet by mouth daily  Sofia Mar PA-C   blood glucose monitor kit and supplies Dispense sufficient amount for indicated testing frequency plus additional to accommodate PRN testing needs. Dispense all needed supplies to include: monitor, strips, lancing device, lancets, control solutions, alcohol swabs.  Guerda Ohara, APRN - CNP     HPI  Patient is calling in with complaints of a yeast infection that comes and goes over the years.. He stated it began about two weeks, and worsened with time. He stated it is localized in his stomach and groin area. He stated there is an itching/burning sensation around it. He is not currently doing anything for treatment. The patient indicates that it flares up when he eats sugar. His last episode was about 5 years ago. He states that he had been given an antibiotic and diflucan with relief of his symptoms at the time. He does have some redness on his scrotum.

## 2024-03-07 RX ORDER — AMLODIPINE BESYLATE 10 MG/1
10 TABLET ORAL DAILY
Qty: 90 TABLET | Refills: 2 | Status: SHIPPED | OUTPATIENT
Start: 2024-03-07

## 2024-03-07 RX ORDER — AMLODIPINE BESYLATE 5 MG/1
5 TABLET ORAL DAILY
Qty: 90 TABLET | Refills: 1 | OUTPATIENT
Start: 2024-03-07

## 2024-04-11 ENCOUNTER — HOSPITAL ENCOUNTER (OUTPATIENT)
Dept: CT IMAGING | Age: 49
Discharge: HOME | End: 2024-04-13
Attending: STUDENT IN AN ORGANIZED HEALTH CARE EDUCATION/TRAINING PROGRAM
Payer: COMMERCIAL

## 2024-04-11 ENCOUNTER — HOSPITAL ENCOUNTER (OUTPATIENT)
Age: 49
Discharge: HOME | End: 2024-04-11
Attending: STUDENT IN AN ORGANIZED HEALTH CARE EDUCATION/TRAINING PROGRAM
Payer: COMMERCIAL

## 2024-04-11 ENCOUNTER — OFFICE VISIT (OUTPATIENT)
Dept: PRIMARY CARE CLINIC | Age: 49
End: 2024-04-11
Attending: STUDENT IN AN ORGANIZED HEALTH CARE EDUCATION/TRAINING PROGRAM
Payer: COMMERCIAL

## 2024-04-11 VITALS
DIASTOLIC BLOOD PRESSURE: 82 MMHG | HEART RATE: 64 BPM | HEIGHT: 72 IN | RESPIRATION RATE: 16 BRPM | BODY MASS INDEX: 36.57 KG/M2 | WEIGHT: 270 LBS | SYSTOLIC BLOOD PRESSURE: 142 MMHG

## 2024-04-11 DIAGNOSIS — Z12.5 PROSTATE CANCER SCREENING: ICD-10-CM

## 2024-04-11 DIAGNOSIS — R10.9 ABDOMINAL PAIN, UNSPECIFIED ABDOMINAL LOCATION: Primary | ICD-10-CM

## 2024-04-11 DIAGNOSIS — R19.09 GROIN SWELLING: ICD-10-CM

## 2024-04-11 DIAGNOSIS — R10.9 ABDOMINAL PAIN, UNSPECIFIED ABDOMINAL LOCATION: ICD-10-CM

## 2024-04-11 DIAGNOSIS — A63.0 GENITAL WARTS: ICD-10-CM

## 2024-04-11 LAB
BACTERIA URNS QL MICRO: ABNORMAL
BILIRUB UR QL STRIP: NEGATIVE
CLARITY UR: CLEAR
COLOR UR: YELLOW
CREAT SERPL-MCNC: 0.7 MG/DL (ref 0.7–1.2)
EPI CELLS #/AREA URNS HPF: ABNORMAL /HPF (ref 0–5)
GFR SERPL CREATININE-BSD FRML MDRD: >90 ML/MIN/1.73M2
GLUCOSE UR STRIP-MCNC: NEGATIVE MG/DL
HGB UR QL STRIP.AUTO: NEGATIVE
KETONES UR STRIP-MCNC: NEGATIVE MG/DL
LEUKOCYTE ESTERASE UR QL STRIP: NEGATIVE
MUCOUS THREADS URNS QL MICRO: ABNORMAL
NITRITE UR QL STRIP: NEGATIVE
PH UR STRIP: 6 [PH] (ref 5–9)
PROT UR STRIP-MCNC: NEGATIVE MG/DL
RBC #/AREA URNS HPF: ABNORMAL /HPF (ref 0–2)
SP GR UR STRIP: >1.03 (ref 1.01–1.02)
UROBILINOGEN UR STRIP-ACNC: NORMAL EU/DL (ref 0–1)
WBC #/AREA URNS HPF: ABNORMAL /HPF (ref 0–5)

## 2024-04-11 PROCEDURE — 6360000004 HC RX CONTRAST MEDICATION: Performed by: STUDENT IN AN ORGANIZED HEALTH CARE EDUCATION/TRAINING PROGRAM

## 2024-04-11 PROCEDURE — 3079F DIAST BP 80-89 MM HG: CPT | Performed by: STUDENT IN AN ORGANIZED HEALTH CARE EDUCATION/TRAINING PROGRAM

## 2024-04-11 PROCEDURE — G8427 DOCREV CUR MEDS BY ELIG CLIN: HCPCS | Performed by: STUDENT IN AN ORGANIZED HEALTH CARE EDUCATION/TRAINING PROGRAM

## 2024-04-11 PROCEDURE — 87086 URINE CULTURE/COLONY COUNT: CPT

## 2024-04-11 PROCEDURE — 3017F COLORECTAL CA SCREEN DOC REV: CPT | Performed by: STUDENT IN AN ORGANIZED HEALTH CARE EDUCATION/TRAINING PROGRAM

## 2024-04-11 PROCEDURE — 84153 ASSAY OF PSA TOTAL: CPT

## 2024-04-11 PROCEDURE — 36415 COLL VENOUS BLD VENIPUNCTURE: CPT

## 2024-04-11 PROCEDURE — G2211 COMPLEX E/M VISIT ADD ON: HCPCS | Performed by: STUDENT IN AN ORGANIZED HEALTH CARE EDUCATION/TRAINING PROGRAM

## 2024-04-11 PROCEDURE — 82565 ASSAY OF CREATININE: CPT

## 2024-04-11 PROCEDURE — G8417 CALC BMI ABV UP PARAM F/U: HCPCS | Performed by: STUDENT IN AN ORGANIZED HEALTH CARE EDUCATION/TRAINING PROGRAM

## 2024-04-11 PROCEDURE — 1036F TOBACCO NON-USER: CPT | Performed by: STUDENT IN AN ORGANIZED HEALTH CARE EDUCATION/TRAINING PROGRAM

## 2024-04-11 PROCEDURE — 74177 CT ABD & PELVIS W/CONTRAST: CPT

## 2024-04-11 PROCEDURE — 99214 OFFICE O/P EST MOD 30 MIN: CPT | Performed by: STUDENT IN AN ORGANIZED HEALTH CARE EDUCATION/TRAINING PROGRAM

## 2024-04-11 PROCEDURE — 81001 URINALYSIS AUTO W/SCOPE: CPT

## 2024-04-11 PROCEDURE — 3077F SYST BP >= 140 MM HG: CPT | Performed by: STUDENT IN AN ORGANIZED HEALTH CARE EDUCATION/TRAINING PROGRAM

## 2024-04-11 RX ORDER — IMIQUIMOD 12.5 MG/.25G
CREAM TOPICAL
Qty: 24 EACH | Refills: 1 | Status: SHIPPED | OUTPATIENT
Start: 2024-04-12

## 2024-04-11 RX ORDER — CIPROFLOXACIN 500 MG/1
500 TABLET, FILM COATED ORAL 2 TIMES DAILY
Qty: 14 TABLET | Refills: 0 | Status: SHIPPED | OUTPATIENT
Start: 2024-04-11 | End: 2024-04-25

## 2024-04-11 RX ADMIN — IOPAMIDOL 75 ML: 755 INJECTION, SOLUTION INTRAVENOUS at 15:23

## 2024-04-11 SDOH — ECONOMIC STABILITY: FOOD INSECURITY: WITHIN THE PAST 12 MONTHS, YOU WORRIED THAT YOUR FOOD WOULD RUN OUT BEFORE YOU GOT MONEY TO BUY MORE.: NEVER TRUE

## 2024-04-11 SDOH — ECONOMIC STABILITY: FOOD INSECURITY: WITHIN THE PAST 12 MONTHS, THE FOOD YOU BOUGHT JUST DIDN'T LAST AND YOU DIDN'T HAVE MONEY TO GET MORE.: NEVER TRUE

## 2024-04-11 SDOH — ECONOMIC STABILITY: INCOME INSECURITY: HOW HARD IS IT FOR YOU TO PAY FOR THE VERY BASICS LIKE FOOD, HOUSING, MEDICAL CARE, AND HEATING?: NOT HARD AT ALL

## 2024-04-11 NOTE — PROGRESS NOTES
well-developed and well-nourished. No distress.   HENT: Head: Normocephalic and atraumatic.   Eyes: Pupils are equal, round, and reactive to light. Conjunctivae are normal. Right eye exhibits no discharge. Left eye exhibits no discharge.   Cardiovascular: Normal rate, regular rhythm and normal heart sounds.   Pulmonary/Chest: Effort normal and breath sounds normal. No respiratory distress. Patient has no wheezes.   Abdominal: Soft. Bowel sounds are normal. Patient exhibits no distension. There is no tenderness.    exam deferred per the patient's preference.  Musculoskeletal:  Patient exhibits no edema and tenderness. Patient exhibits no deformity.   Neurological: Patient is alert and oriented to person, place, and time.   Skin: Skin is warm and dry. Patient is not diaphoretic.   Psychiatric: Patient's speech is normal and behavior is normal. Thought content normal.   Vitals reviewed.      Lab Results   Component Value Date    WBC 8.3 02/16/2024    HGB 15.4 02/16/2024    HCT 44.5 02/16/2024     02/16/2024    CHOL 153 02/16/2024    TRIG 164 (H) 02/16/2024    HDL 32 (L) 02/16/2024    LDLDIRECT 194 (H) 05/31/2022    ALT 55 (H) 02/16/2024    AST 27 02/16/2024     02/16/2024    K 4.5 02/16/2024     02/16/2024    CREATININE 0.6 (L) 02/16/2024    BUN 10 02/16/2024    CO2 23 02/16/2024    TSH 3.22 11/09/2021    PSA 0.28 02/21/2023    INR 0.99 11/21/2023    LABA1C 6.3 (H) 02/16/2024     Lab Results   Component Value Date    CALCIUM 9.5 02/16/2024     Lab Results   Component Value Date    LDLCALC 88 11/23/2023    LDLCHOLESTEROL 89 02/16/2024    LDLDIRECT 194 (H) 05/31/2022       Please note that this chart was generated using voice recognition Dragon dictation software. Although every effort was made to ensure the accuracy of this automated transcription, some errors in transcription may have occurred.    Electronically signed by Dr. Familia Woodall MD on 4/11/2024 at 2:13 PM

## 2024-04-12 LAB
MICROORGANISM SPEC CULT: NO GROWTH
PSA SERPL-MCNC: 0.3 NG/ML (ref 0–4)
SPECIMEN DESCRIPTION: NORMAL

## 2024-04-25 ENCOUNTER — OFFICE VISIT (OUTPATIENT)
Dept: PRIMARY CARE CLINIC | Age: 49
End: 2024-04-25
Payer: COMMERCIAL

## 2024-04-25 VITALS
HEART RATE: 84 BPM | RESPIRATION RATE: 63 BRPM | BODY MASS INDEX: 36.43 KG/M2 | OXYGEN SATURATION: 96 % | SYSTOLIC BLOOD PRESSURE: 128 MMHG | WEIGHT: 268.6 LBS | DIASTOLIC BLOOD PRESSURE: 82 MMHG

## 2024-04-25 DIAGNOSIS — I10 PRIMARY HYPERTENSION: ICD-10-CM

## 2024-04-25 DIAGNOSIS — E78.2 MIXED HYPERLIPIDEMIA: ICD-10-CM

## 2024-04-25 DIAGNOSIS — E11.9 TYPE 2 DIABETES MELLITUS WITHOUT COMPLICATION, WITHOUT LONG-TERM CURRENT USE OF INSULIN (HCC): ICD-10-CM

## 2024-04-25 DIAGNOSIS — K76.0 HEPATIC STEATOSIS: ICD-10-CM

## 2024-04-25 DIAGNOSIS — N41.1 CHRONIC PROSTATITIS: Primary | ICD-10-CM

## 2024-04-25 PROCEDURE — 1036F TOBACCO NON-USER: CPT | Performed by: FAMILY MEDICINE

## 2024-04-25 PROCEDURE — 3079F DIAST BP 80-89 MM HG: CPT | Performed by: FAMILY MEDICINE

## 2024-04-25 PROCEDURE — 2022F DILAT RTA XM EVC RTNOPTHY: CPT | Performed by: FAMILY MEDICINE

## 2024-04-25 PROCEDURE — 3017F COLORECTAL CA SCREEN DOC REV: CPT | Performed by: FAMILY MEDICINE

## 2024-04-25 PROCEDURE — G8427 DOCREV CUR MEDS BY ELIG CLIN: HCPCS | Performed by: FAMILY MEDICINE

## 2024-04-25 PROCEDURE — G2211 COMPLEX E/M VISIT ADD ON: HCPCS | Performed by: FAMILY MEDICINE

## 2024-04-25 PROCEDURE — 3074F SYST BP LT 130 MM HG: CPT | Performed by: FAMILY MEDICINE

## 2024-04-25 PROCEDURE — G8417 CALC BMI ABV UP PARAM F/U: HCPCS | Performed by: FAMILY MEDICINE

## 2024-04-25 PROCEDURE — 99214 OFFICE O/P EST MOD 30 MIN: CPT | Performed by: FAMILY MEDICINE

## 2024-04-25 PROCEDURE — 3044F HG A1C LEVEL LT 7.0%: CPT | Performed by: FAMILY MEDICINE

## 2024-04-25 RX ORDER — GLIPIZIDE 5 MG/1
5 TABLET ORAL 2 TIMES DAILY
Qty: 180 TABLET | Refills: 1 | Status: SHIPPED | OUTPATIENT
Start: 2024-04-25

## 2024-04-25 RX ORDER — LAMOTRIGINE 200 MG/1
200 TABLET ORAL DAILY
Qty: 90 TABLET | Refills: 1 | Status: SHIPPED | OUTPATIENT
Start: 2024-04-25 | End: 2024-10-22

## 2024-04-25 RX ORDER — ATORVASTATIN CALCIUM 80 MG/1
80 TABLET, FILM COATED ORAL DAILY
Qty: 90 TABLET | Refills: 1 | Status: SHIPPED | OUTPATIENT
Start: 2024-04-25

## 2024-04-25 RX ORDER — CIPROFLOXACIN 500 MG/1
500 TABLET, FILM COATED ORAL 2 TIMES DAILY
Qty: 60 TABLET | Refills: 0 | Status: SHIPPED | OUTPATIENT
Start: 2024-04-25 | End: 2024-05-25

## 2024-04-25 RX ORDER — LURASIDONE HYDROCHLORIDE 20 MG/1
20 TABLET, FILM COATED ORAL
Qty: 90 TABLET | Refills: 1 | Status: SHIPPED | OUTPATIENT
Start: 2024-04-25 | End: 2024-10-22

## 2024-04-25 RX ORDER — AMLODIPINE BESYLATE 10 MG/1
10 TABLET ORAL DAILY
Qty: 90 TABLET | Refills: 1 | Status: SHIPPED | OUTPATIENT
Start: 2024-04-25

## 2024-04-25 NOTE — PROGRESS NOTES
[facility]      Patient:  Tam Barr 49 y.o. male     Date of Service: 11/15/23      Chief Complaint:   Chief Complaint   Patient presents with    Diabetes     Patient is here for a follow up for diabetes. He is not checking his sugars home, but stated his last A1C was a 6.3.     Hypertension     Patient is also here for a follow up for hypertension. He is not checking his blood pressure at home. He denies chest pain, palpitations and shortness of breath at this time.     Results     Patient would like to discuss his CT scan with the physician.          History of Present Illness   Has a known history of bipolar 2. Currently notes stable mood, no SI/HI.  No VH/AH.  Doing well on Lamictal and Latuda with strong symptomatic control and management.  Tolerates them well without side effects or intolerances and takes them regularly.    Current currently maintained on amlodipine 10 mg daily, lisinopril 40 mg, coreg for history of hypertension. Asymptomatic at this time with no CP/SOB.  Does follow a low-sodium diet.  Is physically active with his job. BP much improved, home readings similar     Hyperlipidemia:  Current treatment includes atorvastatin 80mg QD.  He was also started on Zetia however due to significant joint pains coupled with the atorvastatin has since discontinued the Zetia.    Also with a known history of type 2 diabetes, A1c most recently was 6.3  No current hypo/hyperglycemic symptoms.  Does not regularly check blood sugars.  Does not have a CGM in place.  Was previously tried on metformin/metformin XR at lowest dose daily however was unable to tolerate it due to abdominal side effects.  Has conducted significant lifestyle modifications including dietary modifications and physical activity modifications to further combat diabetes since last evaluation    Recently was evaluated for concerns of groin swelling, dysuria, rectal pain.  Patient notes these above symptoms and

## 2024-05-02 ENCOUNTER — APPOINTMENT (OUTPATIENT)
Dept: VASCULAR LAB | Age: 49
End: 2024-05-02
Payer: COMMERCIAL

## 2024-05-02 ENCOUNTER — APPOINTMENT (OUTPATIENT)
Dept: GENERAL RADIOLOGY | Age: 49
End: 2024-05-02
Payer: COMMERCIAL

## 2024-05-02 ENCOUNTER — HOSPITAL ENCOUNTER (EMERGENCY)
Age: 49
Discharge: HOME | End: 2024-05-02
Attending: EMERGENCY MEDICINE
Payer: COMMERCIAL

## 2024-05-02 VITALS
HEIGHT: 72 IN | RESPIRATION RATE: 16 BRPM | HEART RATE: 79 BPM | OXYGEN SATURATION: 97 % | WEIGHT: 270 LBS | DIASTOLIC BLOOD PRESSURE: 92 MMHG | SYSTOLIC BLOOD PRESSURE: 136 MMHG | BODY MASS INDEX: 36.57 KG/M2

## 2024-05-02 DIAGNOSIS — M25.562 ACUTE PAIN OF LEFT KNEE: Primary | ICD-10-CM

## 2024-05-02 LAB — ECHO BSA: 2.49 M2

## 2024-05-02 PROCEDURE — 93971 EXTREMITY STUDY: CPT | Performed by: SURGERY

## 2024-05-02 PROCEDURE — 93971 EXTREMITY STUDY: CPT

## 2024-05-02 PROCEDURE — 73562 X-RAY EXAM OF KNEE 3: CPT

## 2024-05-02 PROCEDURE — 99284 EMERGENCY DEPT VISIT MOD MDM: CPT

## 2024-05-02 RX ORDER — SULFAMETHOXAZOLE AND TRIMETHOPRIM 800; 160 MG/1; MG/1
1 TABLET ORAL 2 TIMES DAILY
Qty: 56 TABLET | Refills: 0 | Status: SHIPPED | OUTPATIENT
Start: 2024-05-02 | End: 2024-05-30

## 2024-05-02 ASSESSMENT — PAIN SCALES - GENERAL: PAINLEVEL_OUTOF10: 10

## 2024-05-02 ASSESSMENT — PAIN DESCRIPTION - DESCRIPTORS: DESCRIPTORS: OTHER (COMMENT)

## 2024-05-02 ASSESSMENT — PAIN DESCRIPTION - LOCATION: LOCATION: KNEE

## 2024-05-02 ASSESSMENT — PAIN DESCRIPTION - ORIENTATION: ORIENTATION: LEFT

## 2024-05-02 NOTE — DISCHARGE INSTRUCTIONS
The cause of your pain was not identified. Xray and ultrasound imaging today shows no finding of concern. There is no evidence of infection or blood clots. I suspect that your antibiotic may be causing some tendon inflammation.    Stop using the Cipro and begin taking Bactrim as prescribed.    Please start using the Voltaren as directed for pain relief for the next 2-3 days.I recommend use of a commercial knee brace for additional support. Please use the crutches as directed.

## 2024-05-02 NOTE — ED PROVIDER NOTES
Final Result   No acute osseous abnormality             EMERGENCY DEPARTMENT COURSE and DIFFERENTIAL DIAGNOSIS/MDM:     Hemodynamically stable and generally well-appearing patient presents to the ED for evaluation of atraumatic left lateral knee pain.  On exam there is tenderness across the posterior portion of the lateral joint line and along the tendon.  Patellar tendon appears intact.  Normal range of motion about the knee with no erythema or warmth.  Patella is not ballotable.  No crepitus.  No suspicion of infection based on exam and history.    X-ray demonstrates no acute abnormality.    Given the mild associated left calf discomfort I did obtain a vascular ultrasound.  Per the performing technologist there is no evidence of DVT; physician interpretation is pending at this time.    Given the patient's use of Cipro I am concerned for developing tendinopathy, though there is no suggestion at this time of tendon rupture.  Plan is to place in a knee immobilizer and crutches for support.  Will discontinue Cipro and start on Bactrim DS for 28-day course.  Short course of Voltaren for pain relief.  Case discussed with patient's physician, Dr. Miller, who is amenable to plan.    I did advise that the patient could buy a less restrictive commercial brace for support if he did not wish to use the knee immobilizer provided in the ED.         FINAL IMPRESSION      1. Acute pain of left knee          DISPOSITION/PLAN     DISPOSITION Decision To Discharge 05/02/2024 11:34:56 AM      PATIENT REFERRED TO:  J Carlos Grant DO  [facility]  [address] [place] [address] [address]  [facility] [address] [phone]    Schedule an appointment as soon as possible for a visit in 2 days  If not improving    Lucia Miller MD  [address] [place] [id number] [phone]    Schedule an appointment as soon as possible for a visit in 3 days        DISCHARGE MEDICATIONS:  New Prescriptions    DICLOFENAC SODIUM  10/18

## 2024-05-06 ENCOUNTER — TELEPHONE (OUTPATIENT)
Dept: GASTROENTEROLOGY | Age: 49
End: 2024-05-06

## 2024-05-06 DIAGNOSIS — Z01.818 PRE-OP TESTING: Primary | ICD-10-CM

## 2024-05-06 NOTE — TELEPHONE ENCOUNTER
Order placed , M left with patient advising of need for EKG prior to procedure. Awaiting return call.

## 2024-05-06 NOTE — TELEPHONE ENCOUNTER
Patient is scheduled to have a Colonoscopy procedure scheduled for 5/23/24.    Surgery requires the following medications Plavix to be stopped prior to surgery Please advise on how many days anticoagulation should be stopped.    PLEASE INDICATE WHETHER PATIENT IS CLEARED FOR SURGERY.

## 2024-05-08 ENCOUNTER — TELEPHONE (OUTPATIENT)
Dept: GASTROENTEROLOGY | Age: 49
End: 2024-05-08

## 2024-05-08 DIAGNOSIS — R00.2 PALPITATIONS: ICD-10-CM

## 2024-05-08 DIAGNOSIS — R94.39 ABNORMAL STRESS ECG: ICD-10-CM

## 2024-05-08 DIAGNOSIS — E66.09 CLASS 2 OBESITY DUE TO EXCESS CALORIES WITH BODY MASS INDEX (BMI) OF 36.0 TO 36.9 IN ADULT, UNSPECIFIED WHETHER SERIOUS COMORBIDITY PRESENT: ICD-10-CM

## 2024-05-08 DIAGNOSIS — I10 PRIMARY HYPERTENSION: ICD-10-CM

## 2024-05-08 DIAGNOSIS — E78.2 MIXED HYPERLIPIDEMIA: ICD-10-CM

## 2024-05-08 DIAGNOSIS — R06.02 SOB (SHORTNESS OF BREATH): ICD-10-CM

## 2024-05-08 DIAGNOSIS — R42 DIZZINESS: ICD-10-CM

## 2024-05-08 RX ORDER — LISINOPRIL 40 MG/1
40 TABLET ORAL DAILY
Qty: 90 TABLET | Refills: 3 | Status: SHIPPED | OUTPATIENT
Start: 2024-05-08

## 2024-05-11 PROBLEM — Z12.5 PROSTATE CANCER SCREENING: Status: RESOLVED | Noted: 2024-04-11 | Resolved: 2024-05-11

## 2024-05-13 ENCOUNTER — TELEPHONE (OUTPATIENT)
Dept: UROLOGY | Age: 49
End: 2024-05-13

## 2024-05-13 NOTE — TELEPHONE ENCOUNTER
Attempt 3 - lvm for pt to call office & schedule appointment from referral. 3rd attempt contacting pt. Referral closed and sent back to PCP

## 2024-06-24 ENCOUNTER — OFFICE VISIT (OUTPATIENT)
Dept: UROLOGY | Age: 49
End: 2024-06-24
Payer: COMMERCIAL

## 2024-06-24 VITALS
TEMPERATURE: 97.4 F | WEIGHT: 270 LBS | HEIGHT: 72 IN | RESPIRATION RATE: 16 BRPM | OXYGEN SATURATION: 96 % | DIASTOLIC BLOOD PRESSURE: 80 MMHG | SYSTOLIC BLOOD PRESSURE: 132 MMHG | BODY MASS INDEX: 36.57 KG/M2 | HEART RATE: 76 BPM

## 2024-06-24 DIAGNOSIS — N50.89 SCROTAL SWELLING: Primary | ICD-10-CM

## 2024-06-24 DIAGNOSIS — R10.32 LEFT INGUINAL PAIN: ICD-10-CM

## 2024-06-24 PROCEDURE — 3017F COLORECTAL CA SCREEN DOC REV: CPT | Performed by: UROLOGY

## 2024-06-24 PROCEDURE — G8417 CALC BMI ABV UP PARAM F/U: HCPCS | Performed by: UROLOGY

## 2024-06-24 PROCEDURE — 3079F DIAST BP 80-89 MM HG: CPT | Performed by: UROLOGY

## 2024-06-24 PROCEDURE — 99204 OFFICE O/P NEW MOD 45 MIN: CPT | Performed by: UROLOGY

## 2024-06-24 PROCEDURE — G8427 DOCREV CUR MEDS BY ELIG CLIN: HCPCS | Performed by: UROLOGY

## 2024-06-24 PROCEDURE — 1036F TOBACCO NON-USER: CPT | Performed by: UROLOGY

## 2024-06-24 PROCEDURE — 3075F SYST BP GE 130 - 139MM HG: CPT | Performed by: UROLOGY

## 2024-06-24 ASSESSMENT — ENCOUNTER SYMPTOMS
SHORTNESS OF BREATH: 0
BACK PAIN: 0
NAUSEA: 0
VOMITING: 0
CONSTIPATION: 0
EYE PAIN: 0
COUGH: 0
WHEEZING: 0
ABDOMINAL PAIN: 0
EYE REDNESS: 0
DIARRHEA: 0

## 2024-06-24 NOTE — PROGRESS NOTES
Review of Systems   Constitutional:  Negative for chills, fatigue and fever.   Eyes:  Negative for pain, redness and visual disturbance.   Respiratory:  Negative for cough, shortness of breath and wheezing.    Cardiovascular:  Negative for chest pain and leg swelling.   Gastrointestinal:  Negative for abdominal pain, constipation, diarrhea, nausea and vomiting.   Genitourinary:  Positive for scrotal swelling and testicular pain. Negative for difficulty urinating, dysuria, flank pain, frequency, hematuria and urgency.   Musculoskeletal:  Negative for back pain, joint swelling and myalgias.   Skin:  Negative for rash and wound.   Neurological:  Negative for dizziness, tremors and numbness.   Hematological:  Does not bruise/bleed easily.     
non-distendedwith no CVA,  No flank tenderness,  Orhepatosplenomegaly   Lymphatics: No palpable lymphadenopathy.  Bladder non-tender and not distended.  Musculoskeletal: Normal gait and station  Penis normal and circumcised  Urethral meatus normal  Scrotal exam normal  Solitary right testis; normal      Assessment and Plan      1. Scrotal swelling    2. Left inguinal pain           Plan:  If scrotal ultrasound is normal, will consider general surgery evaluation for possible hernia as pain/swellng is triggered with Valsalva and bowel movements  Assessment & Plan     Prescriptions Ordered:  No orders of the defined types were placed in this encounter.     Orders Placed:  Orders Placed This Encounter   Procedures    US SCROTUM W LIMITED DUPLEX     This procedure can be scheduled via MyChart.      Standing Status:   Future     Standing Expiration Date:   6/24/2025     Order Specific Question:   Reason for exam:     Answer:   scrotal swelling             Desmond Harmon MD    Agree with the ROS entered by the MA.

## 2024-06-25 ENCOUNTER — HOSPITAL ENCOUNTER (OUTPATIENT)
Dept: ULTRASOUND IMAGING | Age: 49
Discharge: HOME | End: 2024-06-27
Attending: UROLOGY
Payer: COMMERCIAL

## 2024-06-25 DIAGNOSIS — N50.89 SCROTAL SWELLING: ICD-10-CM

## 2024-06-25 DIAGNOSIS — R10.32 LEFT INGUINAL PAIN: ICD-10-CM

## 2024-06-25 PROCEDURE — 93976 VASCULAR STUDY: CPT

## 2024-07-01 ENCOUNTER — OFFICE VISIT (OUTPATIENT)
Dept: UROLOGY | Age: 49
End: 2024-07-01
Payer: COMMERCIAL

## 2024-07-01 VITALS
HEART RATE: 76 BPM | TEMPERATURE: 97.8 F | HEIGHT: 70 IN | BODY MASS INDEX: 38.65 KG/M2 | DIASTOLIC BLOOD PRESSURE: 82 MMHG | OXYGEN SATURATION: 96 % | SYSTOLIC BLOOD PRESSURE: 124 MMHG | WEIGHT: 270 LBS

## 2024-07-01 DIAGNOSIS — R10.32 LEFT INGUINAL PAIN: ICD-10-CM

## 2024-07-01 DIAGNOSIS — N50.89 SCROTAL SWELLING: Primary | ICD-10-CM

## 2024-07-01 PROCEDURE — 1036F TOBACCO NON-USER: CPT | Performed by: UROLOGY

## 2024-07-01 PROCEDURE — G8427 DOCREV CUR MEDS BY ELIG CLIN: HCPCS | Performed by: UROLOGY

## 2024-07-01 PROCEDURE — 3017F COLORECTAL CA SCREEN DOC REV: CPT | Performed by: UROLOGY

## 2024-07-01 PROCEDURE — 3079F DIAST BP 80-89 MM HG: CPT | Performed by: UROLOGY

## 2024-07-01 PROCEDURE — 3074F SYST BP LT 130 MM HG: CPT | Performed by: UROLOGY

## 2024-07-01 PROCEDURE — G8417 CALC BMI ABV UP PARAM F/U: HCPCS | Performed by: UROLOGY

## 2024-07-01 PROCEDURE — 99214 OFFICE O/P EST MOD 30 MIN: CPT | Performed by: UROLOGY

## 2024-07-01 ASSESSMENT — ENCOUNTER SYMPTOMS
BACK PAIN: 0
WHEEZING: 0
RESPIRATORY NEGATIVE: 1
GASTROINTESTINAL NEGATIVE: 1
EYE REDNESS: 0
EYES NEGATIVE: 1
COLOR CHANGE: 0
VOMITING: 0
NAUSEA: 0
COUGH: 0
EYE PAIN: 0
ALLERGIC/IMMUNOLOGIC NEGATIVE: 1
SHORTNESS OF BREATH: 0
ABDOMINAL PAIN: 0

## 2024-07-01 NOTE — PROGRESS NOTES
[facility]YSICI[facility]  [address]  [facility] [address]  Dept: [phone]    [facility] Office Note - Established    Patient:  Tam Barr  YOB: 1975  Date: 7/1/2024    The patient is a 49 y.o. male who presents todayfor evaluation of the following problems:   Chief Complaint   Patient presents with    Results     1 wk US scrotum         HPI  This is a very pleasant 49-year-old gentleman who has had left groin pain.  He does have a solitary testis on the right side.  His scrotal ultrasound did not show any abnormalities other than his known solitary testicle.  He also had a CT scan previously that did not show any hernias.  He does have pain in his groins and low back as well as in the scrotum.  This typically happens after ejaculation but can also happen randomly.    Summary of old records: N/A    Additional History: N/A    Procedures Today: N/A    Urinalysis today:  No results found for this visit on 07/01/24.  Last several PSA's:  Lab Results   Component Value Date    PSA 0.30 04/11/2024    PSA 0.28 02/21/2023    PSA 0.33 11/09/2021     Last total testosterone:  No results found for: \"TESTOSTERONE\"    AUA Symptom Score (7/1/2024):                               Last BUN and creatinine:  Lab Results   Component Value Date    BUN 10 02/16/2024     Lab Results   Component Value Date    CREATININE 0.7 04/11/2024       Additional Lab/Culture results: none    Imaging Reviewed during this Office Visit: none  (results were independently reviewed by physician and radiology report verified)    PAST MEDICAL, FAMILY AND SOCIAL HISTORY UPDATE:  Past Medical History:   Diagnosis Date    Diabetes mellitus (HCC)     Headache     IBS (irritable bowel syndrome)     Mixed hyperlipidemia     Primary hypertension      Past Surgical History:   Procedure Laterality Date    CARDIAC CATHETERIZATION Left 11/21/2023    Dr Babb/Selena

## 2024-07-16 ENCOUNTER — HOSPITAL ENCOUNTER (OUTPATIENT)
Dept: PHYSICAL THERAPY | Facility: CLINIC | Age: 49
Setting detail: THERAPIES SERIES
Discharge: HOME | End: 2024-07-16
Attending: UROLOGY
Payer: COMMERCIAL

## 2024-07-16 PROCEDURE — 97530 THERAPEUTIC ACTIVITIES: CPT

## 2024-07-16 PROCEDURE — 97161 PT EVAL LOW COMPLEX 20 MIN: CPT

## 2024-07-16 NOTE — CONSULTS
[] [facility]  Outpatient Rehabilitation &  Therapy  [address]  [phone]  F: [phone] [] [facility]  Outpatient Rehabilitation &  Therapy  [address]   [address]  P: [phone]  F: [phone] [x] [facility]  Outpatient Rehabilitation &  Therapy  [address]  P: [phone]  F: [phone] [] [facility]  Outpatient Rehabilitation &  Therapy  [address]  P: [phone]  F: [phone] [] [facility]  Outpatient Rehabilitation &  Therapy  [address]   [address]   P: [phone]  F: [phone]      Physical Therapy General Evaluation/Pelvic Floor     Date:  2024  Patient: Tam CAICEDO Barr  : 1975  MRN: [id number]  Physician: Desmond Harmon MD    Insurance:  [employer] [place]: pan  -  vs remain, hard max, combined pt/ ot; no auth req'd; no copay; ded met; coins 20%; oop [id number]/ [id number] remain   Medical Diagnosis:   N50.89 (ICD-10-CM) - Scrotal swelling   R10.32 (ICD-10-CM) - Left inguinal pain         Rehab Codes: R10.2, R10.30, M54.5, R27.8, M62.81,     Onset Date: 24                        Next 's appt: 24     Subjective:   CC/HPI: Patient reports to physical therapy with pelvic pain, inguinal pain, abdominal pain and low back pain. 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 Did not feel that anything specific began the pain, however states that he normally has to work in tight areas where he is sitting a \"ball\" for long periods of time. States that since then the pain progressively became worse. Patient reports that he followed up with his physician and was diagnosed with a pelvic infection. Patient was prescribed antibiotics, however his symptoms continued. Patient did have a CT scan to rule out an inguinal hernia, with no findings at this time. States that he was referred to Dr. Harmon, where

## 2024-08-09 ENCOUNTER — CLINICAL DOCUMENTATION (OUTPATIENT)
Dept: PHYSICAL THERAPY | Facility: CLINIC | Age: 49
End: 2024-08-09

## 2024-08-09 NOTE — FLOWSHEET NOTE
Treatment to Date:  [] Therapeutic Exercise    [] Modalities:  [x] Therapeutic Activity    [] Ultrasound  [] Electrical Stimulation  [] Gait Training     [] Massage       [] Lumbar/Cervical Traction  [] Neuromuscular Re-education [] Cold/hotpack [] Iontophoresis: 4 mg/mL  [] Instruction in Home Exercise Program                     Dexamethasone Sodium  [] Manual Therapy             Phosphate 40-80 mAmin  [] Aquatic Therapy                   [] Vasocompression/    [] Other:             Game Ready    Discharge Status:     [] Pt recovered from conditions. Treatment goals were met.    [] Pt received maximum benefit. No further therapy indicated at this time.    [] Pt to continue exercise/home instructions independently.    [] Therapy interrupted due to:    [] Pt has 2 or more no shows/cancels, is discontinued per our policy.    [] Pt has completed prescribed number of treatment sessions.    [x] Other: Pt attended evaluation and did not schedule further visits         Electronically signed by Prerna Dale PTA on 8/9/2024 at 9:21 AM      If you have any questions or concerns, please don't hesitate to call.  Thank you for your referral.

## 2024-09-09 DIAGNOSIS — I10 PRIMARY HYPERTENSION: ICD-10-CM

## 2024-09-09 DIAGNOSIS — R42 DIZZINESS: ICD-10-CM

## 2024-09-09 DIAGNOSIS — Z95.5 HISTORY OF HEART ARTERY STENT: ICD-10-CM

## 2024-09-09 DIAGNOSIS — R00.2 PALPITATIONS: ICD-10-CM

## 2024-09-09 DIAGNOSIS — R94.39 ABNORMAL STRESS ECG: ICD-10-CM

## 2024-09-09 DIAGNOSIS — E78.2 MIXED HYPERLIPIDEMIA: ICD-10-CM

## 2024-09-09 DIAGNOSIS — E66.09 CLASS 2 OBESITY DUE TO EXCESS CALORIES WITH BODY MASS INDEX (BMI) OF 36.0 TO 36.9 IN ADULT, UNSPECIFIED WHETHER SERIOUS COMORBIDITY PRESENT: ICD-10-CM

## 2024-09-09 DIAGNOSIS — R06.02 SOB (SHORTNESS OF BREATH): ICD-10-CM

## 2024-09-09 DIAGNOSIS — I25.10 CORONARY ARTERY DISEASE INVOLVING NATIVE CORONARY ARTERY OF NATIVE HEART WITHOUT ANGINA PECTORIS: ICD-10-CM

## 2024-09-09 RX ORDER — CLOPIDOGREL BISULFATE 75 MG/1
75 TABLET ORAL DAILY
Qty: 90 TABLET | Refills: 3 | Status: SHIPPED | OUTPATIENT
Start: 2024-09-09

## 2024-09-09 RX ORDER — ATORVASTATIN CALCIUM 80 MG/1
80 TABLET, FILM COATED ORAL DAILY
Qty: 90 TABLET | Refills: 3 | Status: SHIPPED | OUTPATIENT
Start: 2024-09-09

## 2024-09-09 RX ORDER — ASPIRIN 81 MG/1
81 TABLET ORAL DAILY
Qty: 90 TABLET | Refills: 3 | Status: SHIPPED | OUTPATIENT
Start: 2024-09-09

## 2024-09-09 RX ORDER — CARVEDILOL 6.25 MG/1
6.25 TABLET ORAL 2 TIMES DAILY
Qty: 180 TABLET | Refills: 3 | Status: SHIPPED | OUTPATIENT
Start: 2024-09-09

## 2024-09-09 RX ORDER — AMLODIPINE BESYLATE 10 MG/1
10 TABLET ORAL DAILY
Qty: 90 TABLET | Refills: 3 | Status: SHIPPED | OUTPATIENT
Start: 2024-09-09

## 2024-09-09 RX ORDER — EZETIMIBE 10 MG/1
10 TABLET ORAL DAILY
Qty: 90 TABLET | Refills: 3 | Status: SHIPPED | OUTPATIENT
Start: 2024-09-09

## 2024-09-09 RX ORDER — LISINOPRIL 40 MG/1
40 TABLET ORAL DAILY
Qty: 90 TABLET | Refills: 3 | Status: SHIPPED | OUTPATIENT
Start: 2024-09-09

## 2024-11-05 ENCOUNTER — TELEPHONE (OUTPATIENT)
Dept: CARDIOLOGY | Age: 49
End: 2024-11-05

## 2024-11-05 NOTE — TELEPHONE ENCOUNTER
Mr. Barr  reports BP is 160/86 he is also having CP all the symptoms prior to before his stents. He is out of town but has appt with us Friday. Please advise

## 2024-11-08 ENCOUNTER — APPOINTMENT (OUTPATIENT)
Age: 49
End: 2024-11-08
Attending: STUDENT IN AN ORGANIZED HEALTH CARE EDUCATION/TRAINING PROGRAM
Payer: COMMERCIAL

## 2024-11-08 ENCOUNTER — HOSPITAL ENCOUNTER (EMERGENCY)
Age: 49
Discharge: HOME | End: 2024-11-08
Attending: STUDENT IN AN ORGANIZED HEALTH CARE EDUCATION/TRAINING PROGRAM
Payer: COMMERCIAL

## 2024-11-08 ENCOUNTER — APPOINTMENT (OUTPATIENT)
Dept: GENERAL RADIOLOGY | Age: 49
End: 2024-11-08
Payer: COMMERCIAL

## 2024-11-08 ENCOUNTER — OFFICE VISIT (OUTPATIENT)
Dept: CARDIOLOGY | Age: 49
End: 2024-11-08

## 2024-11-08 VITALS
DIASTOLIC BLOOD PRESSURE: 95 MMHG | OXYGEN SATURATION: 96 % | RESPIRATION RATE: 16 BRPM | TEMPERATURE: 98.4 F | BODY MASS INDEX: 39.48 KG/M2 | HEIGHT: 70 IN | SYSTOLIC BLOOD PRESSURE: 156 MMHG | HEART RATE: 65 BPM | WEIGHT: 275.8 LBS

## 2024-11-08 VITALS
BODY MASS INDEX: 39.48 KG/M2 | RESPIRATION RATE: 18 BRPM | HEART RATE: 73 BPM | SYSTOLIC BLOOD PRESSURE: 152 MMHG | WEIGHT: 275.8 LBS | HEIGHT: 70 IN | DIASTOLIC BLOOD PRESSURE: 81 MMHG

## 2024-11-08 DIAGNOSIS — E78.2 MIXED HYPERLIPIDEMIA: ICD-10-CM

## 2024-11-08 DIAGNOSIS — R42 DIZZINESS: ICD-10-CM

## 2024-11-08 DIAGNOSIS — E66.812 CLASS 2 OBESITY DUE TO EXCESS CALORIES WITH BODY MASS INDEX (BMI) OF 36.0 TO 36.9 IN ADULT, UNSPECIFIED WHETHER SERIOUS COMORBIDITY PRESENT: ICD-10-CM

## 2024-11-08 DIAGNOSIS — Z87.891 HISTORY OF TOBACCO ABUSE: ICD-10-CM

## 2024-11-08 DIAGNOSIS — Z95.5 HISTORY OF HEART ARTERY STENT: ICD-10-CM

## 2024-11-08 DIAGNOSIS — R06.02 SOB (SHORTNESS OF BREATH): ICD-10-CM

## 2024-11-08 DIAGNOSIS — R94.39 ABNORMAL STRESS ECG: ICD-10-CM

## 2024-11-08 DIAGNOSIS — E66.812 CLASS 2 OBESITY WITH BODY MASS INDEX (BMI) OF 35.0 TO 35.9 IN ADULT, UNSPECIFIED OBESITY TYPE, UNSPECIFIED WHETHER SERIOUS COMORBIDITY PRESENT: ICD-10-CM

## 2024-11-08 DIAGNOSIS — Z82.49 FAMILY HISTORY OF HEART DISEASE: ICD-10-CM

## 2024-11-08 DIAGNOSIS — I25.10 CORONARY ARTERY DISEASE INVOLVING NATIVE CORONARY ARTERY OF NATIVE HEART WITHOUT ANGINA PECTORIS: Primary | ICD-10-CM

## 2024-11-08 DIAGNOSIS — E11.69 TYPE 2 DIABETES MELLITUS WITH OTHER SPECIFIED COMPLICATION, UNSPECIFIED WHETHER LONG TERM INSULIN USE (HCC): ICD-10-CM

## 2024-11-08 DIAGNOSIS — E66.09 CLASS 2 OBESITY DUE TO EXCESS CALORIES WITH BODY MASS INDEX (BMI) OF 36.0 TO 36.9 IN ADULT, UNSPECIFIED WHETHER SERIOUS COMORBIDITY PRESENT: ICD-10-CM

## 2024-11-08 DIAGNOSIS — R07.9 EXERTIONAL CHEST PAIN: Primary | ICD-10-CM

## 2024-11-08 DIAGNOSIS — R00.2 PALPITATIONS: ICD-10-CM

## 2024-11-08 DIAGNOSIS — I10 PRIMARY HYPERTENSION: ICD-10-CM

## 2024-11-08 LAB
ALBUMIN SERPL-MCNC: 4.6 G/DL (ref 3.5–5.2)
ALBUMIN/GLOB SERPL: 1.5 {RATIO} (ref 1–2.5)
ALP SERPL-CCNC: 50 U/L (ref 40–129)
ALT SERPL-CCNC: 69 U/L (ref 10–50)
ANION GAP SERPL CALCULATED.3IONS-SCNC: 12 MMOL/L (ref 9–16)
AST SERPL-CCNC: 43 U/L (ref 10–50)
BASOPHILS # BLD: 0.04 K/UL (ref 0–0.2)
BASOPHILS NFR BLD: 1 % (ref 0–2)
BILIRUB DIRECT SERPL-MCNC: <0.2 MG/DL (ref 0–0.3)
BILIRUB INDIRECT SERPL-MCNC: ABNORMAL MG/DL (ref 0–1)
BILIRUB SERPL-MCNC: 0.2 MG/DL (ref 0–1.2)
BUN SERPL-MCNC: 12 MG/DL (ref 6–20)
BUN/CREAT SERPL: 17 (ref 9–20)
CALCIUM SERPL-MCNC: 9.5 MG/DL (ref 8.6–10.4)
CHLORIDE SERPL-SCNC: 101 MMOL/L (ref 98–107)
CO2 SERPL-SCNC: 26 MMOL/L (ref 20–31)
CREAT SERPL-MCNC: 0.7 MG/DL (ref 0.7–1.2)
ECHO AO ROOT DIAM: 1.9 CM
ECHO AO ROOT INDEX: 0.79 CM/M2
ECHO AO SINUS VALSALVA DIAM: 3 CM
ECHO AO SINUS VALSALVA INDEX: 1.26 CM/M2
ECHO AO ST JNCT DIAM: 2.3 CM
ECHO AR MAX VEL PISA: 2.3 M/S
ECHO AV CUSP MM: 2 CM
ECHO AV MEAN GRADIENT: 4 MMHG
ECHO AV MEAN VELOCITY: 0.9 M/S
ECHO AV PEAK GRADIENT: 8 MMHG
ECHO AV PEAK VELOCITY: 1.4 M/S
ECHO AV REGURGITANT PHT: 420 MS
ECHO AV VELOCITY RATIO: 0.86
ECHO AV VTI: 27.3 CM
ECHO BSA: 2.49 M2
ECHO EST RA PRESSURE: 3 MMHG
ECHO LA AREA 2C: 23.4 CM2
ECHO LA AREA 4C: 19.4 CM2
ECHO LA MAJOR AXIS: 6.2 CM
ECHO LA MINOR AXIS: 6.3 CM
ECHO LA VOL MOD A2C: 71 ML (ref 18–58)
ECHO LA VOL MOD A4C: 49 ML (ref 18–58)
ECHO LA VOLUME INDEX MOD A2C: 30 ML/M2 (ref 16–34)
ECHO LA VOLUME INDEX MOD A4C: 21 ML/M2 (ref 16–34)
ECHO LV E' LATERAL VELOCITY: 7.6 CM/S
ECHO LV EDV A2C: 96 ML
ECHO LV EDV A4C: 116 ML
ECHO LV EDV INDEX A4C: 49 ML/M2
ECHO LV EDV NDEX A2C: 40 ML/M2
ECHO LV EJECTION FRACTION A2C: 60 %
ECHO LV EJECTION FRACTION A4C: 55 %
ECHO LV EJECTION FRACTION BIPLANE: 60 % (ref 55–100)
ECHO LV ESV A2C: 39 ML
ECHO LV ESV A4C: 53 ML
ECHO LV ESV INDEX A2C: 16 ML/M2
ECHO LV ESV INDEX A4C: 22 ML/M2
ECHO LV FRACTIONAL SHORTENING: 35 % (ref 28–44)
ECHO LV INTERNAL DIMENSION DIASTOLE INDEX: 2.01 CM/M2
ECHO LV INTERNAL DIMENSION DIASTOLIC: 4.8 CM (ref 4.2–5.9)
ECHO LV INTERNAL DIMENSION SYSTOLIC INDEX: 1.3 CM/M2
ECHO LV INTERNAL DIMENSION SYSTOLIC: 3.1 CM
ECHO LV IVSD: 1 CM (ref 0.6–1)
ECHO LV MASS 2D: 181.9 G (ref 88–224)
ECHO LV MASS INDEX 2D: 76.1 G/M2 (ref 49–115)
ECHO LV POSTERIOR WALL DIASTOLIC: 1.1 CM (ref 0.6–1)
ECHO LV RELATIVE WALL THICKNESS RATIO: 0.46
ECHO LVOT AV VTI INDEX: 0.92
ECHO LVOT MEAN GRADIENT: 3 MMHG
ECHO LVOT PEAK GRADIENT: 6 MMHG
ECHO LVOT PEAK VELOCITY: 1.2 M/S
ECHO LVOT VTI: 25.1 CM
ECHO MV A VELOCITY: 0.66 M/S
ECHO MV E DECELERATION TIME (DT): 161 MS
ECHO MV E VELOCITY: 0.8 M/S
ECHO MV E/A RATIO: 1.21
ECHO MV E/E' LATERAL: 10.53
ECHO PV MAX VELOCITY: 1 M/S
ECHO PV PEAK GRADIENT: 4 MMHG
ECHO RIGHT VENTRICULAR SYSTOLIC PRESSURE (RVSP): 21 MMHG
ECHO TV REGURGITANT MAX VELOCITY: 2.12 M/S
ECHO TV REGURGITANT PEAK GRADIENT: 18 MMHG
EKG ATRIAL RATE: 69 BPM
EKG P AXIS: 26 DEGREES
EKG P-R INTERVAL: 160 MS
EKG Q-T INTERVAL: 412 MS
EKG QRS DURATION: 106 MS
EKG QTC CALCULATION (BAZETT): 441 MS
EKG R AXIS: 17 DEGREES
EKG T AXIS: 13 DEGREES
EKG VENTRICULAR RATE: 69 BPM
EOSINOPHIL # BLD: 0.14 K/UL (ref 0–0.44)
EOSINOPHILS RELATIVE PERCENT: 2 % (ref 1–4)
ERYTHROCYTE [DISTWIDTH] IN BLOOD BY AUTOMATED COUNT: 12.2 % (ref 11.8–14.4)
GFR, ESTIMATED: >90 ML/MIN/1.73M2
GLUCOSE SERPL-MCNC: 131 MG/DL (ref 74–99)
HCT VFR BLD AUTO: 44 % (ref 40.7–50.3)
HGB BLD-MCNC: 15.4 G/DL (ref 13–17)
IMM GRANULOCYTES # BLD AUTO: 0.03 K/UL (ref 0–0.3)
IMM GRANULOCYTES NFR BLD: 0 %
LYMPHOCYTES NFR BLD: 1.99 K/UL (ref 1.1–3.7)
LYMPHOCYTES RELATIVE PERCENT: 29 % (ref 24–43)
MCH RBC QN AUTO: 31.4 PG (ref 25.2–33.5)
MCHC RBC AUTO-ENTMCNC: 35 G/DL (ref 28.4–34.8)
MCV RBC AUTO: 89.8 FL (ref 82.6–102.9)
MONOCYTES NFR BLD: 0.84 K/UL (ref 0.1–1.2)
MONOCYTES NFR BLD: 12 % (ref 3–12)
NEUTROPHILS NFR BLD: 56 % (ref 36–65)
NEUTS SEG NFR BLD: 3.88 K/UL (ref 1.5–8.1)
NRBC BLD-RTO: 0 PER 100 WBC
PLATELET # BLD AUTO: 224 K/UL (ref 138–453)
PMV BLD AUTO: 10 FL (ref 8.1–13.5)
POTASSIUM SERPL-SCNC: 4.2 MMOL/L (ref 3.7–5.3)
PROT SERPL-MCNC: 7.8 G/DL (ref 6.6–8.7)
RBC # BLD AUTO: 4.9 M/UL (ref 4.21–5.77)
SODIUM SERPL-SCNC: 139 MMOL/L (ref 136–145)
TROPONIN I SERPL HS-MCNC: 14 NG/L (ref 0–22)
TROPONIN I SERPL HS-MCNC: 16 NG/L (ref 0–22)
WBC OTHER # BLD: 6.9 K/UL (ref 3.5–11.3)

## 2024-11-08 PROCEDURE — 6370000000 HC RX 637 (ALT 250 FOR IP): Performed by: STUDENT IN AN ORGANIZED HEALTH CARE EDUCATION/TRAINING PROGRAM

## 2024-11-08 PROCEDURE — 96375 TX/PRO/DX INJ NEW DRUG ADDON: CPT

## 2024-11-08 PROCEDURE — 93010 ELECTROCARDIOGRAM REPORT: CPT | Performed by: FAMILY MEDICINE

## 2024-11-08 PROCEDURE — 71045 X-RAY EXAM CHEST 1 VIEW: CPT

## 2024-11-08 PROCEDURE — 80076 HEPATIC FUNCTION PANEL: CPT

## 2024-11-08 PROCEDURE — 93005 ELECTROCARDIOGRAM TRACING: CPT | Performed by: STUDENT IN AN ORGANIZED HEALTH CARE EDUCATION/TRAINING PROGRAM

## 2024-11-08 PROCEDURE — 93306 TTE W/DOPPLER COMPLETE: CPT

## 2024-11-08 PROCEDURE — 6360000004 HC RX CONTRAST MEDICATION: Performed by: FAMILY MEDICINE

## 2024-11-08 PROCEDURE — 85025 COMPLETE CBC W/AUTO DIFF WBC: CPT

## 2024-11-08 PROCEDURE — 99285 EMERGENCY DEPT VISIT HI MDM: CPT

## 2024-11-08 PROCEDURE — 84484 ASSAY OF TROPONIN QUANT: CPT

## 2024-11-08 PROCEDURE — 6360000002 HC RX W HCPCS: Performed by: STUDENT IN AN ORGANIZED HEALTH CARE EDUCATION/TRAINING PROGRAM

## 2024-11-08 PROCEDURE — 96374 THER/PROPH/DIAG INJ IV PUSH: CPT

## 2024-11-08 PROCEDURE — 80048 BASIC METABOLIC PNL TOTAL CA: CPT

## 2024-11-08 PROCEDURE — 36415 COLL VENOUS BLD VENIPUNCTURE: CPT

## 2024-11-08 RX ORDER — DIPHENHYDRAMINE HYDROCHLORIDE 50 MG/ML
25 INJECTION INTRAMUSCULAR; INTRAVENOUS ONCE
Status: COMPLETED | OUTPATIENT
Start: 2024-11-08 | End: 2024-11-08

## 2024-11-08 RX ORDER — ROSUVASTATIN CALCIUM 40 MG/1
40 TABLET, COATED ORAL DAILY
Qty: 90 TABLET | Refills: 3 | Status: SHIPPED | OUTPATIENT
Start: 2024-11-08

## 2024-11-08 RX ORDER — CARVEDILOL 12.5 MG/1
12.5 TABLET ORAL 2 TIMES DAILY
Qty: 180 TABLET | Refills: 3 | Status: SHIPPED | OUTPATIENT
Start: 2024-11-08

## 2024-11-08 RX ORDER — ACETAMINOPHEN 500 MG
1000 TABLET ORAL ONCE
Status: COMPLETED | OUTPATIENT
Start: 2024-11-08 | End: 2024-11-08

## 2024-11-08 RX ORDER — PROCHLORPERAZINE EDISYLATE 5 MG/ML
10 INJECTION INTRAMUSCULAR; INTRAVENOUS ONCE
Status: COMPLETED | OUTPATIENT
Start: 2024-11-08 | End: 2024-11-08

## 2024-11-08 RX ADMIN — PROCHLORPERAZINE EDISYLATE 10 MG: 5 INJECTION INTRAMUSCULAR; INTRAVENOUS at 09:43

## 2024-11-08 RX ADMIN — DIPHENHYDRAMINE HYDROCHLORIDE 25 MG: 50 INJECTION INTRAMUSCULAR; INTRAVENOUS at 09:43

## 2024-11-08 RX ADMIN — PERFLUTREN 1.5 ML: 6.52 INJECTION, SUSPENSION INTRAVENOUS at 13:35

## 2024-11-08 RX ADMIN — ACETAMINOPHEN 1000 MG: 500 TABLET ORAL at 09:43

## 2024-11-08 ASSESSMENT — PAIN DESCRIPTION - LOCATION: LOCATION: HEAD

## 2024-11-08 ASSESSMENT — ENCOUNTER SYMPTOMS: SHORTNESS OF BREATH: 1

## 2024-11-08 ASSESSMENT — PAIN SCALES - GENERAL: PAINLEVEL_OUTOF10: 4

## 2024-11-08 NOTE — PROGRESS NOTES
I, Madhuri Manzano am scribing for and in the presence of Sofia Mar PA-C.    Patient: Tam Barr  : 1975  Date of Visit: 2024    REASON FOR VISIT / CONSULTATION: Follow-up (Hx:HTN,HLD,SOB,Fam Hx,Tobacco Abuse.)        History of Present Illness:        Dear Paul, Lucia Marmolejo MD    I had the pleasure of seeing Tam Barr in my office today. Mr. Barr is a 49 y.o. male with a history of difficult to control hypertension. He had stress test 18-20 years ago which led to pulmonology and that since has been resolved. He does have family history of heart disease in both parents, father with an open heart surgery and mother with stents x 4.. Treadmill stress test done on 2021 Duke treadmill score is 2 which correlates with intermediate risk. He smokes cigars, it varies but maybe about least twice a week, since college. EKG done in office on 2023 showed normal sinus rhythm. Stress test completed on 2023: Treadmill Myoview stress test conducted per protocol. Baseline ECG showed sinus rhythm with ST segment and T wave abnormalities in the inferolateral leads that precludes accurate ECG interpretation during exercise. Exercise duration was 7 minutes and 35 seconds.  Test terminated due to shortness of breath and leg fatigue no chest pain. Myocardial perfusion is mildly abnormal with a small inferior wall defect of mild intensity, probably  consistent with a stress-induced ischemia. Summed stress score is only 2. Heart cath and stent placed on 23: Dist RCA lesion, 99% stenosed.     Mr. Barr is here today to follow up after his heart cath and stenting. He had one stent placed and he reports he is feeling a lot better since then. He goes back to work tomorrow. He leaves and comes back  or . He said he will start cardiac rehab when he gets back from work. He denies any chest pain, pressure or tightness. He denies any light headed/dizziness,

## 2024-11-08 NOTE — ED NOTES
Received call from cardio.  Pt is scheduled by Kimmie at cardiology office for echo on Monday, Nov 11@ 1130 and then Tuesday, Nov 12 @ 0800.  Nurse, PORTILLO Clancy informed pt

## 2024-11-08 NOTE — PROGRESS NOTES
I, Laura Hernandez am scribing for and in the presence of Sofia Mar PA-C.    Patient: Tam Barr  : 1975  Date of Visit: 2024    REASON FOR VISIT / CONSULTATION: Follow-up (Hx:HTN,HLD,SOB,Fam Hx,Tobacco Abuse. Pt is here for follow up for chest pain and cardiac clearance. He reports he has had headaches and high blood pressure for months. He gets chest pain and sob for 2 months )      History of Present Illness:        Dear Paul, Lucia Marmolejo MD    I had the pleasure of seeing Tam Barr in my office today. Mr. Barr is a 49 y.o. male with a history of difficult to control hypertension. He had stress test 18-20 years ago which led to pulmonology and that since has been resolved. He does have family history of heart disease in both parents, father with an open heart surgery and mother with stents x 4.. Treadmill stress test done on 2021 Duke treadmill score is 2 which correlates with intermediate risk. He smokes cigars, it varies but maybe about least twice a week, since college. EKG done in office on 2023 showed normal sinus rhythm. Stress test completed on 2023: Treadmill Myoview stress test conducted per protocol. Baseline ECG showed sinus rhythm with ST segment and T wave abnormalities in the inferolateral leads that precludes accurate ECG interpretation during exercise. Exercise duration was 7 minutes and 35 seconds.  Test terminated due to shortness of breath and leg fatigue no chest pain. Myocardial perfusion is mildly abnormal with a small inferior wall defect of mild intensity, probably  consistent with a stress-induced ischemia. Summed stress score is only 2. Heart cath and stent placed on 23: Dist RCA lesion, 99% stenosed. EKG done in office 24: Normal Sinus Rhythm     Mr. Barr is here today for a yearly follow up. He reports he is doing worse over the past 2 months. He has been having chest pain that lasts minutes and headaches for

## 2024-11-08 NOTE — DISCHARGE INSTRUCTIONS
You have been scheduled for stress testing on Monday, Nov 11@ 1130 and then Tuesday, Nov 12 @ 0800.     Please arrive at least one hour prior to check in and prepare for the procedure.     Please increase the Coreg to 12.5mg as instructed by your cardiology team.     For pain use ibuprofen (Motrin / Advil) or acetaminophen (Tylenol), unless prescribed medications that have acetaminophen in it.  You can take over the counter acetaminophen tablets (1 - 2 tablets of the 500-mg strength every 6 hours) or ibuprofen tablets (2 tablets every 4 hours).    PLEASE RETURN TO THE EMERGENCY DEPARTMENT IMMEDIATELY for worsening symptoms of increasing pain, shortness of breath, feeling of your heart fluttering or racing, swelling to your feet, unable to lay flat, or if you develop any concerning symptoms such as: high fever not relieved by acetaminophen (Tylenol) and/or ibuprofen (Motrin / Advil), chills, persistent nausea and/or vomiting, loss of consciousness, numbness, weakness or tingling in the arms or legs or change in color of the extremities, changes in mental status, persistent headache, blurry vision, loss of bladder / bowel control, unable to follow up with your physician, or other any other care or concern.

## 2024-11-08 NOTE — ED NOTES
Contacted cardiology request echo be read.  Also per Dr. Carlos, requested Kimmie schedule stress test for pt and call us back with time so we may inform patient.

## 2024-11-08 NOTE — ED PROVIDER NOTES
following components:    MCHC 35.0 (*)     All other components within normal limits   HEPATIC FUNCTION PANEL - Abnormal; Notable for the following components:    ALT 69 (*)     All other components within normal limits   TROPONIN   TROPONIN       All other labs were within normal range or not returned as of this dictation.    EMERGENCY DEPARTMENT COURSE and DIFFERENTIAL DIAGNOSIS/MDM:     Medical Decision Making  This is a 49-year-old male presents emergency department from cardiology clinic for evaluation of chest pain and headache.  Chest pain ongoing for the past 1 month, exertional.  Headache ongoing for the past 2 months.  Previous stenting in 2023 for RCA with 99% stenosis associated with similar symptoms including headache.  Will obtain cardiac workup at this time.  Stress testing arranged for today while in the emergency department.  No sign of focal neurologic deficit at this time.  Headache may be related to hypertensive emergency.  Will also obtain kidney and liver function to assess for sign of endorgan damage.     Amount and/or Complexity of Data Reviewed  Labs: ordered. Decision-making details documented in ED Course.  Radiology: ordered.  ECG/medicine tests: ordered.    Risk  OTC drugs.  Prescription drug management.           REASSESSMENT     ED Course as of 11/08/24 1625   Fri Nov 08, 2024   0958 Troponin, High Sensitivity: 14 [CP]   0959 Creatinine: 0.7  Normal Cr, no sign of end organ damage [CP]   0959 Hemoglobin Quant: 15.4  Normal hgb [CP]   1143 Spoke with Roxana from Cardiology team- discussed with Dr. Worthington, as long as no active chest pain, can plan for stress on Monday. Coreg increased to 12.5mg. [CP]   1148 Updated patient. Awaiting echo [CP]   1438 Prelim ECHO results:  Abnormal Result  Last updated: 11/8/2024 14:14  Status: In process  LV EDV A4C: 116 mL  LV ESV A4C: 53 mL  IVSd: 1.0 cm [Ref Range: 0.6 - 1.0]  LVIDd: 4.8 cm [Ref Range: 4.2 - 5.9]  LVIDs: 3.1 cm  LVOT Mean Gradient: 3  Detail Level: Zone Photo Preface (Leave Blank If You Do Not Want): Photographs were obtained today

## 2024-11-11 ENCOUNTER — TELEPHONE (OUTPATIENT)
Dept: PRIMARY CARE CLINIC | Age: 49
End: 2024-11-11

## 2024-11-11 NOTE — TELEPHONE ENCOUNTER
[facility] ED Follow up Call    Reason for ED visit:  Chest Pain      11/11/2024       Hi, this message is for Tam.  This is CATY PASCUAL MA from [facility] office.  Just calling to see how you are doing after your recent visit to the Emergency Room.  [facility] wants to make sure you were able to fill any prescriptions and that you understand your discharge instructions.  Please return our call if you need to make a follow up appointment with your provider or have any further needs.   Our phone number is [phone].  Have a great day.

## 2024-11-13 ENCOUNTER — OFFICE VISIT (OUTPATIENT)
Dept: PRIMARY CARE CLINIC | Age: 49
End: 2024-11-13

## 2024-11-13 ENCOUNTER — TELEPHONE (OUTPATIENT)
Dept: PRIMARY CARE CLINIC | Age: 49
End: 2024-11-13

## 2024-11-13 VITALS
BODY MASS INDEX: 39.29 KG/M2 | WEIGHT: 273.8 LBS | SYSTOLIC BLOOD PRESSURE: 150 MMHG | HEART RATE: 86 BPM | OXYGEN SATURATION: 98 % | DIASTOLIC BLOOD PRESSURE: 84 MMHG

## 2024-11-13 DIAGNOSIS — B02.8 HERPES ZOSTER WITH OTHER COMPLICATION: Primary | ICD-10-CM

## 2024-11-13 DIAGNOSIS — R73.01 IMPAIRED FASTING BLOOD SUGAR: ICD-10-CM

## 2024-11-13 DIAGNOSIS — H57.12 LEFT EYE PAIN: ICD-10-CM

## 2024-11-13 DIAGNOSIS — R51.9 ACUTE INTRACTABLE HEADACHE, UNSPECIFIED HEADACHE TYPE: ICD-10-CM

## 2024-11-13 DIAGNOSIS — I10 PRIMARY HYPERTENSION: ICD-10-CM

## 2024-11-13 DIAGNOSIS — H53.9 VISION CHANGES: ICD-10-CM

## 2024-11-13 RX ORDER — HYDROCHLOROTHIAZIDE 12.5 MG/1
12.5 TABLET ORAL DAILY
Qty: 90 TABLET | Refills: 0 | Status: SHIPPED | OUTPATIENT
Start: 2024-11-13 | End: 2025-02-11

## 2024-11-13 RX ORDER — VALACYCLOVIR HYDROCHLORIDE 1 G/1
1000 TABLET, FILM COATED ORAL 3 TIMES DAILY
Qty: 30 TABLET | Refills: 0 | Status: SHIPPED | OUTPATIENT
Start: 2024-11-13 | End: 2024-11-23

## 2024-11-13 RX ORDER — HYDROCHLOROTHIAZIDE 12.5 MG/1
12.5 TABLET ORAL DAILY
Qty: 60 TABLET | Refills: 0 | Status: SHIPPED | OUTPATIENT
Start: 2024-11-13 | End: 2024-11-13 | Stop reason: SDUPTHER

## 2024-11-13 ASSESSMENT — PATIENT HEALTH QUESTIONNAIRE - PHQ9
3. TROUBLE FALLING OR STAYING ASLEEP: NOT AT ALL
7. TROUBLE CONCENTRATING ON THINGS, SUCH AS READING THE NEWSPAPER OR WATCHING TELEVISION: NOT AT ALL
4. FEELING TIRED OR HAVING LITTLE ENERGY: NOT AT ALL
8. MOVING OR SPEAKING SO SLOWLY THAT OTHER PEOPLE COULD HAVE NOTICED. OR THE OPPOSITE, BEING SO FIGETY OR RESTLESS THAT YOU HAVE BEEN MOVING AROUND A LOT MORE THAN USUAL: NOT AT ALL
10. IF YOU CHECKED OFF ANY PROBLEMS, HOW DIFFICULT HAVE THESE PROBLEMS MADE IT FOR YOU TO DO YOUR WORK, TAKE CARE OF THINGS AT HOME, OR GET ALONG WITH OTHER PEOPLE: NOT DIFFICULT AT ALL
2. FEELING DOWN, DEPRESSED OR HOPELESS: NOT AT ALL
SUM OF ALL RESPONSES TO PHQ QUESTIONS 1-9: 0
6. FEELING BAD ABOUT YOURSELF - OR THAT YOU ARE A FAILURE OR HAVE LET YOURSELF OR YOUR FAMILY DOWN: NOT AT ALL
5. POOR APPETITE OR OVEREATING: NOT AT ALL
9. THOUGHTS THAT YOU WOULD BE BETTER OFF DEAD, OR OF HURTING YOURSELF: NOT AT ALL
SUM OF ALL RESPONSES TO PHQ QUESTIONS 1-9: 0
SUM OF ALL RESPONSES TO PHQ QUESTIONS 1-9: 0
1. LITTLE INTEREST OR PLEASURE IN DOING THINGS: NOT AT ALL
SUM OF ALL RESPONSES TO PHQ9 QUESTIONS 1 & 2: 0
SUM OF ALL RESPONSES TO PHQ QUESTIONS 1-9: 0

## 2024-11-13 NOTE — PROGRESS NOTES
[facility]      Patient:  Tam Barr 49 y.o. male     Date of Service: 11/13/24        Chief Complaint:   Chief Complaint   Patient presents with    Eye Problem     Having vision changes-twitching, pains in neck that go from eyes down to neck. Stated he not sure if it is from medication changes.          History of Present Illness     Follow-up on hypertension, overall uncontrolled.  Follows with cardiology.  Does check home ambulatory blood pressure and readings are still consistently over 140.  Asymptomatic with no CP/SOB.    Also with concerns of pain/burning on the left side of his face, scalp as well as left neck.  He does have some new lesions present over the scalp as well.  Does not recall any specific inciting events.  Does not recall any possible changes recently medication that may have caused it.  He does endorse changes in his vision and increasing blurry vision recently.  He has reported also pain with extraocular movements and some mild eyelid swelling/redness. No fnd      Allergies:    Patient has no known allergies.    Medication List:    Current Outpatient Medications   Medication Sig Dispense Refill    carvedilol (COREG) 12.5 MG tablet Take 1 tablet by mouth 2 times daily 180 tablet 3    rosuvastatin (CRESTOR) 40 MG tablet Take 1 tablet by mouth daily 90 tablet 3    amLODIPine (NORVASC) 10 MG tablet Take 1 tablet by mouth daily 90 tablet 3    clopidogrel (PLAVIX) 75 MG tablet Take 1 tablet by mouth daily 90 tablet 3    ezetimibe (ZETIA) 10 MG tablet Take 1 tablet by mouth daily 90 tablet 3    lisinopril (PRINIVIL;ZESTRIL) 40 MG tablet Take 1 tablet by mouth daily 90 tablet 3    aspirin 81 MG EC tablet Take 1 tablet by mouth daily 90 tablet 3    diclofenac sodium (VOLTAREN) 1 % GEL Apply 4 g topically 4 times daily 50 g 0    glipiZIDE (GLUCOTROL) 5 MG tablet Take 1 tablet by mouth 2 times daily 180 tablet 1    lamoTRIgine (LAMICTAL) 200 MG tablet Take 1 tablet by

## 2024-11-13 NOTE — TELEPHONE ENCOUNTER
Pt is trying to get his testing scheduled but drives back and forth to [place] for work, patient wondering if he can be written off work for a week so he's able to get testing done, plus he's being treated for shingles.     Ok to provide letter for one week off?

## 2024-11-20 ENCOUNTER — HOSPITAL ENCOUNTER (OUTPATIENT)
Age: 49
Discharge: HOME | End: 2024-11-20
Payer: COMMERCIAL

## 2024-11-20 DIAGNOSIS — I10 PRIMARY HYPERTENSION: ICD-10-CM

## 2024-11-20 DIAGNOSIS — R73.01 IMPAIRED FASTING BLOOD SUGAR: ICD-10-CM

## 2024-11-20 DIAGNOSIS — K76.0 HEPATIC STEATOSIS: ICD-10-CM

## 2024-11-20 DIAGNOSIS — E78.2 MIXED HYPERLIPIDEMIA: ICD-10-CM

## 2024-11-20 DIAGNOSIS — E11.9 TYPE 2 DIABETES MELLITUS WITHOUT COMPLICATION, WITHOUT LONG-TERM CURRENT USE OF INSULIN (HCC): ICD-10-CM

## 2024-11-20 LAB
ANION GAP SERPL CALCULATED.3IONS-SCNC: 11 MMOL/L (ref 9–16)
BUN SERPL-MCNC: 12 MG/DL (ref 6–20)
BUN/CREAT SERPL: 17 (ref 9–20)
CALCIUM SERPL-MCNC: 9.3 MG/DL (ref 8.6–10.4)
CHLORIDE SERPL-SCNC: 101 MMOL/L (ref 98–107)
CO2 SERPL-SCNC: 25 MMOL/L (ref 20–31)
CREAT SERPL-MCNC: 0.7 MG/DL (ref 0.7–1.2)
EST. AVERAGE GLUCOSE BLD GHB EST-MCNC: 143 MG/DL
GFR, ESTIMATED: >90 ML/MIN/1.73M2
GLUCOSE SERPL-MCNC: 110 MG/DL (ref 74–99)
HBA1C MFR BLD: 6.6 % (ref 4–6)
POTASSIUM SERPL-SCNC: 4.8 MMOL/L (ref 3.7–5.3)
SODIUM SERPL-SCNC: 137 MMOL/L (ref 136–145)

## 2024-11-20 PROCEDURE — 36415 COLL VENOUS BLD VENIPUNCTURE: CPT

## 2024-11-20 PROCEDURE — 80048 BASIC METABOLIC PNL TOTAL CA: CPT

## 2024-11-20 PROCEDURE — 83036 HEMOGLOBIN GLYCOSYLATED A1C: CPT

## 2024-11-26 ENCOUNTER — OFFICE VISIT (OUTPATIENT)
Dept: PRIMARY CARE CLINIC | Age: 49
End: 2024-11-26

## 2024-11-26 VITALS — WEIGHT: 272 LBS | OXYGEN SATURATION: 97 % | BODY MASS INDEX: 39.03 KG/M2 | HEART RATE: 94 BPM

## 2024-11-26 DIAGNOSIS — B02.8 HERPES ZOSTER WITH OTHER COMPLICATION: ICD-10-CM

## 2024-11-26 DIAGNOSIS — I10 PRIMARY HYPERTENSION: ICD-10-CM

## 2024-11-26 DIAGNOSIS — E11.9 TYPE 2 DIABETES MELLITUS WITHOUT COMPLICATION, WITHOUT LONG-TERM CURRENT USE OF INSULIN (HCC): ICD-10-CM

## 2024-11-26 DIAGNOSIS — I25.10 CAD IN NATIVE ARTERY: Primary | ICD-10-CM

## 2024-11-26 DIAGNOSIS — E78.2 MIXED HYPERLIPIDEMIA: ICD-10-CM

## 2024-11-26 DIAGNOSIS — F31.81 BIPOLAR 2 DISORDER (HCC): ICD-10-CM

## 2024-11-26 DIAGNOSIS — H57.12 LEFT EYE PAIN: ICD-10-CM

## 2024-11-26 RX ORDER — UBIDECARENONE 30 MG
1 CAPSULE ORAL DAILY
Qty: 90 CAPSULE | Refills: 0 | Status: SHIPPED | OUTPATIENT
Start: 2024-11-26 | End: 2025-02-24

## 2024-11-26 RX ORDER — LAMOTRIGINE 200 MG/1
200 TABLET ORAL DAILY
Qty: 90 TABLET | Refills: 1 | Status: SHIPPED | OUTPATIENT
Start: 2024-11-26 | End: 2025-05-25

## 2024-11-26 RX ORDER — HYDROCHLOROTHIAZIDE 25 MG/1
25 TABLET ORAL DAILY
Qty: 90 TABLET | Refills: 0 | Status: SHIPPED | OUTPATIENT
Start: 2024-11-26 | End: 2025-02-24

## 2024-11-26 RX ORDER — GLIPIZIDE 5 MG/1
5 TABLET ORAL 2 TIMES DAILY
Qty: 180 TABLET | Refills: 1 | Status: SHIPPED | OUTPATIENT
Start: 2024-11-26

## 2024-11-26 RX ORDER — HYDROCHLOROTHIAZIDE 12.5 MG/1
12.5 TABLET ORAL DAILY
Qty: 90 TABLET | Refills: 0 | Status: SHIPPED | OUTPATIENT
Start: 2024-11-26 | End: 2024-11-26

## 2024-11-26 RX ORDER — LURASIDONE HYDROCHLORIDE 20 MG/1
20 TABLET, FILM COATED ORAL
Qty: 90 TABLET | Refills: 1 | Status: SHIPPED | OUTPATIENT
Start: 2024-11-26 | End: 2025-05-25

## 2024-11-26 NOTE — PROGRESS NOTES
mouth daily 90 tablet 3    ezetimibe (ZETIA) 10 MG tablet Take 1 tablet by mouth daily 90 tablet 3    lisinopril (PRINIVIL;ZESTRIL) 40 MG tablet Take 1 tablet by mouth daily 90 tablet 3    aspirin 81 MG EC tablet Take 1 tablet by mouth daily 90 tablet 3    diclofenac sodium (VOLTAREN) 1 % GEL Apply 4 g topically 4 times daily 50 g 0    imiquimod (ALDARA) 5 % cream Apply topically three times a week Apply topically three times a week for up 16 weeks 24 each 1    clotrimazole (LOTRIMIN AF) 1 % cream Apply topically 2 times daily Apply topically 2 times daily. 85 g 1    ketoconazole (NIZORAL) 2 % shampoo Apply topically daily as needed. 100 mL 0    blood glucose monitor kit and supplies Dispense sufficient amount for indicated testing frequency plus additional to accommodate PRN testing needs. Dispense all needed supplies to include: monitor, strips, lancing device, lancets, control solutions, alcohol swabs. 1 kit 0     No current facility-administered medications for this visit.          Review of Systems   See hpi  Physical Exam   Physical Exam  Constitutional:       Appearance: Well-developed.   HENT:      Head: Normocephalic.      Right Ear: External ear normal.      Left Ear: External ear normal  There is mild soft tissue swelling of the upper eyelid with a erythematous lesion approximately 0.3 cm in size that is slightly raised with a healing granulation tissue.  There are scattered erythematous lesions approximately 0.3 cm in size, circumferential with healing granulation tissue scattered over the top scalp  No conjunctival injection  Cardiovascular:      Rate and Rhythm: Normal rate and regular rhythm.      Heart sounds: Normal heart sounds. No murmur heard.  Pulmonary:      Effort: Pulmonary effort is normal.      Breath sounds: Normal breath sounds. No wheezing.   Musculoskeletal:         General: Normal range of motion.      Cervical back: Normal range of motion.   Skin:     General: Skin is warm.

## 2025-01-17 ENCOUNTER — OFFICE VISIT (OUTPATIENT)
Dept: CARDIOLOGY | Age: 50
End: 2025-01-17
Payer: COMMERCIAL

## 2025-01-17 VITALS
OXYGEN SATURATION: 98 % | WEIGHT: 271.6 LBS | DIASTOLIC BLOOD PRESSURE: 81 MMHG | BODY MASS INDEX: 36.79 KG/M2 | HEART RATE: 86 BPM | HEIGHT: 72 IN | SYSTOLIC BLOOD PRESSURE: 142 MMHG | RESPIRATION RATE: 18 BRPM

## 2025-01-17 DIAGNOSIS — I10 PRIMARY HYPERTENSION: ICD-10-CM

## 2025-01-17 DIAGNOSIS — I25.10 ASHD (ARTERIOSCLEROTIC HEART DISEASE): Primary | ICD-10-CM

## 2025-01-17 DIAGNOSIS — R42 DIZZINESS: ICD-10-CM

## 2025-01-17 DIAGNOSIS — E66.812 CLASS 2 OBESITY DUE TO EXCESS CALORIES WITH BODY MASS INDEX (BMI) OF 36.0 TO 36.9 IN ADULT, UNSPECIFIED WHETHER SERIOUS COMORBIDITY PRESENT: ICD-10-CM

## 2025-01-17 DIAGNOSIS — Z95.5 HISTORY OF HEART ARTERY STENT: ICD-10-CM

## 2025-01-17 DIAGNOSIS — E78.2 MIXED HYPERLIPIDEMIA: ICD-10-CM

## 2025-01-17 DIAGNOSIS — R00.2 PALPITATIONS: ICD-10-CM

## 2025-01-17 DIAGNOSIS — R06.02 SOB (SHORTNESS OF BREATH): ICD-10-CM

## 2025-01-17 DIAGNOSIS — E66.09 CLASS 2 OBESITY DUE TO EXCESS CALORIES WITH BODY MASS INDEX (BMI) OF 36.0 TO 36.9 IN ADULT, UNSPECIFIED WHETHER SERIOUS COMORBIDITY PRESENT: ICD-10-CM

## 2025-01-17 DIAGNOSIS — R07.89 CHEST DISCOMFORT: ICD-10-CM

## 2025-01-17 PROCEDURE — 3077F SYST BP >= 140 MM HG: CPT | Performed by: FAMILY MEDICINE

## 2025-01-17 PROCEDURE — 1036F TOBACCO NON-USER: CPT | Performed by: FAMILY MEDICINE

## 2025-01-17 PROCEDURE — 3079F DIAST BP 80-89 MM HG: CPT | Performed by: FAMILY MEDICINE

## 2025-01-17 PROCEDURE — G8417 CALC BMI ABV UP PARAM F/U: HCPCS | Performed by: FAMILY MEDICINE

## 2025-01-17 PROCEDURE — 93000 ELECTROCARDIOGRAM COMPLETE: CPT | Performed by: FAMILY MEDICINE

## 2025-01-17 PROCEDURE — 99214 OFFICE O/P EST MOD 30 MIN: CPT | Performed by: FAMILY MEDICINE

## 2025-01-17 PROCEDURE — 3017F COLORECTAL CA SCREEN DOC REV: CPT | Performed by: FAMILY MEDICINE

## 2025-01-17 PROCEDURE — G8427 DOCREV CUR MEDS BY ELIG CLIN: HCPCS | Performed by: FAMILY MEDICINE

## 2025-01-17 RX ORDER — ATORVASTATIN CALCIUM 80 MG/1
80 TABLET, FILM COATED ORAL DAILY
COMMUNITY
End: 2025-01-17 | Stop reason: ALTCHOICE

## 2025-01-17 RX ORDER — LOSARTAN POTASSIUM AND HYDROCHLOROTHIAZIDE 12.5; 5 MG/1; MG/1
1 TABLET ORAL DAILY
Qty: 90 TABLET | Refills: 3 | Status: CANCELLED | OUTPATIENT
Start: 2025-01-17

## 2025-01-17 RX ORDER — HYDROCHLOROTHIAZIDE 12.5 MG/1
12.5 TABLET ORAL DAILY
COMMUNITY
Start: 2024-12-12 | End: 2025-01-17 | Stop reason: ALTCHOICE

## 2025-01-17 RX ORDER — CARVEDILOL 12.5 MG/1
12.5 TABLET ORAL 2 TIMES DAILY
Qty: 180 TABLET | Refills: 3 | Status: SHIPPED | OUTPATIENT
Start: 2025-01-17

## 2025-01-17 RX ORDER — VALSARTAN AND HYDROCHLOROTHIAZIDE 160; 12.5 MG/1; MG/1
1 TABLET, FILM COATED ORAL DAILY
Qty: 90 TABLET | Refills: 3 | Status: SHIPPED | OUTPATIENT
Start: 2025-01-17

## 2025-01-17 NOTE — PROGRESS NOTES
tablet 3    clopidogrel (PLAVIX) 75 MG tablet Take 1 tablet by mouth daily 90 tablet 3    ezetimibe (ZETIA) 10 MG tablet Take 1 tablet by mouth daily 90 tablet 3    lisinopril (PRINIVIL;ZESTRIL) 40 MG tablet Take 1 tablet by mouth daily 90 tablet 3    aspirin 81 MG EC tablet Take 1 tablet by mouth daily 90 tablet 3       FAMILY HISTORY: family history includes COPD in his mother; Colon Cancer in his paternal grandfather; Congenital Heart Disease in his father; Coronary Art Dis in his father and mother; Diabetes in his mother; Irritable Bowel Syndrome in his father and paternal grandfather.     Physical Examination:      BP (!) 142/81 (Site: Left Upper Arm, Position: Sitting, Cuff Size: Large Adult)   Pulse 86   Resp 18   Ht 1.829 m (6')   Wt 123.2 kg (271 lb 9.6 oz)   SpO2 98%   BMI 36.84 kg/m²  Body mass index is 36.84 kg/m².    Constitutional: He is oriented to person. He appears well-developed and well-nourished. In no acute distress.   HEENT: Normocephalic and atraumatic.No JVD present. Carotid bruit is not present. No mass and no thyromegaly present. No lymphadenopathy present.  Cardiovascular: Normal rate, regular rhythm, normal heart sounds. Exam reveals no gallop and no friction rubs. 1/6 systolic murmur, 5th intercostal space on the LEFT in the mid-clavicular line (cardiac apex).   Pulmonary/Chest: Effort normal and breath sounds normal. No respiratory distress. He has no wheezes, rhonchi or rales. Abdominal: Soft, non-tender. Bowel sounds and aorta are normal. He exhibits no organomegaly, mass or bruit.   Extremities: None. No cyanosis or clubbing. 2+ radial and carotid pulses. Distal extremity pulses: 2+ bilaterally.  Neurological: He is alert and oriented to person. No evidence of gross cranial nerve deficit. Coordination appeared normal.   Skin: Skin is warm and dry. There is no rash or diaphoresis.   Psychiatric: He has a normal mood and affect. His speech is normal and behavior is normal.

## 2025-01-24 ENCOUNTER — HOSPITAL ENCOUNTER (OUTPATIENT)
Age: 50
Discharge: HOME | End: 2025-01-26
Payer: COMMERCIAL

## 2025-01-24 ENCOUNTER — HOSPITAL ENCOUNTER (OUTPATIENT)
Dept: NUCLEAR MEDICINE | Age: 50
Discharge: HOME | End: 2025-01-26
Payer: COMMERCIAL

## 2025-01-24 DIAGNOSIS — E78.2 MIXED HYPERLIPIDEMIA: ICD-10-CM

## 2025-01-24 DIAGNOSIS — I25.10 CORONARY ARTERY DISEASE INVOLVING NATIVE CORONARY ARTERY OF NATIVE HEART WITHOUT ANGINA PECTORIS: ICD-10-CM

## 2025-01-24 DIAGNOSIS — Z82.49 FAMILY HISTORY OF HEART DISEASE: ICD-10-CM

## 2025-01-24 DIAGNOSIS — R00.2 PALPITATIONS: ICD-10-CM

## 2025-01-24 DIAGNOSIS — R06.02 SOB (SHORTNESS OF BREATH): ICD-10-CM

## 2025-01-24 DIAGNOSIS — E66.812 CLASS 2 OBESITY WITH BODY MASS INDEX (BMI) OF 35.0 TO 35.9 IN ADULT, UNSPECIFIED OBESITY TYPE, UNSPECIFIED WHETHER SERIOUS COMORBIDITY PRESENT: ICD-10-CM

## 2025-01-24 DIAGNOSIS — E11.69 TYPE 2 DIABETES MELLITUS WITH OTHER SPECIFIED COMPLICATION, UNSPECIFIED WHETHER LONG TERM INSULIN USE (HCC): ICD-10-CM

## 2025-01-24 DIAGNOSIS — Z95.5 HISTORY OF HEART ARTERY STENT: ICD-10-CM

## 2025-01-24 DIAGNOSIS — R94.39 ABNORMAL STRESS ECG: ICD-10-CM

## 2025-01-24 DIAGNOSIS — I10 PRIMARY HYPERTENSION: ICD-10-CM

## 2025-01-24 DIAGNOSIS — Z87.891 HISTORY OF TOBACCO ABUSE: ICD-10-CM

## 2025-01-24 DIAGNOSIS — R42 DIZZINESS: ICD-10-CM

## 2025-01-24 PROCEDURE — 78452 HT MUSCLE IMAGE SPECT MULT: CPT | Performed by: FAMILY MEDICINE

## 2025-01-24 PROCEDURE — 93018 CV STRESS TEST I&R ONLY: CPT | Performed by: FAMILY MEDICINE

## 2025-01-24 PROCEDURE — 3430000000 HC RX DIAGNOSTIC RADIOPHARMACEUTICAL: Performed by: PHYSICIAN ASSISTANT

## 2025-01-24 PROCEDURE — 6360000002 HC RX W HCPCS: Performed by: FAMILY MEDICINE

## 2025-01-24 PROCEDURE — 78452 HT MUSCLE IMAGE SPECT MULT: CPT

## 2025-01-24 PROCEDURE — A9500 TC99M SESTAMIBI: HCPCS | Performed by: PHYSICIAN ASSISTANT

## 2025-01-24 PROCEDURE — 93016 CV STRESS TEST SUPVJ ONLY: CPT | Performed by: FAMILY MEDICINE

## 2025-01-24 RX ORDER — REGADENOSON 0.08 MG/ML
0.4 INJECTION, SOLUTION INTRAVENOUS
Status: COMPLETED | OUTPATIENT
Start: 2025-01-24 | End: 2025-01-24

## 2025-01-24 RX ORDER — TETRAKIS(2-METHOXYISOBUTYLISOCYANIDE)COPPER(I) TETRAFLUOROBORATE 1 MG/ML
30 INJECTION, POWDER, LYOPHILIZED, FOR SOLUTION INTRAVENOUS
Status: COMPLETED | OUTPATIENT
Start: 2025-01-24 | End: 2025-01-24

## 2025-01-24 RX ADMIN — Medication 30 MILLICURIE: at 10:26

## 2025-01-24 RX ADMIN — REGADENOSON 0.4 MG: 0.08 INJECTION, SOLUTION INTRAVENOUS at 09:49

## 2025-01-27 ENCOUNTER — HOSPITAL ENCOUNTER (OUTPATIENT)
Age: 50
Discharge: HOME | End: 2025-01-29
Payer: COMMERCIAL

## 2025-01-27 ENCOUNTER — HOSPITAL ENCOUNTER (OUTPATIENT)
Dept: NUCLEAR MEDICINE | Age: 50
Discharge: HOME | End: 2025-01-29
Payer: COMMERCIAL

## 2025-01-27 DIAGNOSIS — Z95.5 HISTORY OF HEART ARTERY STENT: ICD-10-CM

## 2025-01-27 DIAGNOSIS — E78.2 MIXED HYPERLIPIDEMIA: ICD-10-CM

## 2025-01-27 DIAGNOSIS — R00.2 PALPITATIONS: ICD-10-CM

## 2025-01-27 DIAGNOSIS — I25.10 CORONARY ARTERY DISEASE INVOLVING NATIVE CORONARY ARTERY OF NATIVE HEART WITHOUT ANGINA PECTORIS: ICD-10-CM

## 2025-01-27 DIAGNOSIS — Z87.891 HISTORY OF TOBACCO ABUSE: ICD-10-CM

## 2025-01-27 DIAGNOSIS — R42 DIZZINESS: ICD-10-CM

## 2025-01-27 DIAGNOSIS — R94.39 ABNORMAL STRESS ECG: ICD-10-CM

## 2025-01-27 DIAGNOSIS — Z82.49 FAMILY HISTORY OF HEART DISEASE: ICD-10-CM

## 2025-01-27 DIAGNOSIS — E66.812 CLASS 2 OBESITY WITH BODY MASS INDEX (BMI) OF 35.0 TO 35.9 IN ADULT, UNSPECIFIED OBESITY TYPE, UNSPECIFIED WHETHER SERIOUS COMORBIDITY PRESENT: ICD-10-CM

## 2025-01-27 DIAGNOSIS — I10 PRIMARY HYPERTENSION: ICD-10-CM

## 2025-01-27 DIAGNOSIS — R06.02 SOB (SHORTNESS OF BREATH): ICD-10-CM

## 2025-01-27 DIAGNOSIS — E11.69 TYPE 2 DIABETES MELLITUS WITH OTHER SPECIFIED COMPLICATION, UNSPECIFIED WHETHER LONG TERM INSULIN USE (HCC): ICD-10-CM

## 2025-01-27 LAB
NUC STRESS EJECTION FRACTION: 65 %
STRESS BASELINE DIAS BP: 92 MMHG
STRESS BASELINE HR: 81 BPM
STRESS BASELINE SYS BP: 148 MMHG
STRESS ESTIMATED WORKLOAD: 1 METS
STRESS PEAK DIAS BP: 80 MMHG
STRESS PEAK SYS BP: 124 MMHG
STRESS PERCENT HR ACHIEVED: 64 %
STRESS POST PEAK HR: 109 BPM
STRESS RATE PRESSURE PRODUCT: NORMAL BPM*MMHG
STRESS TARGET HR: 170 BPM

## 2025-01-27 PROCEDURE — 3430000000 HC RX DIAGNOSTIC RADIOPHARMACEUTICAL: Performed by: PHYSICIAN ASSISTANT

## 2025-01-27 PROCEDURE — A9500 TC99M SESTAMIBI: HCPCS | Performed by: PHYSICIAN ASSISTANT

## 2025-01-27 PROCEDURE — 93243 EXT ECG>48HR<7D SCAN A/R: CPT

## 2025-01-27 RX ORDER — TETRAKIS(2-METHOXYISOBUTYLISOCYANIDE)COPPER(I) TETRAFLUOROBORATE 1 MG/ML
30 INJECTION, POWDER, LYOPHILIZED, FOR SOLUTION INTRAVENOUS
Status: COMPLETED | OUTPATIENT
Start: 2025-01-27 | End: 2025-01-27

## 2025-01-27 RX ADMIN — Medication 30 MILLICURIE: at 13:09

## 2025-01-28 ENCOUNTER — TELEPHONE (OUTPATIENT)
Dept: CARDIOLOGY | Age: 50
End: 2025-01-28

## 2025-01-28 NOTE — TELEPHONE ENCOUNTER
----- Message from Sofia Mar PA-C sent at 1/28/2025 12:32 PM EST -----  Please let them know their stress test looked good, it was low risk. Will discuss at follow up. Thanks.

## 2025-02-03 ENCOUNTER — OFFICE VISIT (OUTPATIENT)
Dept: PRIMARY CARE CLINIC | Age: 50
End: 2025-02-03
Payer: COMMERCIAL

## 2025-02-03 VITALS
OXYGEN SATURATION: 97 % | WEIGHT: 281 LBS | HEART RATE: 78 BPM | BODY MASS INDEX: 38.11 KG/M2 | DIASTOLIC BLOOD PRESSURE: 76 MMHG | SYSTOLIC BLOOD PRESSURE: 138 MMHG

## 2025-02-03 DIAGNOSIS — I25.10 CAD IN NATIVE ARTERY: ICD-10-CM

## 2025-02-03 DIAGNOSIS — E78.2 MIXED HYPERLIPIDEMIA: ICD-10-CM

## 2025-02-03 DIAGNOSIS — I10 PRIMARY HYPERTENSION: Primary | ICD-10-CM

## 2025-02-03 DIAGNOSIS — E11.9 TYPE 2 DIABETES MELLITUS WITHOUT COMPLICATION, WITHOUT LONG-TERM CURRENT USE OF INSULIN (HCC): ICD-10-CM

## 2025-02-03 DIAGNOSIS — F31.81 BIPOLAR 2 DISORDER (HCC): ICD-10-CM

## 2025-02-03 PROCEDURE — 3075F SYST BP GE 130 - 139MM HG: CPT | Performed by: FAMILY MEDICINE

## 2025-02-03 PROCEDURE — G8417 CALC BMI ABV UP PARAM F/U: HCPCS | Performed by: FAMILY MEDICINE

## 2025-02-03 PROCEDURE — G8427 DOCREV CUR MEDS BY ELIG CLIN: HCPCS | Performed by: FAMILY MEDICINE

## 2025-02-03 PROCEDURE — 1036F TOBACCO NON-USER: CPT | Performed by: FAMILY MEDICINE

## 2025-02-03 PROCEDURE — 3017F COLORECTAL CA SCREEN DOC REV: CPT | Performed by: FAMILY MEDICINE

## 2025-02-03 PROCEDURE — 3046F HEMOGLOBIN A1C LEVEL >9.0%: CPT | Performed by: FAMILY MEDICINE

## 2025-02-03 PROCEDURE — 3078F DIAST BP <80 MM HG: CPT | Performed by: FAMILY MEDICINE

## 2025-02-03 PROCEDURE — G2211 COMPLEX E/M VISIT ADD ON: HCPCS | Performed by: FAMILY MEDICINE

## 2025-02-03 PROCEDURE — 99214 OFFICE O/P EST MOD 30 MIN: CPT | Performed by: FAMILY MEDICINE

## 2025-02-03 PROCEDURE — 2022F DILAT RTA XM EVC RTNOPTHY: CPT | Performed by: FAMILY MEDICINE

## 2025-02-03 RX ORDER — GLIPIZIDE 5 MG/1
5 TABLET ORAL 2 TIMES DAILY
Qty: 180 TABLET | Refills: 1 | Status: SHIPPED | OUTPATIENT
Start: 2025-02-03

## 2025-02-03 RX ORDER — LURASIDONE HYDROCHLORIDE 20 MG/1
20 TABLET, FILM COATED ORAL
Qty: 90 TABLET | Refills: 1 | Status: SHIPPED | OUTPATIENT
Start: 2025-02-03 | End: 2025-08-02

## 2025-02-03 RX ORDER — GLIPIZIDE 5 MG/1
5 TABLET ORAL 2 TIMES DAILY
Qty: 180 TABLET | Refills: 1 | Status: CANCELLED | OUTPATIENT
Start: 2025-02-03

## 2025-02-03 RX ORDER — LAMOTRIGINE 200 MG/1
200 TABLET ORAL DAILY
Qty: 90 TABLET | Refills: 1 | Status: SHIPPED | OUTPATIENT
Start: 2025-02-03 | End: 2025-08-02

## 2025-02-03 SDOH — ECONOMIC STABILITY: FOOD INSECURITY: WITHIN THE PAST 12 MONTHS, THE FOOD YOU BOUGHT JUST DIDN'T LAST AND YOU DIDN'T HAVE MONEY TO GET MORE.: NEVER TRUE

## 2025-02-03 SDOH — ECONOMIC STABILITY: FOOD INSECURITY: WITHIN THE PAST 12 MONTHS, YOU WORRIED THAT YOUR FOOD WOULD RUN OUT BEFORE YOU GOT MONEY TO BUY MORE.: NEVER TRUE

## 2025-02-03 ASSESSMENT — PATIENT HEALTH QUESTIONNAIRE - PHQ9
SUM OF ALL RESPONSES TO PHQ QUESTIONS 1-9: 0
SUM OF ALL RESPONSES TO PHQ QUESTIONS 1-9: 0
9. THOUGHTS THAT YOU WOULD BE BETTER OFF DEAD, OR OF HURTING YOURSELF: NOT AT ALL
2. FEELING DOWN, DEPRESSED OR HOPELESS: NOT AT ALL
3. TROUBLE FALLING OR STAYING ASLEEP: NOT AT ALL
10. IF YOU CHECKED OFF ANY PROBLEMS, HOW DIFFICULT HAVE THESE PROBLEMS MADE IT FOR YOU TO DO YOUR WORK, TAKE CARE OF THINGS AT HOME, OR GET ALONG WITH OTHER PEOPLE: NOT DIFFICULT AT ALL
8. MOVING OR SPEAKING SO SLOWLY THAT OTHER PEOPLE COULD HAVE NOTICED. OR THE OPPOSITE, BEING SO FIGETY OR RESTLESS THAT YOU HAVE BEEN MOVING AROUND A LOT MORE THAN USUAL: NOT AT ALL
6. FEELING BAD ABOUT YOURSELF - OR THAT YOU ARE A FAILURE OR HAVE LET YOURSELF OR YOUR FAMILY DOWN: NOT AT ALL
7. TROUBLE CONCENTRATING ON THINGS, SUCH AS READING THE NEWSPAPER OR WATCHING TELEVISION: NOT AT ALL
SUM OF ALL RESPONSES TO PHQ QUESTIONS 1-9: 0
4. FEELING TIRED OR HAVING LITTLE ENERGY: NOT AT ALL
SUM OF ALL RESPONSES TO PHQ QUESTIONS 1-9: 0
1. LITTLE INTEREST OR PLEASURE IN DOING THINGS: NOT AT ALL
SUM OF ALL RESPONSES TO PHQ9 QUESTIONS 1 & 2: 0
5. POOR APPETITE OR OVEREATING: NOT AT ALL

## 2025-02-03 NOTE — PROGRESS NOTES
[facility]      Patient:  Tam Barr 49 y.o. male     Date of Service: 02/03/25        Chief Complaint:   Chief Complaint   Patient presents with    Medication Check     Needs medication refills         History of Present Illness     Hypertension stable at this time and controlled.  Otherwise asymptomatic with no CP/SOB.  Tolerating current medication regimen well.    History of type 2 diabetes, stable this time and no hyper/hypoglycemic symptoms.    Allergies:    Patient has no known allergies.    Medication List:    Current Outpatient Medications   Medication Sig Dispense Refill    carvedilol (COREG) 12.5 MG tablet Take 1 tablet by mouth 2 times daily 180 tablet 3    valsartan-hydroCHLOROthiazide (DIOVAN-HCT) 160-12.5 MG per tablet Take 1 tablet by mouth daily 90 tablet 3    glipiZIDE (GLUCOTROL) 5 MG tablet Take 1 tablet by mouth 2 times daily 180 tablet 1    lamoTRIgine (LAMICTAL) 200 MG tablet Take 1 tablet by mouth daily 90 tablet 1    lurasidone (LATUDA) 20 MG TABS tablet Take 1 tablet by mouth Daily with supper 90 tablet 1    rosuvastatin (CRESTOR) 40 MG tablet Take 1 tablet by mouth daily 90 tablet 3    clopidogrel (PLAVIX) 75 MG tablet Take 1 tablet by mouth daily 90 tablet 3    aspirin 81 MG EC tablet Take 1 tablet by mouth daily 90 tablet 3    blood glucose monitor kit and supplies Dispense sufficient amount for indicated testing frequency plus additional to accommodate PRN testing needs. Dispense all needed supplies to include: monitor, strips, lancing device, lancets, control solutions, alcohol swabs. 1 kit 0     No current facility-administered medications for this visit.          Review of Systems   See hpi  Physical Exam   Physical Exam  Constitutional:       Appearance: Well-developed.   HENT:      Head: Normocephalic.      Right Ear: External ear normal.      Left Ear: External ear normal  Cardiovascular:      Rate and Rhythm: Normal rate and regular rhythm.

## 2025-02-05 ENCOUNTER — TELEPHONE (OUTPATIENT)
Dept: CARDIOLOGY | Age: 50
End: 2025-02-05

## 2025-02-05 NOTE — TELEPHONE ENCOUNTER
----- Message from Sofia Mar PA-C sent at 2/5/2025  8:06 AM EST -----  Please let them know that their CAM monitor was overall unremarkable. We will discuss at their follow up appointment.

## 2025-02-12 DIAGNOSIS — F31.81 BIPOLAR 2 DISORDER (HCC): ICD-10-CM

## 2025-02-12 RX ORDER — LAMOTRIGINE 200 MG/1
200 TABLET ORAL DAILY
Qty: 90 TABLET | Refills: 1 | Status: SHIPPED | OUTPATIENT
Start: 2025-02-12 | End: 2025-08-11

## 2025-08-25 DIAGNOSIS — E11.9 TYPE 2 DIABETES MELLITUS WITHOUT COMPLICATION, WITHOUT LONG-TERM CURRENT USE OF INSULIN (HCC): Primary | ICD-10-CM

## 2025-08-26 RX ORDER — GLIPIZIDE 5 MG/1
5 TABLET ORAL 2 TIMES DAILY
Qty: 120 TABLET | Refills: 0 | Status: SHIPPED | OUTPATIENT
Start: 2025-08-26 | End: 2025-10-25

## (undated) DEVICE — CARDIAC CATH LAB PACK LF

## (undated) DEVICE — TIBURON NEONATAL DRAPE: Brand: CONVERTORS

## (undated) DEVICE — RUNTHROUGH NS EXTRA FLOPPY PTCA GUIDEWIRE: Brand: RUNTHROUGH

## (undated) DEVICE — OFF - ST. RITAS VASC: Brand: MEDLINE INDUSTRIES, INC.

## (undated) DEVICE — GLOVE ORANGE PI 7 1/2   MSG9075

## (undated) DEVICE — DRAPE, RADIAL, STERILE: Brand: MEDLINE

## (undated) DEVICE — GLIDESHEATH NITINOL HYDROPHILIC COATED INTRODUCER SHEATH: Brand: GLIDESHEATH

## (undated) DEVICE — RADIFOCUS OPTITORQUE ANGIOGRAPHIC CATHETER: Brand: OPTITORQUE

## (undated) DEVICE — BENTSON WIRE GUIDE 20CM DISTAL FLEXIBILITY WITH SOFTENED TIP: Brand: BENTSON

## (undated) DEVICE — GLIDESHEATH SLENDER ACCESS KIT: Brand: GLIDESHEATH SLENDER

## (undated) DEVICE — DEVICE INFL 20ML 30ATM POLYCARB BRL ABS PLUNG DGT DISPLAY

## (undated) DEVICE — GUIDEWIRE VASC L260CM DIA0.035IN RAD 3MM J TIP L7CM PTFE

## (undated) DEVICE — CATHETER GUID 6FR DIA0.071IN SHFT NYL STD L JR 4 CRV ENH

## (undated) DEVICE — CATHETER ANGIO 6FR L110CM ID0.056IN VENT PIG CRV ROBUST

## (undated) DEVICE — DRAPE KIT RAMAPR RADIATION SHIELD

## (undated) DEVICE — MERCY TIFFIN CATH LAB PACK: Brand: MEDLINE INDUSTRIES, INC.

## (undated) DEVICE — GUIDE EXTENSION CATHETER: Brand: GUIDEZILLA™ II

## (undated) DEVICE — VALVE HEMSTAS W/ GWIRE INSRTN TOOL GRDIAN II NC